# Patient Record
Sex: MALE | Race: OTHER
[De-identification: names, ages, dates, MRNs, and addresses within clinical notes are randomized per-mention and may not be internally consistent; named-entity substitution may affect disease eponyms.]

---

## 2018-02-13 LAB
ALBUMIN SERPL BCP-MCNC: 3.8 G/DL (ref 3.4–5)
ANION GAP SERPL CALCULATED.3IONS-SCNC: 12 MMOL/L (ref 8–16)
APTT PPP: 30 SECONDS (ref 22–32)
BASOPHILS # BLD AUTO: 0 X10'3 (ref 0–0.2)
BASOPHILS NFR BLD AUTO: 0.4 % (ref 0–1)
BUN SERPL-MCNC: 25 MG/DL (ref 7–18)
BUN/CREAT SERPL: 20.7 (ref 5.4–32)
CALCIUM SERPL-MCNC: 8.5 MG/DL (ref 8.5–10.1)
CHLORIDE SERPL-SCNC: 103 MMOL/L (ref 99–107)
CO2 SERPL-SCNC: 22.5 MMOL/L (ref 24–32)
CREAT SERPL-MCNC: 1.21 MG/DL (ref 0.6–1.1)
EOSINOPHIL # BLD AUTO: 0.3 X10'3 (ref 0–0.9)
EOSINOPHIL NFR BLD AUTO: 3.6 % (ref 0–6)
ERYTHROCYTE [DISTWIDTH] IN BLOOD BY AUTOMATED COUNT: 14.8 % (ref 11.5–14.5)
GFR SERPL CREATININE-BSD FRML MDRD: 59 ML/MIN
GLUCOSE SERPL-MCNC: 204 MG/DL (ref 70–104)
HCT VFR BLD AUTO: 39.3 % (ref 42–52)
HGB BLD-MCNC: 13.4 G/DL (ref 14–17.9)
INR PPP: 1 INR
LYMPHOCYTES # BLD AUTO: 2.3 X10'3 (ref 1.1–4.8)
LYMPHOCYTES NFR BLD AUTO: 30.9 % (ref 21–51)
MCH RBC QN AUTO: 32.3 PG (ref 27–31)
MCHC RBC AUTO-ENTMCNC: 34.2 % (ref 33–36.5)
MCV RBC AUTO: 94.6 FL (ref 78–98)
MONOCYTES # BLD AUTO: 0.5 X10'3 (ref 0–0.9)
MONOCYTES NFR BLD AUTO: 6.8 % (ref 2–12)
NEUTROPHILS # BLD AUTO: 4.4 X10'3 (ref 1.8–7.7)
NEUTROPHILS NFR BLD AUTO: 58.3 % (ref 42–75)
PLATELET # BLD AUTO: 220 X10'3 (ref 140–440)
PMV BLD AUTO: 7.6 FL (ref 7.4–10.4)
POTASSIUM SERPL-SCNC: 4.4 MMOL/L (ref 3.5–5.1)
PROTHROMBIN TIME: 10.5 SECONDS (ref 9–12)
RBC # BLD AUTO: 4.15 X10'6 (ref 4.7–6.1)
SODIUM SERPL-SCNC: 137 MMOL/L (ref 135–145)
WBC # BLD AUTO: 7.5 X10'3 (ref 4.5–11)

## 2018-02-14 ENCOUNTER — HOSPITAL ENCOUNTER (INPATIENT)
Dept: HOSPITAL 94 - SSTAY O | Age: 73
LOS: 2 days | Discharge: HOME | DRG: 247 | End: 2018-02-16
Attending: INTERNAL MEDICINE | Admitting: INTERNAL MEDICINE
Payer: MEDICARE

## 2018-02-14 VITALS — SYSTOLIC BLOOD PRESSURE: 200 MMHG | DIASTOLIC BLOOD PRESSURE: 84 MMHG

## 2018-02-14 VITALS — WEIGHT: 209.44 LBS | HEIGHT: 68 IN | BODY MASS INDEX: 31.74 KG/M2

## 2018-02-14 VITALS — DIASTOLIC BLOOD PRESSURE: 68 MMHG | SYSTOLIC BLOOD PRESSURE: 114 MMHG

## 2018-02-14 VITALS — SYSTOLIC BLOOD PRESSURE: 119 MMHG | DIASTOLIC BLOOD PRESSURE: 59 MMHG

## 2018-02-14 VITALS — SYSTOLIC BLOOD PRESSURE: 142 MMHG | DIASTOLIC BLOOD PRESSURE: 68 MMHG

## 2018-02-14 VITALS — DIASTOLIC BLOOD PRESSURE: 79 MMHG | SYSTOLIC BLOOD PRESSURE: 165 MMHG

## 2018-02-14 VITALS — SYSTOLIC BLOOD PRESSURE: 184 MMHG | DIASTOLIC BLOOD PRESSURE: 80 MMHG

## 2018-02-14 VITALS — DIASTOLIC BLOOD PRESSURE: 94 MMHG | SYSTOLIC BLOOD PRESSURE: 189 MMHG

## 2018-02-14 VITALS — SYSTOLIC BLOOD PRESSURE: 112 MMHG | DIASTOLIC BLOOD PRESSURE: 58 MMHG

## 2018-02-14 VITALS — SYSTOLIC BLOOD PRESSURE: 152 MMHG | DIASTOLIC BLOOD PRESSURE: 79 MMHG

## 2018-02-14 VITALS — SYSTOLIC BLOOD PRESSURE: 175 MMHG | DIASTOLIC BLOOD PRESSURE: 86 MMHG

## 2018-02-14 VITALS — DIASTOLIC BLOOD PRESSURE: 70 MMHG | SYSTOLIC BLOOD PRESSURE: 136 MMHG

## 2018-02-14 VITALS — DIASTOLIC BLOOD PRESSURE: 60 MMHG | SYSTOLIC BLOOD PRESSURE: 112 MMHG

## 2018-02-14 DIAGNOSIS — E11.22: ICD-10-CM

## 2018-02-14 DIAGNOSIS — I12.9: ICD-10-CM

## 2018-02-14 DIAGNOSIS — K21.9: ICD-10-CM

## 2018-02-14 DIAGNOSIS — Z89.429: ICD-10-CM

## 2018-02-14 DIAGNOSIS — R94.39: ICD-10-CM

## 2018-02-14 DIAGNOSIS — E78.5: ICD-10-CM

## 2018-02-14 DIAGNOSIS — Z79.899: ICD-10-CM

## 2018-02-14 DIAGNOSIS — Z60.2: ICD-10-CM

## 2018-02-14 DIAGNOSIS — I65.29: ICD-10-CM

## 2018-02-14 DIAGNOSIS — N18.9: ICD-10-CM

## 2018-02-14 DIAGNOSIS — I25.10: Primary | ICD-10-CM

## 2018-02-14 DIAGNOSIS — E55.9: ICD-10-CM

## 2018-02-14 DIAGNOSIS — I45.10: ICD-10-CM

## 2018-02-14 LAB
APTT PPP: 144 SECONDS (ref 22–32)
INR PPP: 1.1 INR
PROTHROMBIN TIME: 11.8 SECONDS (ref 9–12)

## 2018-02-14 PROCEDURE — 4A023N7 MEASUREMENT OF CARDIAC SAMPLING AND PRESSURE, LEFT HEART, PERCUTANEOUS APPROACH: ICD-10-PCS | Performed by: INTERNAL MEDICINE

## 2018-02-14 PROCEDURE — 93005 ELECTROCARDIOGRAM TRACING: CPT

## 2018-02-14 PROCEDURE — 85610 PROTHROMBIN TIME: CPT

## 2018-02-14 PROCEDURE — 85025 COMPLETE CBC W/AUTO DIFF WBC: CPT

## 2018-02-14 PROCEDURE — 85347 COAGULATION TIME ACTIVATED: CPT

## 2018-02-14 PROCEDURE — 99152 MOD SED SAME PHYS/QHP 5/>YRS: CPT

## 2018-02-14 PROCEDURE — 36415 COLL VENOUS BLD VENIPUNCTURE: CPT

## 2018-02-14 PROCEDURE — 80061 LIPID PANEL: CPT

## 2018-02-14 PROCEDURE — 80048 BASIC METABOLIC PNL TOTAL CA: CPT

## 2018-02-14 PROCEDURE — 027135Z DILATION OF CORONARY ARTERY, TWO ARTERIES WITH TWO DRUG-ELUTING INTRALUMINAL DEVICES, PERCUTANEOUS APPROACH: ICD-10-PCS | Performed by: INTERNAL MEDICINE

## 2018-02-14 PROCEDURE — 99153 MOD SED SAME PHYS/QHP EA: CPT

## 2018-02-14 PROCEDURE — B2111ZZ FLUOROSCOPY OF MULTIPLE CORONARY ARTERIES USING LOW OSMOLAR CONTRAST: ICD-10-PCS | Performed by: INTERNAL MEDICINE

## 2018-02-14 PROCEDURE — 93458 L HRT ARTERY/VENTRICLE ANGIO: CPT

## 2018-02-14 PROCEDURE — 85730 THROMBOPLASTIN TIME PARTIAL: CPT

## 2018-02-14 PROCEDURE — B2151ZZ FLUOROSCOPY OF LEFT HEART USING LOW OSMOLAR CONTRAST: ICD-10-PCS | Performed by: INTERNAL MEDICINE

## 2018-02-14 RX ADMIN — SODIUM CHLORIDE SCH MLS/HR: 9 INJECTION INTRAMUSCULAR; INTRAVENOUS; SUBCUTANEOUS at 14:24

## 2018-02-14 RX ADMIN — Medication SCH MLS/HR: at 23:13

## 2018-02-14 RX ADMIN — DOCUSATE SODIUM SCH MG: 100 CAPSULE, LIQUID FILLED ORAL at 20:29

## 2018-02-14 RX ADMIN — SODIUM CHLORIDE SCH MLS/HR: 9 INJECTION INTRAMUSCULAR; INTRAVENOUS; SUBCUTANEOUS at 11:06

## 2018-02-14 SDOH — SOCIAL STABILITY - SOCIAL INSECURITY: PROBLEMS RELATED TO LIVING ALONE: Z60.2

## 2018-02-15 VITALS — DIASTOLIC BLOOD PRESSURE: 80 MMHG | SYSTOLIC BLOOD PRESSURE: 161 MMHG

## 2018-02-15 VITALS — SYSTOLIC BLOOD PRESSURE: 155 MMHG | DIASTOLIC BLOOD PRESSURE: 85 MMHG

## 2018-02-15 VITALS — SYSTOLIC BLOOD PRESSURE: 165 MMHG | DIASTOLIC BLOOD PRESSURE: 84 MMHG

## 2018-02-15 VITALS — DIASTOLIC BLOOD PRESSURE: 58 MMHG | SYSTOLIC BLOOD PRESSURE: 153 MMHG

## 2018-02-15 VITALS — SYSTOLIC BLOOD PRESSURE: 165 MMHG | DIASTOLIC BLOOD PRESSURE: 88 MMHG

## 2018-02-15 VITALS — SYSTOLIC BLOOD PRESSURE: 157 MMHG | DIASTOLIC BLOOD PRESSURE: 54 MMHG

## 2018-02-15 VITALS — SYSTOLIC BLOOD PRESSURE: 163 MMHG | DIASTOLIC BLOOD PRESSURE: 87 MMHG

## 2018-02-15 VITALS — DIASTOLIC BLOOD PRESSURE: 58 MMHG | SYSTOLIC BLOOD PRESSURE: 102 MMHG

## 2018-02-15 VITALS — SYSTOLIC BLOOD PRESSURE: 143 MMHG | DIASTOLIC BLOOD PRESSURE: 95 MMHG

## 2018-02-15 VITALS — DIASTOLIC BLOOD PRESSURE: 59 MMHG | SYSTOLIC BLOOD PRESSURE: 92 MMHG

## 2018-02-15 VITALS — SYSTOLIC BLOOD PRESSURE: 145 MMHG | DIASTOLIC BLOOD PRESSURE: 75 MMHG

## 2018-02-15 VITALS — DIASTOLIC BLOOD PRESSURE: 85 MMHG | SYSTOLIC BLOOD PRESSURE: 178 MMHG

## 2018-02-15 VITALS — SYSTOLIC BLOOD PRESSURE: 176 MMHG | DIASTOLIC BLOOD PRESSURE: 89 MMHG

## 2018-02-15 VITALS — DIASTOLIC BLOOD PRESSURE: 59 MMHG | SYSTOLIC BLOOD PRESSURE: 119 MMHG

## 2018-02-15 VITALS — SYSTOLIC BLOOD PRESSURE: 160 MMHG | DIASTOLIC BLOOD PRESSURE: 78 MMHG

## 2018-02-15 VITALS — DIASTOLIC BLOOD PRESSURE: 82 MMHG | SYSTOLIC BLOOD PRESSURE: 154 MMHG

## 2018-02-15 VITALS — SYSTOLIC BLOOD PRESSURE: 168 MMHG | DIASTOLIC BLOOD PRESSURE: 79 MMHG

## 2018-02-15 VITALS — SYSTOLIC BLOOD PRESSURE: 157 MMHG | DIASTOLIC BLOOD PRESSURE: 74 MMHG

## 2018-02-15 VITALS — DIASTOLIC BLOOD PRESSURE: 92 MMHG | SYSTOLIC BLOOD PRESSURE: 167 MMHG

## 2018-02-15 VITALS — SYSTOLIC BLOOD PRESSURE: 165 MMHG | DIASTOLIC BLOOD PRESSURE: 79 MMHG

## 2018-02-15 VITALS — SYSTOLIC BLOOD PRESSURE: 176 MMHG | DIASTOLIC BLOOD PRESSURE: 88 MMHG

## 2018-02-15 VITALS — SYSTOLIC BLOOD PRESSURE: 121 MMHG | DIASTOLIC BLOOD PRESSURE: 61 MMHG

## 2018-02-15 LAB
ALBUMIN SERPL BCP-MCNC: 3 G/DL (ref 3.4–5)
ANION GAP SERPL CALCULATED.3IONS-SCNC: 11 MMOL/L (ref 8–16)
BASOPHILS # BLD AUTO: 0 X10'3 (ref 0–0.2)
BASOPHILS NFR BLD AUTO: 0.5 % (ref 0–1)
BUN SERPL-MCNC: 19 MG/DL (ref 7–18)
BUN/CREAT SERPL: 15.8 (ref 5.4–32)
CALCIUM SERPL-MCNC: 8 MG/DL (ref 8.5–10.1)
CHLORIDE SERPL-SCNC: 100 MMOL/L (ref 99–107)
CHOLEST SERPL-MCNC: 101 MG/DL (ref 0–200)
CHOLEST/HDLC SERPL: 1.8 {RATIO} (ref 0–4.99)
CO2 SERPL-SCNC: 27.6 MMOL/L (ref 24–32)
CREAT SERPL-MCNC: 1.2 MG/DL (ref 0.6–1.1)
EOSINOPHIL # BLD AUTO: 0.3 X10'3 (ref 0–0.9)
EOSINOPHIL NFR BLD AUTO: 4.1 % (ref 0–6)
ERYTHROCYTE [DISTWIDTH] IN BLOOD BY AUTOMATED COUNT: 14.6 % (ref 11.5–14.5)
GFR SERPL CREATININE-BSD FRML MDRD: 60 ML/MIN
GLUCOSE SERPL-MCNC: 250 MG/DL (ref 70–104)
HCT VFR BLD AUTO: 33.3 % (ref 42–52)
HDLC SERPL-MCNC: 55 MG/DL (ref 35–60)
HGB BLD-MCNC: 11.4 G/DL (ref 14–17.9)
LDLC SERPL DIRECT ASSAY-MCNC: 48 MG/DL (ref 50–100)
LYMPHOCYTES # BLD AUTO: 1.8 X10'3 (ref 1.1–4.8)
LYMPHOCYTES NFR BLD AUTO: 27.8 % (ref 21–51)
MCH RBC QN AUTO: 32.2 PG (ref 27–31)
MCHC RBC AUTO-ENTMCNC: 34.1 % (ref 33–36.5)
MCV RBC AUTO: 94.5 FL (ref 78–98)
MONOCYTES # BLD AUTO: 0.7 X10'3 (ref 0–0.9)
MONOCYTES NFR BLD AUTO: 11.1 % (ref 2–12)
NEUTROPHILS # BLD AUTO: 3.6 X10'3 (ref 1.8–7.7)
NEUTROPHILS NFR BLD AUTO: 56.5 % (ref 42–75)
PLATELET # BLD AUTO: 197 X10'3 (ref 140–440)
PMV BLD AUTO: 7.6 FL (ref 7.4–10.4)
POTASSIUM SERPL-SCNC: 3.6 MMOL/L (ref 3.5–5.1)
RBC # BLD AUTO: 3.52 X10'6 (ref 4.7–6.1)
SODIUM SERPL-SCNC: 139 MMOL/L (ref 135–145)
TRIGL SERPL-MCNC: 79 MG/DL (ref 20–135)
WBC # BLD AUTO: 6.4 X10'3 (ref 4.5–11)

## 2018-02-15 PROCEDURE — 027034Z DILATION OF CORONARY ARTERY, ONE ARTERY WITH DRUG-ELUTING INTRALUMINAL DEVICE, PERCUTANEOUS APPROACH: ICD-10-PCS | Performed by: INTERNAL MEDICINE

## 2018-02-15 RX ADMIN — DOCUSATE SODIUM SCH MG: 100 CAPSULE, LIQUID FILLED ORAL at 10:11

## 2018-02-15 RX ADMIN — DOCUSATE SODIUM SCH MG: 100 CAPSULE, LIQUID FILLED ORAL at 20:22

## 2018-02-15 RX ADMIN — Medication SCH MLS/HR: at 10:40

## 2018-02-16 VITALS — SYSTOLIC BLOOD PRESSURE: 122 MMHG | DIASTOLIC BLOOD PRESSURE: 52 MMHG

## 2018-02-16 VITALS — DIASTOLIC BLOOD PRESSURE: 70 MMHG | SYSTOLIC BLOOD PRESSURE: 123 MMHG

## 2018-02-16 VITALS — DIASTOLIC BLOOD PRESSURE: 64 MMHG | SYSTOLIC BLOOD PRESSURE: 135 MMHG

## 2018-02-16 VITALS — DIASTOLIC BLOOD PRESSURE: 73 MMHG | SYSTOLIC BLOOD PRESSURE: 141 MMHG

## 2018-02-16 VITALS — SYSTOLIC BLOOD PRESSURE: 140 MMHG | DIASTOLIC BLOOD PRESSURE: 79 MMHG

## 2018-02-16 VITALS — SYSTOLIC BLOOD PRESSURE: 100 MMHG | DIASTOLIC BLOOD PRESSURE: 59 MMHG

## 2018-02-16 VITALS — SYSTOLIC BLOOD PRESSURE: 119 MMHG | DIASTOLIC BLOOD PRESSURE: 76 MMHG

## 2018-02-16 VITALS — DIASTOLIC BLOOD PRESSURE: 78 MMHG | SYSTOLIC BLOOD PRESSURE: 158 MMHG

## 2018-02-16 VITALS — SYSTOLIC BLOOD PRESSURE: 139 MMHG | DIASTOLIC BLOOD PRESSURE: 74 MMHG

## 2018-02-16 VITALS — SYSTOLIC BLOOD PRESSURE: 156 MMHG | DIASTOLIC BLOOD PRESSURE: 76 MMHG

## 2018-02-16 VITALS — DIASTOLIC BLOOD PRESSURE: 80 MMHG | SYSTOLIC BLOOD PRESSURE: 155 MMHG

## 2018-02-16 VITALS — SYSTOLIC BLOOD PRESSURE: 75 MMHG | DIASTOLIC BLOOD PRESSURE: 43 MMHG

## 2018-02-16 RX ADMIN — DOCUSATE SODIUM SCH MG: 100 CAPSULE, LIQUID FILLED ORAL at 08:00

## 2019-12-02 ENCOUNTER — HOSPITAL ENCOUNTER (INPATIENT)
Dept: HOSPITAL 94 - ER | Age: 74
LOS: 16 days | Discharge: NURSING, REHAB OR LONG-TERM CARE | DRG: 270 | End: 2019-12-18
Attending: INTERNAL MEDICINE | Admitting: HOSPITALIST
Payer: MEDICARE

## 2019-12-02 VITALS — SYSTOLIC BLOOD PRESSURE: 90 MMHG | DIASTOLIC BLOOD PRESSURE: 46 MMHG

## 2019-12-02 VITALS — DIASTOLIC BLOOD PRESSURE: 76 MMHG | SYSTOLIC BLOOD PRESSURE: 97 MMHG

## 2019-12-02 VITALS — WEIGHT: 239.64 LBS | BODY MASS INDEX: 36.32 KG/M2 | HEIGHT: 68 IN

## 2019-12-02 VITALS — DIASTOLIC BLOOD PRESSURE: 86 MMHG | SYSTOLIC BLOOD PRESSURE: 102 MMHG

## 2019-12-02 VITALS — SYSTOLIC BLOOD PRESSURE: 94 MMHG | DIASTOLIC BLOOD PRESSURE: 43 MMHG

## 2019-12-02 VITALS — DIASTOLIC BLOOD PRESSURE: 46 MMHG | SYSTOLIC BLOOD PRESSURE: 92 MMHG

## 2019-12-02 DIAGNOSIS — I25.10: ICD-10-CM

## 2019-12-02 DIAGNOSIS — Z79.82: ICD-10-CM

## 2019-12-02 DIAGNOSIS — Z99.2: ICD-10-CM

## 2019-12-02 DIAGNOSIS — Z79.899: ICD-10-CM

## 2019-12-02 DIAGNOSIS — Z79.84: ICD-10-CM

## 2019-12-02 DIAGNOSIS — I21.3: Primary | ICD-10-CM

## 2019-12-02 DIAGNOSIS — Z88.5: ICD-10-CM

## 2019-12-02 DIAGNOSIS — I42.0: ICD-10-CM
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
## 2019-12-02 DIAGNOSIS — I46.9: ICD-10-CM

## 2019-12-02 DIAGNOSIS — I25.5: ICD-10-CM

## 2019-12-02 DIAGNOSIS — Z63.8: ICD-10-CM

## 2019-12-02 DIAGNOSIS — I13.0: ICD-10-CM

## 2019-12-02 DIAGNOSIS — E11.22: ICD-10-CM

## 2019-12-02 DIAGNOSIS — E78.5: ICD-10-CM
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
## 2019-12-02 DIAGNOSIS — R94.5: ICD-10-CM

## 2019-12-02 DIAGNOSIS — E66.9: ICD-10-CM
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
## 2019-12-02 DIAGNOSIS — K21.9: ICD-10-CM

## 2019-12-02 DIAGNOSIS — N17.0: ICD-10-CM

## 2019-12-02 DIAGNOSIS — I50.9: ICD-10-CM

## 2019-12-02 DIAGNOSIS — Z66: ICD-10-CM

## 2019-12-02 DIAGNOSIS — I97.710: ICD-10-CM

## 2019-12-02 DIAGNOSIS — Z79.02: ICD-10-CM

## 2019-12-02 DIAGNOSIS — Z95.5: ICD-10-CM

## 2019-12-02 DIAGNOSIS — M19.90: ICD-10-CM

## 2019-12-02 DIAGNOSIS — J96.00: ICD-10-CM

## 2019-12-02 DIAGNOSIS — Z82.3: ICD-10-CM

## 2019-12-02 DIAGNOSIS — D64.9: ICD-10-CM

## 2019-12-02 DIAGNOSIS — J98.11: ICD-10-CM

## 2019-12-02 DIAGNOSIS — N18.3: ICD-10-CM

## 2019-12-02 LAB
ALBUMIN SERPL BCP-MCNC: 2.7 G/DL (ref 3.4–5)
ALBUMIN SERPL BCP-MCNC: 3.2 G/DL (ref 3.4–5)
ALBUMIN/GLOB SERPL: 0.7 {RATIO} (ref 1.1–1.5)
ALBUMIN/GLOB SERPL: 0.8 {RATIO} (ref 1.1–1.5)
ALP SERPL-CCNC: 112 IU/L (ref 46–116)
ALP SERPL-CCNC: 118 IU/L (ref 46–116)
ALT SERPL W P-5'-P-CCNC: 22 U/L (ref 12–78)
ALT SERPL W P-5'-P-CCNC: 32 U/L (ref 12–78)
ANION GAP SERPL CALCULATED.3IONS-SCNC: 10 MMOL/L (ref 8–16)
ANION GAP SERPL CALCULATED.3IONS-SCNC: 13 MMOL/L (ref 8–16)
APTT PPP: (no result) SECONDS (ref 22–32)
APTT PPP: 65 SECONDS (ref 22–32)
AST SERPL W P-5'-P-CCNC: 35 U/L (ref 10–37)
AST SERPL W P-5'-P-CCNC: 53 U/L (ref 10–37)
BASE EXCESS BLDA CALC-SCNC: -10.3 MMOL/L (ref -2–3)
BASOPHILS # BLD AUTO: 0.1 X10'3 (ref 0–0.2)
BASOPHILS # BLD AUTO: 0.1 X10'3 (ref 0–0.2)
BASOPHILS NFR BLD AUTO: 0.5 % (ref 0–1)
BASOPHILS NFR BLD AUTO: 0.6 % (ref 0–1)
BILIRUB SERPL-MCNC: 0.9 MG/DL (ref 0.1–1)
BILIRUB SERPL-MCNC: 1.2 MG/DL (ref 0.1–1)
BODY TEMPERATURE: 36.6
BUN SERPL-MCNC: 31 MG/DL (ref 7–18)
BUN SERPL-MCNC: 33 MG/DL (ref 7–18)
BUN/CREAT SERPL: 17.6 (ref 5.4–32)
BUN/CREAT SERPL: 18.3 (ref 5.4–32)
CALCIUM SERPL-MCNC: 7.6 MG/DL (ref 8.5–10.1)
CALCIUM SERPL-MCNC: 8.3 MG/DL (ref 8.5–10.1)
CHLORIDE SERPL-SCNC: 98 MMOL/L (ref 99–107)
CHLORIDE SERPL-SCNC: 99 MMOL/L (ref 99–107)
CO2 SERPL-SCNC: 20.3 MMOL/L (ref 24–32)
CO2 SERPL-SCNC: 22.9 MMOL/L (ref 24–32)
COHGB MFR BLDA: 0.3 % (ref 0.5–1.5)
CREAT SERPL-MCNC: 1.69 MG/DL (ref 0.6–1.1)
CREAT SERPL-MCNC: 1.88 MG/DL (ref 0.6–1.1)
EOSINOPHIL # BLD AUTO: 0.1 X10'3 (ref 0–0.9)
EOSINOPHIL # BLD AUTO: 0.2 X10'3 (ref 0–0.9)
EOSINOPHIL NFR BLD AUTO: 0.3 % (ref 0–6)
EOSINOPHIL NFR BLD AUTO: 2.6 % (ref 0–6)
ERYTHROCYTE [DISTWIDTH] IN BLOOD BY AUTOMATED COUNT: 13.5 % (ref 11.5–14.5)
ERYTHROCYTE [DISTWIDTH] IN BLOOD BY AUTOMATED COUNT: 13.6 % (ref 11.5–14.5)
GFR SERPL CREATININE-BSD FRML MDRD: 35 ML/MIN
GFR SERPL CREATININE-BSD FRML MDRD: 40 ML/MIN
GLUCOSE SERPL-MCNC: 149 MG/DL (ref 70–104)
GLUCOSE SERPL-MCNC: 317 MG/DL (ref 70–104)
HBA1C MFR BLD: 11.6 % (ref 4.5–6.2)
HCO3 BLDA-SCNC: 16.8 MMOL/L (ref 22–26)
HCT VFR BLD AUTO: 33.1 % (ref 42–52)
HCT VFR BLD AUTO: 34.4 % (ref 42–52)
HGB BLD-MCNC: 11.2 G/DL (ref 14–17.9)
HGB BLD-MCNC: 11.6 G/DL (ref 14–17.9)
HGB BLDA-MCNC: 12.8 G/DL (ref 14–17.9)
LYMPHOCYTES # BLD AUTO: 1.2 X10'3 (ref 1.1–4.8)
LYMPHOCYTES # BLD AUTO: 1.6 X10'3 (ref 1.1–4.8)
LYMPHOCYTES NFR BLD AUTO: 17.4 % (ref 21–51)
LYMPHOCYTES NFR BLD AUTO: 8 % (ref 21–51)
MAGNESIUM SERPL-MCNC: 1.6 MG/DL (ref 1.5–2.4)
MAGNESIUM SERPL-MCNC: 1.7 MG/DL (ref 1.5–2.4)
MCH RBC QN AUTO: 31.3 PG (ref 27–31)
MCH RBC QN AUTO: 31.3 PG (ref 27–31)
MCHC RBC AUTO-ENTMCNC: 33.8 G/DL (ref 33–36.5)
MCHC RBC AUTO-ENTMCNC: 33.9 G/DL (ref 33–36.5)
MCV RBC AUTO: 92.2 FL (ref 78–98)
MCV RBC AUTO: 92.4 FL (ref 78–98)
METHGB MFR BLDA: 0.3 % (ref 0.3–1.12)
MONOCYTES # BLD AUTO: 1.1 X10'3 (ref 0–0.9)
MONOCYTES # BLD AUTO: 1.4 X10'3 (ref 0–0.9)
MONOCYTES NFR BLD AUTO: 12.2 % (ref 2–12)
MONOCYTES NFR BLD AUTO: 9.2 % (ref 2–12)
NEUTROPHILS # BLD AUTO: 12.1 X10'3 (ref 1.8–7.7)
NEUTROPHILS # BLD AUTO: 6.3 X10'3 (ref 1.8–7.7)
NEUTROPHILS NFR BLD AUTO: 67.3 % (ref 42–75)
NEUTROPHILS NFR BLD AUTO: 81.9 % (ref 42–75)
O2/TOTAL GAS SETTING VFR VENT: 100 MMHG/%
OXYHGB MFR BLDA: 94 % (ref 94–100)
PCO2 TEMP ADJ BLDA: 41 MMHG (ref 35–45)
PEEP RESPIRATORY: 5 CM H2O
PH TEMP ADJ BLDA: 7.23 [PH] (ref 7.35–7.45)
PHOSPHATE SERPL-MCNC: 4.4 MG/DL (ref 2.3–4.5)
PLATELET # BLD AUTO: 249 X10'3 (ref 140–440)
PLATELET # BLD AUTO: 252 X10'3 (ref 140–440)
PMV BLD AUTO: 7.5 FL (ref 7.4–10.4)
PMV BLD AUTO: 7.8 FL (ref 7.4–10.4)
PO2 TEMP ADJ BLD: 82.1 MMHG (ref 83–108)
POTASSIUM SERPL-SCNC: 3.6 MMOL/L (ref 3.5–5.1)
POTASSIUM SERPL-SCNC: 4.2 MMOL/L (ref 3.5–5.1)
PROT SERPL-MCNC: 6.6 G/DL (ref 6.4–8.2)
PROT SERPL-MCNC: 7.4 G/DL (ref 6.4–8.2)
RBC # BLD AUTO: 3.59 X10'6 (ref 4.7–6.1)
RBC # BLD AUTO: 3.72 X10'6 (ref 4.7–6.1)
RESP RATE 1H: 16 B/MIN
RESP RATE RESTING: 16 B/MIN
SAO2 % BLDA: 94.6 % (ref 95–98)
SODIUM SERPL-SCNC: 131 MMOL/L (ref 135–145)
SODIUM SERPL-SCNC: 132 MMOL/L (ref 135–145)
SPONT VT COMPRES GAS VOL SET UNCOR VENT: 450 ML
TROPONIN I SERPL-MCNC: 3.17 NG/ML (ref 0–0.05)
VOL.EXP/M/BW ON VENT: 8 L/MIN
WBC # BLD AUTO: 14.7 X10'3 (ref 4.5–11)
WBC # BLD AUTO: 9.4 X10'3 (ref 4.5–11)

## 2019-12-02 PROCEDURE — 93306 TTE W/DOPPLER COMPLETE: CPT

## 2019-12-02 PROCEDURE — 86900 BLOOD TYPING SEROLOGIC ABO: CPT

## 2019-12-02 PROCEDURE — 84478 ASSAY OF TRIGLYCERIDES: CPT

## 2019-12-02 PROCEDURE — 90732 PPSV23 VACC 2 YRS+ SUBQ/IM: CPT

## 2019-12-02 PROCEDURE — 36558 INSERT TUNNELED CV CATH: CPT

## 2019-12-02 PROCEDURE — 0BH17EZ INSERTION OF ENDOTRACHEAL AIRWAY INTO TRACHEA, VIA NATURAL OR ARTIFICIAL OPENING: ICD-10-PCS | Performed by: EMERGENCY MEDICINE

## 2019-12-02 PROCEDURE — 80048 BASIC METABOLIC PNL TOTAL CA: CPT

## 2019-12-02 PROCEDURE — 94002 VENT MGMT INPAT INIT DAY: CPT

## 2019-12-02 PROCEDURE — 87088 URINE BACTERIA CULTURE: CPT

## 2019-12-02 PROCEDURE — 94003 VENT MGMT INPAT SUBQ DAY: CPT

## 2019-12-02 PROCEDURE — 80053 COMPREHEN METABOLIC PANEL: CPT

## 2019-12-02 PROCEDURE — 93005 ELECTROCARDIOGRAM TRACING: CPT

## 2019-12-02 PROCEDURE — 85730 THROMBOPLASTIN TIME PARTIAL: CPT

## 2019-12-02 PROCEDURE — 96365 THER/PROPH/DIAG IV INF INIT: CPT

## 2019-12-02 PROCEDURE — 5A12012 PERFORMANCE OF CARDIAC OUTPUT, SINGLE, MANUAL: ICD-10-PCS | Performed by: INTERNAL MEDICINE

## 2019-12-02 PROCEDURE — 82553 CREATINE MB FRACTION: CPT

## 2019-12-02 PROCEDURE — 86920 COMPATIBILITY TEST SPIN: CPT

## 2019-12-02 PROCEDURE — 82140 ASSAY OF AMMONIA: CPT

## 2019-12-02 PROCEDURE — 36415 COLL VENOUS BLD VENIPUNCTURE: CPT

## 2019-12-02 PROCEDURE — 82330 ASSAY OF CALCIUM: CPT

## 2019-12-02 PROCEDURE — 97530 THERAPEUTIC ACTIVITIES: CPT

## 2019-12-02 PROCEDURE — 93308 TTE F-UP OR LMTD: CPT

## 2019-12-02 PROCEDURE — 77001 FLUOROGUIDE FOR VEIN DEVICE: CPT

## 2019-12-02 PROCEDURE — 87070 CULTURE OTHR SPECIMN AEROBIC: CPT

## 2019-12-02 PROCEDURE — 5A1955Z RESPIRATORY VENTILATION, GREATER THAN 96 CONSECUTIVE HOURS: ICD-10-PCS | Performed by: EMERGENCY MEDICINE

## 2019-12-02 PROCEDURE — 85025 COMPLETE CBC W/AUTO DIFF WBC: CPT

## 2019-12-02 PROCEDURE — 76700 US EXAM ABDOM COMPLETE: CPT

## 2019-12-02 PROCEDURE — 83735 ASSAY OF MAGNESIUM: CPT

## 2019-12-02 PROCEDURE — 027034Z DILATION OF CORONARY ARTERY, ONE ARTERY WITH DRUG-ELUTING INTRALUMINAL DEVICE, PERCUTANEOUS APPROACH: ICD-10-PCS | Performed by: INTERNAL MEDICINE

## 2019-12-02 PROCEDURE — 85610 PROTHROMBIN TIME: CPT

## 2019-12-02 PROCEDURE — 99153 MOD SED SAME PHYS/QHP EA: CPT

## 2019-12-02 PROCEDURE — 4A023N8 MEASUREMENT OF CARDIAC SAMPLING AND PRESSURE, BILATERAL, PERCUTANEOUS APPROACH: ICD-10-PCS | Performed by: INTERNAL MEDICINE

## 2019-12-02 PROCEDURE — 84484 ASSAY OF TROPONIN QUANT: CPT

## 2019-12-02 PROCEDURE — 84134 ASSAY OF PREALBUMIN: CPT

## 2019-12-02 PROCEDURE — 33967 INSERT I-AORT PERCUT DEVICE: CPT

## 2019-12-02 PROCEDURE — 84100 ASSAY OF PHOSPHORUS: CPT

## 2019-12-02 PROCEDURE — 86901 BLOOD TYPING SEROLOGIC RH(D): CPT

## 2019-12-02 PROCEDURE — 83036 HEMOGLOBIN GLYCOSYLATED A1C: CPT

## 2019-12-02 PROCEDURE — 86885 COOMBS TEST INDIRECT QUAL: CPT

## 2019-12-02 PROCEDURE — 97161 PT EVAL LOW COMPLEX 20 MIN: CPT

## 2019-12-02 PROCEDURE — 70450 CT HEAD/BRAIN W/O DYE: CPT

## 2019-12-02 PROCEDURE — 76937 US GUIDE VASCULAR ACCESS: CPT

## 2019-12-02 PROCEDURE — 78227 HEPATOBIL SYST IMAGE W/DRUG: CPT

## 2019-12-02 PROCEDURE — 90935 HEMODIALYSIS ONE EVALUATION: CPT

## 2019-12-02 PROCEDURE — B2111ZZ FLUOROSCOPY OF MULTIPLE CORONARY ARTERIES USING LOW OSMOLAR CONTRAST: ICD-10-PCS | Performed by: INTERNAL MEDICINE

## 2019-12-02 PROCEDURE — 5A02210 ASSISTANCE WITH CARDIAC OUTPUT USING BALLOON PUMP, CONTINUOUS: ICD-10-PCS | Performed by: INTERNAL MEDICINE

## 2019-12-02 PROCEDURE — 36569 INSJ PICC 5 YR+ W/O IMAGING: CPT

## 2019-12-02 PROCEDURE — 81001 URINALYSIS AUTO W/SCOPE: CPT

## 2019-12-02 PROCEDURE — 82810 BLOOD GASES O2 SAT ONLY: CPT

## 2019-12-02 PROCEDURE — 87077 CULTURE AEROBIC IDENTIFY: CPT

## 2019-12-02 PROCEDURE — 82803 BLOOD GASES ANY COMBINATION: CPT

## 2019-12-02 PROCEDURE — B2151ZZ FLUOROSCOPY OF LEFT HEART USING LOW OSMOLAR CONTRAST: ICD-10-PCS | Performed by: INTERNAL MEDICINE

## 2019-12-02 PROCEDURE — 80202 ASSAY OF VANCOMYCIN: CPT

## 2019-12-02 PROCEDURE — 83880 ASSAY OF NATRIURETIC PEPTIDE: CPT

## 2019-12-02 PROCEDURE — 92950 HEART/LUNG RESUSCITATION CPR: CPT

## 2019-12-02 PROCEDURE — 85018 HEMOGLOBIN: CPT

## 2019-12-02 PROCEDURE — 87340 HEPATITIS B SURFACE AG IA: CPT

## 2019-12-02 PROCEDURE — 82948 REAGENT STRIP/BLOOD GLUCOSE: CPT

## 2019-12-02 PROCEDURE — 83605 ASSAY OF LACTIC ACID: CPT

## 2019-12-02 PROCEDURE — 97110 THERAPEUTIC EXERCISES: CPT

## 2019-12-02 PROCEDURE — 80069 RENAL FUNCTION PANEL: CPT

## 2019-12-02 PROCEDURE — 99152 MOD SED SAME PHYS/QHP 5/>YRS: CPT

## 2019-12-02 PROCEDURE — 94760 N-INVAS EAR/PLS OXIMETRY 1: CPT

## 2019-12-02 PROCEDURE — 87081 CULTURE SCREEN ONLY: CPT

## 2019-12-02 PROCEDURE — 99285 EMERGENCY DEPT VISIT HI MDM: CPT

## 2019-12-02 PROCEDURE — 84145 PROCALCITONIN (PCT): CPT

## 2019-12-02 PROCEDURE — 85347 COAGULATION TIME ACTIVATED: CPT

## 2019-12-02 PROCEDURE — 87040 BLOOD CULTURE FOR BACTERIA: CPT

## 2019-12-02 PROCEDURE — 80061 LIPID PANEL: CPT

## 2019-12-02 PROCEDURE — 71045 X-RAY EXAM CHEST 1 VIEW: CPT

## 2019-12-02 PROCEDURE — 93460 R&L HRT ART/VENTRICLE ANGIO: CPT

## 2019-12-02 PROCEDURE — 92508 TX SP LANG VOICE COMM GROUP: CPT

## 2019-12-02 PROCEDURE — 82550 ASSAY OF CK (CPK): CPT

## 2019-12-02 PROCEDURE — 36600 WITHDRAWAL OF ARTERIAL BLOOD: CPT

## 2019-12-02 PROCEDURE — 94668 MNPJ CHEST WALL SBSQ: CPT

## 2019-12-02 PROCEDURE — 92616 FEES W/LARYNGEAL SENSE TEST: CPT

## 2019-12-02 PROCEDURE — 94640 AIRWAY INHALATION TREATMENT: CPT

## 2019-12-02 RX ADMIN — HEPARIN SODIUM AND DEXTROSE SCH MLS/HR: 10000; 5 INJECTION INTRAVENOUS at 16:22

## 2019-12-02 RX ADMIN — Medication PRN MLS/HR: at 22:49

## 2019-12-02 RX ADMIN — INSULIN GLARGINE SCH UNIT: 100 INJECTION, SOLUTION SUBCUTANEOUS at 21:00

## 2019-12-02 RX ADMIN — DOBUTAMINE IN DEXTROSE SCH MLS/HR: 200 INJECTION, SOLUTION INTRAVENOUS at 21:31

## 2019-12-02 RX ADMIN — DOPAMINE HYDROCHLORIDE IN DEXTROSE SCH MLS/HR: 1.6 INJECTION, SOLUTION INTRAVENOUS at 21:31

## 2019-12-02 RX ADMIN — IPRATROPIUM BROMIDE AND ALBUTEROL SULFATE SCH ML: .5; 3 SOLUTION RESPIRATORY (INHALATION) at 23:57

## 2019-12-02 RX ADMIN — Medication PRN MLS/HR: at 22:48

## 2019-12-02 SDOH — SOCIAL STABILITY - SOCIAL INSECURITY: OTHER SPECIFIED PROBLEMS RELATED TO PRIMARY SUPPORT GROUP: Z63.8

## 2019-12-02 NOTE — NUR
late entry; pt was in ED and Dr. Jones was asking about a next of kin or someone to 
notify if something were to happen to him. pt state "I really do not want her told because 
she get very upset and i don't want that." he refuse to even give a name to the doctor.

## 2019-12-02 NOTE — NUR
2000-0000

I have received report and pt from Cath-Lab, Pt is on an IABP 1:2, IABP is to the rt groin 
without hematoma, pt placed on monitor, pts rit pupil is blown and nonreactive, left is 
pinpoint. pt does not respond to verbal stimuli or tactical stimuli, dopamine and dobutamine 
drip in place for cardiac support via CVL,  Pineda catheter placed per MD orders as well as 
an OG, pt tolerated both procedures well, no difficulties noted. Spoke to NAN Lynn NP 
regarding pupil response and size, new orders to take pt for a CT of his head, pt placed on 
transport monitor, taken to CT with the assistance or 2 respitory therapist, the charge 
nurse, radiology tech and myself. Ambu bag used during transport. Pt returned to room 
without difficulties, pt placed back on monitor, hypothermic protocol in place.  Pt has 
intermittent stiffening of the legs and arm twitching, versed drip started, fentanyl drip in 
place for pain control, heparin drip in place per md orders  Troponin 3.17 NAN Lynn 
notified  no new orders at this time

## 2019-12-03 VITALS — SYSTOLIC BLOOD PRESSURE: 81 MMHG | DIASTOLIC BLOOD PRESSURE: 50 MMHG

## 2019-12-03 VITALS — DIASTOLIC BLOOD PRESSURE: 49 MMHG | SYSTOLIC BLOOD PRESSURE: 93 MMHG

## 2019-12-03 VITALS — DIASTOLIC BLOOD PRESSURE: 49 MMHG | SYSTOLIC BLOOD PRESSURE: 90 MMHG

## 2019-12-03 VITALS — SYSTOLIC BLOOD PRESSURE: 86 MMHG | DIASTOLIC BLOOD PRESSURE: 47 MMHG

## 2019-12-03 VITALS — DIASTOLIC BLOOD PRESSURE: 41 MMHG | SYSTOLIC BLOOD PRESSURE: 83 MMHG

## 2019-12-03 VITALS — DIASTOLIC BLOOD PRESSURE: 45 MMHG | SYSTOLIC BLOOD PRESSURE: 91 MMHG

## 2019-12-03 VITALS — DIASTOLIC BLOOD PRESSURE: 52 MMHG | SYSTOLIC BLOOD PRESSURE: 87 MMHG

## 2019-12-03 VITALS — DIASTOLIC BLOOD PRESSURE: 50 MMHG | SYSTOLIC BLOOD PRESSURE: 97 MMHG

## 2019-12-03 VITALS — SYSTOLIC BLOOD PRESSURE: 79 MMHG | DIASTOLIC BLOOD PRESSURE: 41 MMHG

## 2019-12-03 VITALS — DIASTOLIC BLOOD PRESSURE: 46 MMHG | SYSTOLIC BLOOD PRESSURE: 87 MMHG

## 2019-12-03 VITALS — SYSTOLIC BLOOD PRESSURE: 84 MMHG | DIASTOLIC BLOOD PRESSURE: 40 MMHG

## 2019-12-03 VITALS — SYSTOLIC BLOOD PRESSURE: 86 MMHG | DIASTOLIC BLOOD PRESSURE: 49 MMHG

## 2019-12-03 VITALS — SYSTOLIC BLOOD PRESSURE: 90 MMHG | DIASTOLIC BLOOD PRESSURE: 45 MMHG

## 2019-12-03 VITALS — DIASTOLIC BLOOD PRESSURE: 45 MMHG | SYSTOLIC BLOOD PRESSURE: 86 MMHG

## 2019-12-03 VITALS — DIASTOLIC BLOOD PRESSURE: 43 MMHG | SYSTOLIC BLOOD PRESSURE: 82 MMHG

## 2019-12-03 VITALS — DIASTOLIC BLOOD PRESSURE: 63 MMHG | SYSTOLIC BLOOD PRESSURE: 87 MMHG

## 2019-12-03 VITALS — SYSTOLIC BLOOD PRESSURE: 84 MMHG | DIASTOLIC BLOOD PRESSURE: 46 MMHG

## 2019-12-03 VITALS — DIASTOLIC BLOOD PRESSURE: 49 MMHG | SYSTOLIC BLOOD PRESSURE: 98 MMHG

## 2019-12-03 VITALS — SYSTOLIC BLOOD PRESSURE: 84 MMHG | DIASTOLIC BLOOD PRESSURE: 42 MMHG

## 2019-12-03 VITALS — SYSTOLIC BLOOD PRESSURE: 90 MMHG | DIASTOLIC BLOOD PRESSURE: 46 MMHG

## 2019-12-03 VITALS — DIASTOLIC BLOOD PRESSURE: 42 MMHG | SYSTOLIC BLOOD PRESSURE: 89 MMHG

## 2019-12-03 VITALS — DIASTOLIC BLOOD PRESSURE: 40 MMHG | SYSTOLIC BLOOD PRESSURE: 80 MMHG

## 2019-12-03 VITALS — DIASTOLIC BLOOD PRESSURE: 68 MMHG | SYSTOLIC BLOOD PRESSURE: 94 MMHG

## 2019-12-03 VITALS — SYSTOLIC BLOOD PRESSURE: 91 MMHG | DIASTOLIC BLOOD PRESSURE: 46 MMHG

## 2019-12-03 LAB
ALBUMIN SERPL BCP-MCNC: 2.3 G/DL (ref 3.4–5)
ALBUMIN SERPL BCP-MCNC: 2.3 G/DL (ref 3.4–5)
ALBUMIN SERPL BCP-MCNC: 2.5 G/DL (ref 3.4–5)
ALBUMIN/GLOB SERPL: 0.7 {RATIO} (ref 1.1–1.5)
ALP SERPL-CCNC: 102 IU/L (ref 46–116)
ALT SERPL W P-5'-P-CCNC: 29 U/L (ref 12–78)
ANION GAP SERPL CALCULATED.3IONS-SCNC: 11 MMOL/L (ref 8–16)
ANION GAP SERPL CALCULATED.3IONS-SCNC: 12 MMOL/L (ref 8–16)
ANION GAP SERPL CALCULATED.3IONS-SCNC: 13 MMOL/L (ref 8–16)
APTT PPP: (no result) SECONDS (ref 22–32)
APTT PPP: 92 SECONDS (ref 22–32)
AST SERPL W P-5'-P-CCNC: 64 U/L (ref 10–37)
BASE EXCESS BLDA CALC-SCNC: -7.9 MMOL/L (ref -2–3)
BASE EXCESS BLDA CALC-SCNC: -9.4 MMOL/L (ref -2–3)
BASOPHILS # BLD AUTO: 0 X10'3 (ref 0–0.2)
BASOPHILS # BLD AUTO: 0 X10'3 (ref 0–0.2)
BASOPHILS NFR BLD AUTO: 0.2 % (ref 0–1)
BASOPHILS NFR BLD AUTO: 0.2 % (ref 0–1)
BILIRUB SERPL-MCNC: 1.3 MG/DL (ref 0.1–1)
BODY TEMPERATURE: 33
BODY TEMPERATURE: 37
BUN SERPL-MCNC: 28 MG/DL (ref 7–18)
BUN SERPL-MCNC: 29 MG/DL (ref 7–18)
BUN SERPL-MCNC: 30 MG/DL (ref 7–18)
BUN/CREAT SERPL: 16.4 (ref 5.4–32)
BUN/CREAT SERPL: 18.1 (ref 5.4–32)
BUN/CREAT SERPL: 18.3 (ref 5.4–32)
CALCIUM SERPL-MCNC: 7.4 MG/DL (ref 8.5–10.1)
CALCIUM SERPL-MCNC: 7.5 MG/DL (ref 8.5–10.1)
CALCIUM SERPL-MCNC: 7.6 MG/DL (ref 8.5–10.1)
CHLORIDE SERPL-SCNC: 100 MMOL/L (ref 99–107)
CHLORIDE SERPL-SCNC: 100 MMOL/L (ref 99–107)
CHLORIDE SERPL-SCNC: 98 MMOL/L (ref 99–107)
CHOLEST SERPL-MCNC: 83 MG/DL (ref 0–200)
CHOLEST/HDLC SERPL: 2 {RATIO} (ref 0–4.99)
CO2 SERPL-SCNC: 18.5 MMOL/L (ref 24–32)
CO2 SERPL-SCNC: 20.1 MMOL/L (ref 24–32)
CO2 SERPL-SCNC: 21.1 MMOL/L (ref 24–32)
COHGB MFR BLDA: 0 % (ref 0.5–1.5)
COHGB MFR BLDA: 0.3 % (ref 0.5–1.5)
CREAT SERPL-MCNC: 1.6 MG/DL (ref 0.6–1.1)
CREAT SERPL-MCNC: 1.64 MG/DL (ref 0.6–1.1)
CREAT SERPL-MCNC: 1.71 MG/DL (ref 0.6–1.1)
EOSINOPHIL # BLD AUTO: 0 X10'3 (ref 0–0.9)
EOSINOPHIL # BLD AUTO: 0 X10'3 (ref 0–0.9)
EOSINOPHIL NFR BLD AUTO: 0 % (ref 0–6)
EOSINOPHIL NFR BLD AUTO: 0.1 % (ref 0–6)
ERYTHROCYTE [DISTWIDTH] IN BLOOD BY AUTOMATED COUNT: 13.2 % (ref 11.5–14.5)
ERYTHROCYTE [DISTWIDTH] IN BLOOD BY AUTOMATED COUNT: 13.7 % (ref 11.5–14.5)
GFR SERPL CREATININE-BSD FRML MDRD: 39 ML/MIN
GFR SERPL CREATININE-BSD FRML MDRD: 41 ML/MIN
GFR SERPL CREATININE-BSD FRML MDRD: 43 ML/MIN
GLUCOSE SERPL-MCNC: 346 MG/DL (ref 70–104)
GLUCOSE SERPL-MCNC: 360 MG/DL (ref 70–104)
GLUCOSE SERPL-MCNC: 371 MG/DL (ref 70–104)
HCO3 BLDA-SCNC: 16.5 MMOL/L (ref 22–26)
HCO3 BLDA-SCNC: 17.5 MMOL/L (ref 22–26)
HCT VFR BLD AUTO: 30.7 % (ref 42–52)
HCT VFR BLD AUTO: 32.4 % (ref 42–52)
HDLC SERPL-MCNC: 42 MG/DL (ref 35–60)
HGB BLD-MCNC: 10.2 G/DL (ref 14–17.9)
HGB BLD-MCNC: 10.9 G/DL (ref 14–17.9)
HGB BLDA-MCNC: 11.7 G/DL (ref 14–17.9)
HGB BLDA-MCNC: 11.8 G/DL (ref 14–17.9)
LDLC SERPL DIRECT ASSAY-MCNC: 35 MG/DL (ref 50–100)
LYMPHOCYTES # BLD AUTO: 0.6 X10'3 (ref 1.1–4.8)
LYMPHOCYTES # BLD AUTO: 0.9 X10'3 (ref 1.1–4.8)
LYMPHOCYTES NFR BLD AUTO: 4.8 % (ref 21–51)
LYMPHOCYTES NFR BLD AUTO: 6.1 % (ref 21–51)
MAGNESIUM SERPL-MCNC: 1.5 MG/DL (ref 1.5–2.4)
MCH RBC QN AUTO: 31 PG (ref 27–31)
MCH RBC QN AUTO: 31.3 PG (ref 27–31)
MCHC RBC AUTO-ENTMCNC: 33.1 G/DL (ref 33–36.5)
MCHC RBC AUTO-ENTMCNC: 33.7 G/DL (ref 33–36.5)
MCV RBC AUTO: 93 FL (ref 78–98)
MCV RBC AUTO: 93.5 FL (ref 78–98)
METHGB MFR BLDA: 0.2 % (ref 0.3–1.12)
METHGB MFR BLDA: 0.3 % (ref 0.3–1.12)
MONOCYTES # BLD AUTO: 1.2 X10'3 (ref 0–0.9)
MONOCYTES # BLD AUTO: 1.4 X10'3 (ref 0–0.9)
MONOCYTES NFR BLD AUTO: 9 % (ref 2–12)
MONOCYTES NFR BLD AUTO: 9.3 % (ref 2–12)
NEUTROPHILS # BLD AUTO: 11.6 X10'3 (ref 1.8–7.7)
NEUTROPHILS # BLD AUTO: 13.1 X10'3 (ref 1.8–7.7)
NEUTROPHILS NFR BLD AUTO: 84.3 % (ref 42–75)
NEUTROPHILS NFR BLD AUTO: 86 % (ref 42–75)
O2/TOTAL GAS SETTING VFR VENT: 100 MMHG/%
O2/TOTAL GAS SETTING VFR VENT: 45 MMHG/%
OXYHGB MFR BLDA: 95.8 % (ref 94–100)
OXYHGB MFR BLDA: 98.4 % (ref 94–100)
PCO2 TEMP ADJ BLDA: 30.3 MMHG (ref 35–45)
PCO2 TEMP ADJ BLDA: 35.6 MMHG (ref 35–45)
PEEP RESPIRATORY: 8 CM H2O
PEEP RESPIRATORY: 8 CM H2O
PH TEMP ADJ BLDA: 7.31 [PH] (ref 7.35–7.45)
PH TEMP ADJ BLDA: 7.33 [PH] (ref 7.35–7.45)
PHOSPHATE SERPL-MCNC: 3.5 MG/DL (ref 2.3–4.5)
PLATELET # BLD AUTO: 182 X10'3 (ref 140–440)
PLATELET # BLD AUTO: 218 X10'3 (ref 140–440)
PMV BLD AUTO: 7.7 FL (ref 7.4–10.4)
PMV BLD AUTO: 8.3 FL (ref 7.4–10.4)
PO2 TEMP ADJ BLD: 119.9 MMHG (ref 83–108)
PO2 TEMP ADJ BLD: 85.4 MMHG (ref 83–108)
PO2 TEMP ADJ BLDMV: 31 MMHG (ref 35–46)
PO2 TEMP ADJ BLDMV: 33.8 MMHG (ref 35–46)
PO2 TEMP ADJ BLDMV: 39.3 MMHG (ref 35–46)
POTASSIUM SERPL-SCNC: 2.9 MMOL/L (ref 3.5–5.1)
POTASSIUM SERPL-SCNC: 3.6 MMOL/L (ref 3.5–5.1)
POTASSIUM SERPL-SCNC: 4.3 MMOL/L (ref 3.5–5.1)
PROT SERPL-MCNC: 6.2 G/DL (ref 6.4–8.2)
RBC # BLD AUTO: 3.28 X10'6 (ref 4.7–6.1)
RBC # BLD AUTO: 3.48 X10'6 (ref 4.7–6.1)
RESP RATE 1H: 18 B/MIN
RESP RATE 1H: 18 B/MIN
RESP RATE RESTING: 18 B/MIN
SAO2 % BLDA: 96.4 % (ref 95–98)
SAO2 % BLDA: 98.6 % (ref 95–98)
SAO2 % BLDMV: 68.8 % (ref 60–80)
SAO2 % BLDMV: 74.2 % (ref 60–80)
SAO2 % BLDMV: 75.4 % (ref 60–80)
SODIUM SERPL-SCNC: 130 MMOL/L (ref 135–145)
SODIUM SERPL-SCNC: 130 MMOL/L (ref 135–145)
SODIUM SERPL-SCNC: 133 MMOL/L (ref 135–145)
SPONT VT COMPRES GAS VOL SET UNCOR VENT: 450 ML
SPONT VT COMPRES GAS VOL SET UNCOR VENT: 450 ML
TRIGL SERPL-MCNC: 53 MG/DL (ref 20–135)
TROPONIN I SERPL-MCNC: 12.85 NG/ML (ref 0–0.05)
TROPONIN I SERPL-MCNC: 9.1 NG/ML (ref 0–0.05)
VOL.EXP/M/BW ON VENT: 9 L/MIN
VOL.EXP/M/BW ON VENT: 9 L/MIN
WBC # BLD AUTO: 13.5 X10'3 (ref 4.5–11)
WBC # BLD AUTO: 15.6 X10'3 (ref 4.5–11)

## 2019-12-03 PROCEDURE — 06HY33Z INSERTION OF INFUSION DEVICE INTO LOWER VEIN, PERCUTANEOUS APPROACH: ICD-10-PCS | Performed by: INTERNAL MEDICINE

## 2019-12-03 RX ADMIN — IPRATROPIUM BROMIDE AND ALBUTEROL SULFATE SCH ML: .5; 3 SOLUTION RESPIRATORY (INHALATION) at 11:00

## 2019-12-03 RX ADMIN — IPRATROPIUM BROMIDE AND ALBUTEROL SULFATE SCH ML: .5; 3 SOLUTION RESPIRATORY (INHALATION) at 15:00

## 2019-12-03 RX ADMIN — HEPARIN SODIUM AND DEXTROSE SCH MLS/HR: 10000; 5 INJECTION INTRAVENOUS at 17:08

## 2019-12-03 RX ADMIN — Medication SCH MLS/HR: at 19:59

## 2019-12-03 RX ADMIN — INSULIN HUMAN SCH MLS/HR: 100 INJECTION, SOLUTION PARENTERAL at 21:09

## 2019-12-03 RX ADMIN — VANCOMYCIN HYDROCHLORIDE SCH MLS/HR: 1.25 INJECTION, POWDER, LYOPHILIZED, FOR SOLUTION INTRAVENOUS at 13:00

## 2019-12-03 RX ADMIN — IPRATROPIUM BROMIDE AND ALBUTEROL SULFATE SCH ML: .5; 3 SOLUTION RESPIRATORY (INHALATION) at 11:45

## 2019-12-03 RX ADMIN — MINERAL OIL, PETROLATUM SCH INCH: 425; 568 OINTMENT OPHTHALMIC at 19:59

## 2019-12-03 RX ADMIN — DOPAMINE HYDROCHLORIDE IN DEXTROSE SCH MLS/HR: 1.6 INJECTION, SOLUTION INTRAVENOUS at 00:31

## 2019-12-03 RX ADMIN — Medication PRN MLS/HR: at 13:37

## 2019-12-03 RX ADMIN — Medication PRN MLS/HR: at 04:23

## 2019-12-03 RX ADMIN — IPRATROPIUM BROMIDE AND ALBUTEROL SULFATE SCH ML: .5; 3 SOLUTION RESPIRATORY (INHALATION) at 19:10

## 2019-12-03 RX ADMIN — POTASSIUM CHLORIDE PRN MLS/HR: 14.9 INJECTION, SOLUTION INTRAVENOUS at 08:44

## 2019-12-03 RX ADMIN — IPRATROPIUM BROMIDE AND ALBUTEROL SULFATE SCH ML: .5; 3 SOLUTION RESPIRATORY (INHALATION) at 22:52

## 2019-12-03 RX ADMIN — INSULIN HUMAN SCH MLS/HR: 100 INJECTION, SOLUTION PARENTERAL at 09:00

## 2019-12-03 RX ADMIN — DOBUTAMINE IN DEXTROSE SCH MLS/HR: 200 INJECTION, SOLUTION INTRAVENOUS at 13:28

## 2019-12-03 RX ADMIN — POTASSIUM CHLORIDE PRN MLS/HR: 14.9 INJECTION, SOLUTION INTRAVENOUS at 09:54

## 2019-12-03 RX ADMIN — IPRATROPIUM BROMIDE AND ALBUTEROL SULFATE SCH ML: .5; 3 SOLUTION RESPIRATORY (INHALATION) at 15:44

## 2019-12-03 RX ADMIN — CEFEPIME HYDROCHLORIDE SCH MLS/HR: 1 INJECTION, SOLUTION INTRAVENOUS at 12:21

## 2019-12-03 RX ADMIN — POTASSIUM CHLORIDE PRN MLS/HR: 14.9 INJECTION, SOLUTION INTRAVENOUS at 11:51

## 2019-12-03 RX ADMIN — IPRATROPIUM BROMIDE AND ALBUTEROL SULFATE SCH ML: .5; 3 SOLUTION RESPIRATORY (INHALATION) at 23:00

## 2019-12-03 RX ADMIN — DOBUTAMINE IN DEXTROSE SCH MLS/HR: 200 INJECTION, SOLUTION INTRAVENOUS at 23:46

## 2019-12-03 RX ADMIN — DOBUTAMINE IN DEXTROSE SCH MLS/HR: 200 INJECTION, SOLUTION INTRAVENOUS at 05:52

## 2019-12-03 RX ADMIN — INSULIN GLARGINE SCH UNIT: 100 INJECTION, SOLUTION SUBCUTANEOUS at 21:00

## 2019-12-03 RX ADMIN — IPRATROPIUM BROMIDE AND ALBUTEROL SULFATE SCH ML: .5; 3 SOLUTION RESPIRATORY (INHALATION) at 06:51

## 2019-12-03 RX ADMIN — POTASSIUM CHLORIDE PRN MLS/HR: 14.9 INJECTION, SOLUTION INTRAVENOUS at 10:52

## 2019-12-03 RX ADMIN — IPRATROPIUM BROMIDE AND ALBUTEROL SULFATE SCH ML: .5; 3 SOLUTION RESPIRATORY (INHALATION) at 03:17

## 2019-12-03 NOTE — NUR
1830..Patient in room ICU 2039. I have received report from Erich GONZALEZ and had the 
opportunity to ask questions and assume patient care. All vasoactive drugs infusing via iv 
pumps to central lines.

## 2019-12-03 NOTE — NUR
pt still is not following commands, he does do reflex hand grasp bilat when running a pen 
down the palm of his hand he does close it, he does not track with his eyes or move 
extremities when spoken to

## 2019-12-03 NOTE — NUR
pt has no next of kin listed, discussed with [place] nursing supervisor. she stated that pt 
refused to give next of kin,/ emergency contact information that he stated that "he didn't 
want her to worry." Discussed in critical care rounds and  Sujey will follow up.

## 2019-12-03 NOTE — NUR
dr riddle called and updated (decreased dobutamine, current hemodynamics, and echo in 
progress). dr hunt here placed cl in r fem. received insulin gtt k 2.9 replacing (holding off 
on starting insulin gtt until 1st 20 meq kcl given)

## 2019-12-03 NOTE — NUR
ectopy noted when dobutamine increase back to 5mcg, weaned of dopamine, levophed started for 
low svr/low bp. continue to have frequent pvcs. dobutamine turned to 4 mcg and slow ectopy 
resolved. mixed svo2 draw from pa distal port svo2 75

## 2019-12-04 VITALS — SYSTOLIC BLOOD PRESSURE: 82 MMHG | DIASTOLIC BLOOD PRESSURE: 42 MMHG

## 2019-12-04 VITALS — DIASTOLIC BLOOD PRESSURE: 39 MMHG | SYSTOLIC BLOOD PRESSURE: 83 MMHG

## 2019-12-04 VITALS — DIASTOLIC BLOOD PRESSURE: 42 MMHG | SYSTOLIC BLOOD PRESSURE: 94 MMHG

## 2019-12-04 VITALS — SYSTOLIC BLOOD PRESSURE: 87 MMHG | DIASTOLIC BLOOD PRESSURE: 42 MMHG

## 2019-12-04 VITALS — DIASTOLIC BLOOD PRESSURE: 35 MMHG | SYSTOLIC BLOOD PRESSURE: 72 MMHG

## 2019-12-04 VITALS — DIASTOLIC BLOOD PRESSURE: 45 MMHG | SYSTOLIC BLOOD PRESSURE: 85 MMHG

## 2019-12-04 VITALS — SYSTOLIC BLOOD PRESSURE: 80 MMHG | DIASTOLIC BLOOD PRESSURE: 41 MMHG

## 2019-12-04 VITALS — DIASTOLIC BLOOD PRESSURE: 40 MMHG | SYSTOLIC BLOOD PRESSURE: 84 MMHG

## 2019-12-04 VITALS — DIASTOLIC BLOOD PRESSURE: 40 MMHG | SYSTOLIC BLOOD PRESSURE: 82 MMHG

## 2019-12-04 VITALS — DIASTOLIC BLOOD PRESSURE: 39 MMHG | SYSTOLIC BLOOD PRESSURE: 75 MMHG

## 2019-12-04 VITALS — SYSTOLIC BLOOD PRESSURE: 79 MMHG | DIASTOLIC BLOOD PRESSURE: 40 MMHG

## 2019-12-04 VITALS — SYSTOLIC BLOOD PRESSURE: 80 MMHG | DIASTOLIC BLOOD PRESSURE: 40 MMHG

## 2019-12-04 VITALS — DIASTOLIC BLOOD PRESSURE: 43 MMHG | SYSTOLIC BLOOD PRESSURE: 87 MMHG

## 2019-12-04 VITALS — DIASTOLIC BLOOD PRESSURE: 40 MMHG | SYSTOLIC BLOOD PRESSURE: 93 MMHG

## 2019-12-04 VITALS — SYSTOLIC BLOOD PRESSURE: 81 MMHG | DIASTOLIC BLOOD PRESSURE: 40 MMHG

## 2019-12-04 VITALS — SYSTOLIC BLOOD PRESSURE: 93 MMHG | DIASTOLIC BLOOD PRESSURE: 42 MMHG

## 2019-12-04 VITALS — SYSTOLIC BLOOD PRESSURE: 87 MMHG | DIASTOLIC BLOOD PRESSURE: 40 MMHG

## 2019-12-04 VITALS — DIASTOLIC BLOOD PRESSURE: 41 MMHG | SYSTOLIC BLOOD PRESSURE: 85 MMHG

## 2019-12-04 VITALS — SYSTOLIC BLOOD PRESSURE: 80 MMHG | DIASTOLIC BLOOD PRESSURE: 42 MMHG

## 2019-12-04 VITALS — DIASTOLIC BLOOD PRESSURE: 40 MMHG | SYSTOLIC BLOOD PRESSURE: 91 MMHG

## 2019-12-04 VITALS — SYSTOLIC BLOOD PRESSURE: 80 MMHG | DIASTOLIC BLOOD PRESSURE: 38 MMHG

## 2019-12-04 LAB
ALBUMIN SERPL BCP-MCNC: 2 G/DL (ref 3.4–5)
ALBUMIN SERPL BCP-MCNC: 2 G/DL (ref 3.4–5)
ALBUMIN SERPL BCP-MCNC: 2.1 G/DL (ref 3.4–5)
ALBUMIN SERPL BCP-MCNC: 2.1 G/DL (ref 3.4–5)
ALBUMIN/GLOB SERPL: 0.6 {RATIO} (ref 1.1–1.5)
ALP SERPL-CCNC: 80 IU/L (ref 46–116)
ALT SERPL W P-5'-P-CCNC: 27 U/L (ref 12–78)
ANION GAP SERPL CALCULATED.3IONS-SCNC: 10 MMOL/L (ref 8–16)
ANION GAP SERPL CALCULATED.3IONS-SCNC: 10 MMOL/L (ref 8–16)
ANION GAP SERPL CALCULATED.3IONS-SCNC: 12 MMOL/L (ref 8–16)
ANION GAP SERPL CALCULATED.3IONS-SCNC: 14 MMOL/L (ref 8–16)
APTT PPP: 40 SECONDS (ref 22–32)
AST SERPL W P-5'-P-CCNC: 59 U/L (ref 10–37)
BASE EXCESS BLDA CALC-SCNC: -8.4 MMOL/L (ref -2–3)
BASE EXCESS BLDA CALC-SCNC: -8.6 MMOL/L (ref -2–3)
BASE EXCESS BLDA CALC-SCNC: -9.3 MMOL/L (ref -2–3)
BASOPHILS # BLD AUTO: 0 X10'3 (ref 0–0.2)
BASOPHILS NFR BLD AUTO: 0.4 % (ref 0–1)
BILIRUB SERPL-MCNC: 1.1 MG/DL (ref 0.1–1)
BODY TEMPERATURE: 33.5
BODY TEMPERATURE: 35.6
BODY TEMPERATURE: 36
BUN SERPL-MCNC: 26 MG/DL (ref 7–18)
BUN SERPL-MCNC: 27 MG/DL (ref 7–18)
BUN SERPL-MCNC: 27 MG/DL (ref 7–18)
BUN SERPL-MCNC: 29 MG/DL (ref 7–18)
BUN/CREAT SERPL: 13.1 (ref 5.4–32)
BUN/CREAT SERPL: 14.2 (ref 5.4–32)
BUN/CREAT SERPL: 14.5 (ref 5.4–32)
BUN/CREAT SERPL: 17.2 (ref 5.4–32)
CALCIUM SERPL-MCNC: 6.9 MG/DL (ref 8.5–10.1)
CALCIUM SERPL-MCNC: 6.9 MG/DL (ref 8.5–10.1)
CALCIUM SERPL-MCNC: 7.2 MG/DL (ref 8.5–10.1)
CALCIUM SERPL-MCNC: 7.5 MG/DL (ref 8.5–10.1)
CHLORIDE SERPL-SCNC: 105 MMOL/L (ref 99–107)
CK MB SERPL-RTO: 10.3 RATIO (ref 0–2.5)
CK SERPL-CCNC: 254 U/L (ref 39–308)
CO2 SERPL-SCNC: 18.3 MMOL/L (ref 24–32)
CO2 SERPL-SCNC: 19 MMOL/L (ref 24–32)
CO2 SERPL-SCNC: 20.4 MMOL/L (ref 24–32)
CO2 SERPL-SCNC: 21.4 MMOL/L (ref 24–32)
COHGB MFR BLDA: 0.3 % (ref 0.5–1.5)
COHGB MFR BLDA: 0.3 % (ref 0.5–1.5)
COHGB MFR BLDA: 0.5 % (ref 0.5–1.5)
CREAT SERPL-MCNC: 1.57 MG/DL (ref 0.6–1.1)
CREAT SERPL-MCNC: 1.79 MG/DL (ref 0.6–1.1)
CREAT SERPL-MCNC: 1.9 MG/DL (ref 0.6–1.1)
CREAT SERPL-MCNC: 2.22 MG/DL (ref 0.6–1.1)
EOSINOPHIL # BLD AUTO: 0.1 X10'3 (ref 0–0.9)
EOSINOPHIL NFR BLD AUTO: 0.9 % (ref 0–6)
ERYTHROCYTE [DISTWIDTH] IN BLOOD BY AUTOMATED COUNT: 13.5 % (ref 11.5–14.5)
GFR SERPL CREATININE-BSD FRML MDRD: 29 ML/MIN
GFR SERPL CREATININE-BSD FRML MDRD: 35 ML/MIN
GFR SERPL CREATININE-BSD FRML MDRD: 37 ML/MIN
GFR SERPL CREATININE-BSD FRML MDRD: 44 ML/MIN
GLUCOSE SERPL-MCNC: 117 MG/DL (ref 70–104)
GLUCOSE SERPL-MCNC: 121 MG/DL (ref 70–104)
GLUCOSE SERPL-MCNC: 130 MG/DL (ref 70–104)
GLUCOSE SERPL-MCNC: 147 MG/DL (ref 70–104)
HCO3 BLDA-SCNC: 15.5 MMOL/L (ref 22–26)
HCO3 BLDA-SCNC: 16.7 MMOL/L (ref 22–26)
HCO3 BLDA-SCNC: 16.8 MMOL/L (ref 22–26)
HCT VFR BLD AUTO: 28.1 % (ref 42–52)
HGB BLD-MCNC: 9.7 G/DL (ref 14–17.9)
HGB BLDA-MCNC: 10.3 G/DL (ref 14–17.9)
HGB BLDA-MCNC: 10.7 G/DL (ref 14–17.9)
HGB BLDA-MCNC: 13.6 G/DL (ref 14–17.9)
LYMPHOCYTES # BLD AUTO: 1.3 X10'3 (ref 1.1–4.8)
LYMPHOCYTES NFR BLD AUTO: 10.8 % (ref 21–51)
MAGNESIUM SERPL-MCNC: 1.3 MG/DL (ref 1.5–2.4)
MAGNESIUM SERPL-MCNC: 1.4 MG/DL (ref 1.5–2.4)
MAGNESIUM SERPL-MCNC: 1.6 MG/DL (ref 1.5–2.4)
MAGNESIUM SERPL-MCNC: 1.7 MG/DL (ref 1.5–2.4)
MCH RBC QN AUTO: 31.8 PG (ref 27–31)
MCHC RBC AUTO-ENTMCNC: 34.5 G/DL (ref 33–36.5)
MCV RBC AUTO: 92.3 FL (ref 78–98)
METHGB MFR BLDA: 0 % (ref 0.3–1.12)
METHGB MFR BLDA: 0.1 % (ref 0.3–1.12)
METHGB MFR BLDA: 0.2 % (ref 0.3–1.12)
MONOCYTES # BLD AUTO: 1.4 X10'3 (ref 0–0.9)
MONOCYTES NFR BLD AUTO: 11.1 % (ref 2–12)
NEUTROPHILS # BLD AUTO: 9.6 X10'3 (ref 1.8–7.7)
NEUTROPHILS NFR BLD AUTO: 76.8 % (ref 42–75)
O2/TOTAL GAS SETTING VFR VENT: 30 MMHG/%
O2/TOTAL GAS SETTING VFR VENT: 30 MMHG/%
O2/TOTAL GAS SETTING VFR VENT: 40 MMHG/%
OXYHGB MFR BLDA: 97.1 % (ref 94–100)
OXYHGB MFR BLDA: 97.5 % (ref 94–100)
OXYHGB MFR BLDA: 98.5 % (ref 94–100)
PCO2 TEMP ADJ BLDA: 28.2 MMHG (ref 35–45)
PCO2 TEMP ADJ BLDA: 28.6 MMHG (ref 35–45)
PCO2 TEMP ADJ BLDA: 32.5 MMHG (ref 35–45)
PEEP RESPIRATORY: 8 CM H2O
PEEP RESPIRATORY: 8 CM H2O
PH TEMP ADJ BLDA: 7.32 [PH] (ref 7.35–7.45)
PH TEMP ADJ BLDA: 7.35 [PH] (ref 7.35–7.45)
PH TEMP ADJ BLDA: 7.37 [PH] (ref 7.35–7.45)
PHOSPHATE SERPL-MCNC: 2.9 MG/DL (ref 2.3–4.5)
PLATELET # BLD AUTO: 210 X10'3 (ref 140–440)
PMV BLD AUTO: 7.8 FL (ref 7.4–10.4)
PO2 TEMP ADJ BLD: 105.5 MMHG (ref 83–108)
PO2 TEMP ADJ BLD: 138.5 MMHG (ref 83–108)
PO2 TEMP ADJ BLD: 93.1 MMHG (ref 83–108)
PO2 TEMP ADJ BLDMV: 33.1 MMHG (ref 35–46)
PO2 TEMP ADJ BLDMV: 33.7 MMHG (ref 35–46)
PO2 TEMP ADJ BLDMV: 36.4 MMHG (ref 35–46)
POTASSIUM SERPL-SCNC: 3.1 MMOL/L (ref 3.5–5.1)
POTASSIUM SERPL-SCNC: 3.9 MMOL/L (ref 3.5–5.1)
POTASSIUM SERPL-SCNC: 4.3 MMOL/L (ref 3.5–5.1)
POTASSIUM SERPL-SCNC: 4.8 MMOL/L (ref 3.5–5.1)
PROT SERPL-MCNC: 5.6 G/DL (ref 6.4–8.2)
RBC # BLD AUTO: 3.05 X10'6 (ref 4.7–6.1)
RESP RATE 1H: 18 B/MIN
RESP RATE RESTING: 18 B/MIN
RESP RATE RESTING: 19 B/MIN
RESP RATE RESTING: 21 B/MIN
SAO2 % BLDA: 97.4 % (ref 95–98)
SAO2 % BLDA: 98.2 % (ref 95–98)
SAO2 % BLDA: 98.9 % (ref 95–98)
SAO2 % BLDMV: 71.7 % (ref 60–80)
SAO2 % BLDMV: 77 % (ref 60–80)
SAO2 % BLDMV: 80.3 % (ref 60–80)
SODIUM SERPL-SCNC: 135 MMOL/L (ref 135–145)
SODIUM SERPL-SCNC: 136 MMOL/L (ref 135–145)
SODIUM SERPL-SCNC: 136 MMOL/L (ref 135–145)
SODIUM SERPL-SCNC: 137 MMOL/L (ref 135–145)
SPONT VT COMPRES GAS VOL SET UNCOR VENT: 450 ML
TROPONIN I SERPL-MCNC: 8.24 NG/ML (ref 0–0.05)
VOL.EXP/M/BW ON VENT: 6 L/MIN
VOL.EXP/M/BW ON VENT: 8 L/MIN
VOL.EXP/M/BW ON VENT: 9 L/MIN
WBC # BLD AUTO: 12.4 X10'3 (ref 4.5–11)

## 2019-12-04 RX ADMIN — IPRATROPIUM BROMIDE AND ALBUTEROL SULFATE SCH ML: .5; 3 SOLUTION RESPIRATORY (INHALATION) at 03:11

## 2019-12-04 RX ADMIN — DOBUTAMINE IN DEXTROSE SCH MLS/HR: 200 INJECTION, SOLUTION INTRAVENOUS at 11:40

## 2019-12-04 RX ADMIN — HEPARIN SODIUM AND DEXTROSE SCH MLS/HR: 10000; 5 INJECTION INTRAVENOUS at 11:40

## 2019-12-04 RX ADMIN — IPRATROPIUM BROMIDE AND ALBUTEROL SULFATE SCH ML: .5; 3 SOLUTION RESPIRATORY (INHALATION) at 11:48

## 2019-12-04 RX ADMIN — CEFEPIME HYDROCHLORIDE SCH MLS/HR: 1 INJECTION, SOLUTION INTRAVENOUS at 07:32

## 2019-12-04 RX ADMIN — Medication SCH MMU: at 19:20

## 2019-12-04 RX ADMIN — Medication SCH MLS/HR: at 11:42

## 2019-12-04 RX ADMIN — PROPOFOL SCH MLS/HR: 10 INJECTION, EMULSION INTRAVENOUS at 19:22

## 2019-12-04 RX ADMIN — INSULIN GLARGINE SCH UNIT: 100 INJECTION, SOLUTION SUBCUTANEOUS at 19:45

## 2019-12-04 RX ADMIN — SODIUM CHLORIDE SCH MLS/HR: 9 INJECTION INTRAMUSCULAR; INTRAVENOUS; SUBCUTANEOUS at 17:30

## 2019-12-04 RX ADMIN — IPRATROPIUM BROMIDE AND ALBUTEROL SULFATE SCH ML: .5; 3 SOLUTION RESPIRATORY (INHALATION) at 15:48

## 2019-12-04 RX ADMIN — PROPOFOL SCH MLS/HR: 10 INJECTION, EMULSION INTRAVENOUS at 11:43

## 2019-12-04 RX ADMIN — MINERAL OIL, PETROLATUM SCH INCH: 425; 568 OINTMENT OPHTHALMIC at 07:35

## 2019-12-04 RX ADMIN — DOBUTAMINE IN DEXTROSE SCH MLS/HR: 200 INJECTION, SOLUTION INTRAVENOUS at 17:28

## 2019-12-04 RX ADMIN — Medication PRN MLS/HR: at 12:18

## 2019-12-04 RX ADMIN — IPRATROPIUM BROMIDE AND ALBUTEROL SULFATE SCH ML: .5; 3 SOLUTION RESPIRATORY (INHALATION) at 19:11

## 2019-12-04 RX ADMIN — PROPOFOL SCH MLS/HR: 10 INJECTION, EMULSION INTRAVENOUS at 02:28

## 2019-12-04 RX ADMIN — Medication SCH MLS/HR: at 19:21

## 2019-12-04 RX ADMIN — VANCOMYCIN HYDROCHLORIDE SCH MLS/HR: 1.25 INJECTION, POWDER, LYOPHILIZED, FOR SOLUTION INTRAVENOUS at 12:23

## 2019-12-04 RX ADMIN — Medication SCH MLS/HR: at 03:36

## 2019-12-04 RX ADMIN — Medication PRN MLS/HR: at 19:23

## 2019-12-04 RX ADMIN — Medication SCH MLS/HR: at 16:13

## 2019-12-04 RX ADMIN — IPRATROPIUM BROMIDE AND ALBUTEROL SULFATE SCH ML: .5; 3 SOLUTION RESPIRATORY (INHALATION) at 23:34

## 2019-12-04 RX ADMIN — MINERAL OIL, PETROLATUM SCH INCH: 425; 568 OINTMENT OPHTHALMIC at 19:40

## 2019-12-04 RX ADMIN — MINERAL OIL, PETROLATUM SCH INCH: 425; 568 OINTMENT OPHTHALMIC at 15:00

## 2019-12-04 RX ADMIN — Medication PRN MLS/HR: at 00:15

## 2019-12-04 RX ADMIN — DOPAMINE HYDROCHLORIDE IN DEXTROSE SCH MLS/HR: 1.6 INJECTION, SOLUTION INTRAVENOUS at 01:33

## 2019-12-04 RX ADMIN — MINERAL OIL, PETROLATUM SCH INCH: 425; 568 OINTMENT OPHTHALMIC at 02:00

## 2019-12-04 NOTE — NUR
1845..Patient in room ICU 2039. I have received report from Davide GONZALEZ  and had the 
opportunity to ask questions and assume patient care.

## 2019-12-04 NOTE — NUR
0500..Critical care FNP notified of marginal urine output, no orders at this time. Pt also 
noted to be slightly posturing, with arms pulling to the core. rewarming continues, no other 
changes noted.

## 2019-12-04 NOTE — NUR
Patient in room ICU 2039. I have received report from night shift   and had the opportunity 
to ask questions and assume patient care.

## 2019-12-04 NOTE — NUR
0100..Hypothermia phase complete, pt rewarming initiated by [facility] L[facility], no other changes 
noted.

## 2019-12-04 NOTE — NUR
2100..Pt awake, eyes open, looking around room, moves all extremities, does not follow 
commands. Diprivan bolus given with good effect.

## 2019-12-04 NOTE — NUR
0620..Problems reprioritized. Patient report given, questions answered & plan of care 
reviewed with Davide GONZALEZ.

## 2019-12-05 VITALS — SYSTOLIC BLOOD PRESSURE: 86 MMHG | DIASTOLIC BLOOD PRESSURE: 47 MMHG

## 2019-12-05 VITALS — DIASTOLIC BLOOD PRESSURE: 40 MMHG | SYSTOLIC BLOOD PRESSURE: 80 MMHG

## 2019-12-05 VITALS — DIASTOLIC BLOOD PRESSURE: 40 MMHG | SYSTOLIC BLOOD PRESSURE: 84 MMHG

## 2019-12-05 VITALS — SYSTOLIC BLOOD PRESSURE: 78 MMHG | DIASTOLIC BLOOD PRESSURE: 37 MMHG

## 2019-12-05 VITALS — SYSTOLIC BLOOD PRESSURE: 96 MMHG | DIASTOLIC BLOOD PRESSURE: 47 MMHG

## 2019-12-05 VITALS — DIASTOLIC BLOOD PRESSURE: 40 MMHG | SYSTOLIC BLOOD PRESSURE: 88 MMHG

## 2019-12-05 VITALS — DIASTOLIC BLOOD PRESSURE: 44 MMHG | SYSTOLIC BLOOD PRESSURE: 91 MMHG

## 2019-12-05 VITALS — SYSTOLIC BLOOD PRESSURE: 90 MMHG | DIASTOLIC BLOOD PRESSURE: 44 MMHG

## 2019-12-05 VITALS — DIASTOLIC BLOOD PRESSURE: 46 MMHG | SYSTOLIC BLOOD PRESSURE: 83 MMHG

## 2019-12-05 VITALS — SYSTOLIC BLOOD PRESSURE: 98 MMHG | DIASTOLIC BLOOD PRESSURE: 56 MMHG

## 2019-12-05 VITALS — DIASTOLIC BLOOD PRESSURE: 43 MMHG | SYSTOLIC BLOOD PRESSURE: 84 MMHG

## 2019-12-05 VITALS — SYSTOLIC BLOOD PRESSURE: 87 MMHG | DIASTOLIC BLOOD PRESSURE: 49 MMHG

## 2019-12-05 VITALS — SYSTOLIC BLOOD PRESSURE: 88 MMHG | DIASTOLIC BLOOD PRESSURE: 40 MMHG

## 2019-12-05 VITALS — SYSTOLIC BLOOD PRESSURE: 78 MMHG | DIASTOLIC BLOOD PRESSURE: 30 MMHG

## 2019-12-05 VITALS — DIASTOLIC BLOOD PRESSURE: 41 MMHG | SYSTOLIC BLOOD PRESSURE: 82 MMHG

## 2019-12-05 VITALS — DIASTOLIC BLOOD PRESSURE: 52 MMHG | SYSTOLIC BLOOD PRESSURE: 97 MMHG

## 2019-12-05 VITALS — DIASTOLIC BLOOD PRESSURE: 44 MMHG | SYSTOLIC BLOOD PRESSURE: 88 MMHG

## 2019-12-05 VITALS — DIASTOLIC BLOOD PRESSURE: 50 MMHG | SYSTOLIC BLOOD PRESSURE: 94 MMHG

## 2019-12-05 VITALS — DIASTOLIC BLOOD PRESSURE: 40 MMHG | SYSTOLIC BLOOD PRESSURE: 74 MMHG

## 2019-12-05 VITALS — DIASTOLIC BLOOD PRESSURE: 44 MMHG | SYSTOLIC BLOOD PRESSURE: 92 MMHG

## 2019-12-05 VITALS — SYSTOLIC BLOOD PRESSURE: 90 MMHG | DIASTOLIC BLOOD PRESSURE: 43 MMHG

## 2019-12-05 VITALS — DIASTOLIC BLOOD PRESSURE: 45 MMHG | SYSTOLIC BLOOD PRESSURE: 89 MMHG

## 2019-12-05 LAB
ALBUMIN SERPL BCP-MCNC: 1.9 G/DL (ref 3.4–5)
ALBUMIN SERPL BCP-MCNC: 2 G/DL (ref 3.4–5)
ALBUMIN/GLOB SERPL: 0.6 {RATIO} (ref 1.1–1.5)
ALP SERPL-CCNC: 85 IU/L (ref 46–116)
ALT SERPL W P-5'-P-CCNC: 20 U/L (ref 12–78)
ANION GAP SERPL CALCULATED.3IONS-SCNC: 11 MMOL/L (ref 8–16)
ANION GAP SERPL CALCULATED.3IONS-SCNC: 12 MMOL/L (ref 8–16)
APTT PPP: 47 SECONDS (ref 22–32)
AST SERPL W P-5'-P-CCNC: 50 U/L (ref 10–37)
BASE EXCESS BLDA CALC-SCNC: -8.4 MMOL/L (ref -2–3)
BASOPHILS # BLD AUTO: 0 X10'3 (ref 0–0.2)
BASOPHILS NFR BLD AUTO: 0.2 % (ref 0–1)
BILIRUB SERPL-MCNC: 1.5 MG/DL (ref 0.1–1)
BODY TEMPERATURE: 37.8
BUN SERPL-MCNC: 30 MG/DL (ref 7–18)
BUN SERPL-MCNC: 31 MG/DL (ref 7–18)
BUN/CREAT SERPL: 11.6 (ref 5.4–32)
BUN/CREAT SERPL: 13 (ref 5.4–32)
CALCIUM SERPL-MCNC: 6.8 MG/DL (ref 8.5–10.1)
CALCIUM SERPL-MCNC: 7.1 MG/DL (ref 8.5–10.1)
CHLORIDE SERPL-SCNC: 102 MMOL/L (ref 99–107)
CHLORIDE SERPL-SCNC: 107 MMOL/L (ref 99–107)
CO2 SERPL-SCNC: 17.4 MMOL/L (ref 24–32)
CO2 SERPL-SCNC: 19.7 MMOL/L (ref 24–32)
COHGB MFR BLDA: 0.3 % (ref 0.5–1.5)
CREAT SERPL-MCNC: 2.38 MG/DL (ref 0.6–1.1)
CREAT SERPL-MCNC: 2.58 MG/DL (ref 0.6–1.1)
EOSINOPHIL # BLD AUTO: 0 X10'3 (ref 0–0.9)
EOSINOPHIL NFR BLD AUTO: 0.3 % (ref 0–6)
ERYTHROCYTE [DISTWIDTH] IN BLOOD BY AUTOMATED COUNT: 13.8 % (ref 11.5–14.5)
GFR SERPL CREATININE-BSD FRML MDRD: 25 ML/MIN
GFR SERPL CREATININE-BSD FRML MDRD: 27 ML/MIN
GLUCOSE SERPL-MCNC: 144 MG/DL (ref 70–104)
GLUCOSE SERPL-MCNC: 163 MG/DL (ref 70–104)
HCO3 BLDA-SCNC: 16.3 MMOL/L (ref 22–26)
HCT VFR BLD AUTO: 24.6 % (ref 42–52)
HGB BLD-MCNC: 8.3 G/DL (ref 14–17.9)
HGB BLDA-MCNC: 9.3 G/DL (ref 14–17.9)
LYMPHOCYTES # BLD AUTO: 1.4 X10'3 (ref 1.1–4.8)
LYMPHOCYTES NFR BLD AUTO: 8.8 % (ref 21–51)
MAGNESIUM SERPL-MCNC: 1.7 MG/DL (ref 1.5–2.4)
MAGNESIUM SERPL-MCNC: 1.8 MG/DL (ref 1.5–2.4)
MCH RBC QN AUTO: 31.3 PG (ref 27–31)
MCHC RBC AUTO-ENTMCNC: 33.9 G/DL (ref 33–36.5)
MCV RBC AUTO: 92.2 FL (ref 78–98)
METHGB MFR BLDA: 0.1 % (ref 0.3–1.12)
MONOCYTES # BLD AUTO: 1.8 X10'3 (ref 0–0.9)
MONOCYTES NFR BLD AUTO: 10.6 % (ref 2–12)
NEUTROPHILS # BLD AUTO: 13.2 X10'3 (ref 1.8–7.7)
NEUTROPHILS NFR BLD AUTO: 80.1 % (ref 42–75)
O2/TOTAL GAS SETTING VFR VENT: 40 MMHG/%
OXYHGB MFR BLDA: 97.9 % (ref 94–100)
PCO2 TEMP ADJ BLDA: 31.5 MMHG (ref 35–45)
PEEP RESPIRATORY: 8 CM H2O
PH TEMP ADJ BLDA: 7.33 [PH] (ref 7.35–7.45)
PHOSPHATE SERPL-MCNC: 3.8 MG/DL (ref 2.3–4.5)
PLATELET # BLD AUTO: 194 X10'3 (ref 140–440)
PMV BLD AUTO: 7.8 FL (ref 7.4–10.4)
PO2 TEMP ADJ BLD: 129.8 MMHG (ref 83–108)
PO2 TEMP ADJ BLDMV: 36.3 MMHG (ref 35–46)
POTASSIUM SERPL-SCNC: 4.2 MMOL/L (ref 3.5–5.1)
POTASSIUM SERPL-SCNC: 4.7 MMOL/L (ref 3.5–5.1)
PROT SERPL-MCNC: 5.3 G/DL (ref 6.4–8.2)
RBC # BLD AUTO: 2.67 X10'6 (ref 4.7–6.1)
RESP RATE 1H: 18 B/MIN
RESP RATE RESTING: 18 B/MIN
SAO2 % BLDA: 98.3 % (ref 95–98)
SAO2 % BLDMV: 69.7 % (ref 60–80)
SODIUM SERPL-SCNC: 134 MMOL/L (ref 135–145)
SODIUM SERPL-SCNC: 135 MMOL/L (ref 135–145)
SPONT VT COMPRES GAS VOL SET UNCOR VENT: 450 ML
TRIGL SERPL-MCNC: 148 MG/DL (ref 20–135)
VOL.EXP/M/BW ON VENT: 9 L/MIN
WBC # BLD AUTO: 16.4 X10'3 (ref 4.5–11)

## 2019-12-05 RX ADMIN — MINERAL OIL, PETROLATUM SCH INCH: 425; 568 OINTMENT OPHTHALMIC at 07:41

## 2019-12-05 RX ADMIN — CEFEPIME HYDROCHLORIDE SCH MLS/HR: 1 INJECTION, SOLUTION INTRAVENOUS at 07:37

## 2019-12-05 RX ADMIN — INSULIN HUMAN SCH MLS/HR: 100 INJECTION, SOLUTION PARENTERAL at 01:29

## 2019-12-05 RX ADMIN — PROPOFOL SCH MLS/HR: 10 INJECTION, EMULSION INTRAVENOUS at 02:37

## 2019-12-05 RX ADMIN — INSULIN HUMAN SCH MLS/HR: 100 INJECTION, SOLUTION PARENTERAL at 23:19

## 2019-12-05 RX ADMIN — Medication SCH MLS/HR: at 02:38

## 2019-12-05 RX ADMIN — IPRATROPIUM BROMIDE AND ALBUTEROL SULFATE SCH ML: .5; 3 SOLUTION RESPIRATORY (INHALATION) at 23:03

## 2019-12-05 RX ADMIN — DOBUTAMINE IN DEXTROSE SCH MLS/HR: 200 INJECTION, SOLUTION INTRAVENOUS at 02:37

## 2019-12-05 RX ADMIN — SODIUM CHLORIDE SCH MLS/HR: 9 INJECTION INTRAMUSCULAR; INTRAVENOUS; SUBCUTANEOUS at 11:41

## 2019-12-05 RX ADMIN — HEPARIN SODIUM AND DEXTROSE SCH MLS/HR: 10000; 5 INJECTION INTRAVENOUS at 12:50

## 2019-12-05 RX ADMIN — IPRATROPIUM BROMIDE AND ALBUTEROL SULFATE SCH ML: .5; 3 SOLUTION RESPIRATORY (INHALATION) at 03:29

## 2019-12-05 RX ADMIN — DOBUTAMINE IN DEXTROSE SCH MLS/HR: 200 INJECTION, SOLUTION INTRAVENOUS at 19:31

## 2019-12-05 RX ADMIN — VANCOMYCIN HYDROCHLORIDE SCH MLS/HR: 1.25 INJECTION, POWDER, LYOPHILIZED, FOR SOLUTION INTRAVENOUS at 11:40

## 2019-12-05 RX ADMIN — IPRATROPIUM BROMIDE AND ALBUTEROL SULFATE SCH ML: .5; 3 SOLUTION RESPIRATORY (INHALATION) at 18:54

## 2019-12-05 RX ADMIN — Medication PRN MLS/HR: at 14:44

## 2019-12-05 RX ADMIN — IPRATROPIUM BROMIDE AND ALBUTEROL SULFATE SCH ML: .5; 3 SOLUTION RESPIRATORY (INHALATION) at 10:45

## 2019-12-05 RX ADMIN — MINERAL OIL, PETROLATUM SCH INCH: 425; 568 OINTMENT OPHTHALMIC at 13:48

## 2019-12-05 RX ADMIN — DOBUTAMINE IN DEXTROSE SCH MLS/HR: 200 INJECTION, SOLUTION INTRAVENOUS at 11:40

## 2019-12-05 RX ADMIN — Medication SCH MMU: at 19:30

## 2019-12-05 RX ADMIN — PROPOFOL SCH MLS/HR: 10 INJECTION, EMULSION INTRAVENOUS at 12:39

## 2019-12-05 RX ADMIN — Medication SCH MLS/HR: at 14:59

## 2019-12-05 RX ADMIN — IPRATROPIUM BROMIDE AND ALBUTEROL SULFATE SCH ML: .5; 3 SOLUTION RESPIRATORY (INHALATION) at 06:52

## 2019-12-05 RX ADMIN — Medication SCH MLS/HR: at 08:08

## 2019-12-05 RX ADMIN — MINERAL OIL, PETROLATUM SCH INCH: 425; 568 OINTMENT OPHTHALMIC at 02:36

## 2019-12-05 RX ADMIN — MINERAL OIL, PETROLATUM SCH INCH: 425; 568 OINTMENT OPHTHALMIC at 19:29

## 2019-12-05 RX ADMIN — Medication SCH MMU: at 07:37

## 2019-12-05 RX ADMIN — Medication PRN MLS/HR: at 13:19

## 2019-12-05 RX ADMIN — IPRATROPIUM BROMIDE AND ALBUTEROL SULFATE SCH ML: .5; 3 SOLUTION RESPIRATORY (INHALATION) at 14:12

## 2019-12-05 NOTE — NUR
1830..Patient in room ICU 2039. I have received report from Davide GONZALEZ and had the 
opportunity to ask questions and assume patient care.

## 2019-12-05 NOTE — NUR
TF Consult: OGTF to start today per MD s/p rewarming. TF recs below given 

pt needs. Will monitor for TF tolerance.

Recommend:

1. OGTF using Vital High Protein at 75 ml/hr to provide total volume of

1800 ml, 1800 cals, 158g protein and 1512ml water. Initiate at 20ml/hr and 

advance 20ml Q8 to goal as tolerated.

2. prealbumin q Monday and Thursday, daily weights

3. additional water flush per intensivist; Na 135

4. monitor for TF tolerance

5. When extubated and alert needs written and verbal DM education, A1c is

11.6

-------------------------------------------------------------------------------

Addendum: 12/05/19 at 1610 by Mal Greene RD

-------------------------------------------------------------------------------

Amended: Links added.

## 2019-12-06 VITALS — DIASTOLIC BLOOD PRESSURE: 41 MMHG | SYSTOLIC BLOOD PRESSURE: 107 MMHG

## 2019-12-06 VITALS — SYSTOLIC BLOOD PRESSURE: 118 MMHG | DIASTOLIC BLOOD PRESSURE: 53 MMHG

## 2019-12-06 VITALS — SYSTOLIC BLOOD PRESSURE: 100 MMHG | DIASTOLIC BLOOD PRESSURE: 40 MMHG

## 2019-12-06 VITALS — SYSTOLIC BLOOD PRESSURE: 97 MMHG | DIASTOLIC BLOOD PRESSURE: 51 MMHG

## 2019-12-06 VITALS — DIASTOLIC BLOOD PRESSURE: 46 MMHG | SYSTOLIC BLOOD PRESSURE: 107 MMHG

## 2019-12-06 VITALS — SYSTOLIC BLOOD PRESSURE: 107 MMHG | DIASTOLIC BLOOD PRESSURE: 39 MMHG

## 2019-12-06 VITALS — SYSTOLIC BLOOD PRESSURE: 103 MMHG | DIASTOLIC BLOOD PRESSURE: 31 MMHG

## 2019-12-06 VITALS — SYSTOLIC BLOOD PRESSURE: 111 MMHG | DIASTOLIC BLOOD PRESSURE: 49 MMHG

## 2019-12-06 VITALS — SYSTOLIC BLOOD PRESSURE: 98 MMHG | DIASTOLIC BLOOD PRESSURE: 50 MMHG

## 2019-12-06 VITALS — DIASTOLIC BLOOD PRESSURE: 30 MMHG | SYSTOLIC BLOOD PRESSURE: 107 MMHG

## 2019-12-06 VITALS — SYSTOLIC BLOOD PRESSURE: 111 MMHG | DIASTOLIC BLOOD PRESSURE: 46 MMHG

## 2019-12-06 VITALS — DIASTOLIC BLOOD PRESSURE: 52 MMHG | SYSTOLIC BLOOD PRESSURE: 99 MMHG

## 2019-12-06 VITALS — DIASTOLIC BLOOD PRESSURE: 35 MMHG | SYSTOLIC BLOOD PRESSURE: 93 MMHG

## 2019-12-06 VITALS — DIASTOLIC BLOOD PRESSURE: 50 MMHG | SYSTOLIC BLOOD PRESSURE: 93 MMHG

## 2019-12-06 VITALS — DIASTOLIC BLOOD PRESSURE: 48 MMHG | SYSTOLIC BLOOD PRESSURE: 105 MMHG

## 2019-12-06 VITALS — SYSTOLIC BLOOD PRESSURE: 105 MMHG | DIASTOLIC BLOOD PRESSURE: 39 MMHG

## 2019-12-06 VITALS — SYSTOLIC BLOOD PRESSURE: 100 MMHG | DIASTOLIC BLOOD PRESSURE: 52 MMHG

## 2019-12-06 VITALS — SYSTOLIC BLOOD PRESSURE: 120 MMHG | DIASTOLIC BLOOD PRESSURE: 44 MMHG

## 2019-12-06 VITALS — DIASTOLIC BLOOD PRESSURE: 43 MMHG | SYSTOLIC BLOOD PRESSURE: 103 MMHG

## 2019-12-06 VITALS — DIASTOLIC BLOOD PRESSURE: 38 MMHG | SYSTOLIC BLOOD PRESSURE: 84 MMHG

## 2019-12-06 VITALS — SYSTOLIC BLOOD PRESSURE: 108 MMHG | DIASTOLIC BLOOD PRESSURE: 38 MMHG

## 2019-12-06 VITALS — SYSTOLIC BLOOD PRESSURE: 88 MMHG | DIASTOLIC BLOOD PRESSURE: 46 MMHG

## 2019-12-06 VITALS — DIASTOLIC BLOOD PRESSURE: 52 MMHG | SYSTOLIC BLOOD PRESSURE: 102 MMHG

## 2019-12-06 LAB
ALBUMIN SERPL BCP-MCNC: 2 G/DL (ref 3.4–5)
ALBUMIN/GLOB SERPL: 0.5 {RATIO} (ref 1.1–1.5)
ALP SERPL-CCNC: 101 IU/L (ref 46–116)
ALT SERPL W P-5'-P-CCNC: 23 U/L (ref 12–78)
ANION GAP SERPL CALCULATED.3IONS-SCNC: 8 MMOL/L (ref 8–16)
APTT PPP: 55 SECONDS (ref 22–32)
AST SERPL W P-5'-P-CCNC: 49 U/L (ref 10–37)
BASE EXCESS BLDA CALC-SCNC: -10.8 MMOL/L (ref -2–3)
BASOPHILS # BLD AUTO: 0 X10'3 (ref 0–0.2)
BASOPHILS NFR BLD AUTO: 0.1 % (ref 0–1)
BILIRUB SERPL-MCNC: 2.2 MG/DL (ref 0.1–1)
BODY TEMPERATURE: 36.8
BUN SERPL-MCNC: 36 MG/DL (ref 7–18)
BUN/CREAT SERPL: 11.4 (ref 5.4–32)
CALCIUM SERPL-MCNC: 7.2 MG/DL (ref 8.5–10.1)
CHLORIDE SERPL-SCNC: 105 MMOL/L (ref 99–107)
CO2 SERPL-SCNC: 19.9 MMOL/L (ref 24–32)
COHGB MFR BLDA: 0.3 % (ref 0.5–1.5)
CREAT SERPL-MCNC: 3.16 MG/DL (ref 0.6–1.1)
EOSINOPHIL # BLD AUTO: 0 X10'3 (ref 0–0.9)
EOSINOPHIL NFR BLD AUTO: 0 % (ref 0–6)
ERYTHROCYTE [DISTWIDTH] IN BLOOD BY AUTOMATED COUNT: 13.8 % (ref 11.5–14.5)
GFR SERPL CREATININE-BSD FRML MDRD: 19 ML/MIN
GLUCOSE SERPL-MCNC: 172 MG/DL (ref 70–104)
HCO3 BLDA-SCNC: 14.2 MMOL/L (ref 22–26)
HCT VFR BLD AUTO: 24.9 % (ref 42–52)
HGB BLD-MCNC: 8.3 G/DL (ref 14–17.9)
HGB BLDA-MCNC: 9.7 G/DL (ref 14–17.9)
LYMPHOCYTES # BLD AUTO: 0.8 X10'3 (ref 1.1–4.8)
LYMPHOCYTES NFR BLD AUTO: 5 % (ref 21–51)
MAGNESIUM SERPL-MCNC: 1.8 MG/DL (ref 1.5–2.4)
MCH RBC QN AUTO: 31.1 PG (ref 27–31)
MCHC RBC AUTO-ENTMCNC: 33.4 G/DL (ref 33–36.5)
MCV RBC AUTO: 93.2 FL (ref 78–98)
METHGB MFR BLDA: 0.1 % (ref 0.3–1.12)
MONOCYTES # BLD AUTO: 0.5 X10'3 (ref 0–0.9)
MONOCYTES NFR BLD AUTO: 3.1 % (ref 2–12)
NEUTROPHILS # BLD AUTO: 14 X10'3 (ref 1.8–7.7)
NEUTROPHILS NFR BLD AUTO: 91.8 % (ref 42–75)
O2/TOTAL GAS SETTING VFR VENT: 35 MMHG/%
OXYHGB MFR BLDA: 91.3 % (ref 94–100)
PCO2 TEMP ADJ BLDA: 28.3 MMHG (ref 35–45)
PEEP RESPIRATORY: 8 CM H2O
PH TEMP ADJ BLDA: 7.32 [PH] (ref 7.35–7.45)
PHOSPHATE SERPL-MCNC: 4.2 MG/DL (ref 2.3–4.5)
PLATELET # BLD AUTO: 163 X10'3 (ref 140–440)
PMV BLD AUTO: 7.6 FL (ref 7.4–10.4)
PO2 TEMP ADJ BLD: 63.1 MMHG (ref 83–108)
POTASSIUM SERPL-SCNC: 4.7 MMOL/L (ref 3.5–5.1)
PREALB SERPL-MCNC: 9.4 MG/DL (ref 19–36)
PROT SERPL-MCNC: 6 G/DL (ref 6.4–8.2)
RBC # BLD AUTO: 2.67 X10'6 (ref 4.7–6.1)
RESP RATE 1H: 18 B/MIN
RESP RATE RESTING: 18 B/MIN
SAO2 % BLDA: 91.7 % (ref 95–98)
SODIUM SERPL-SCNC: 133 MMOL/L (ref 135–145)
SPONT VT COMPRES GAS VOL SET UNCOR VENT: 450 ML
VOL.EXP/M/BW ON VENT: 9 L/MIN
WBC # BLD AUTO: 15.3 X10'3 (ref 4.5–11)

## 2019-12-06 RX ADMIN — DOCUSATE SODIUM SCH MG: 50 LIQUID ORAL at 20:19

## 2019-12-06 RX ADMIN — MINERAL OIL, PETROLATUM SCH INCH: 425; 568 OINTMENT OPHTHALMIC at 20:22

## 2019-12-06 RX ADMIN — IPRATROPIUM BROMIDE AND ALBUTEROL SULFATE SCH ML: .5; 3 SOLUTION RESPIRATORY (INHALATION) at 06:58

## 2019-12-06 RX ADMIN — CEFEPIME HYDROCHLORIDE SCH MLS/HR: 1 INJECTION, SOLUTION INTRAVENOUS at 07:44

## 2019-12-06 RX ADMIN — DOBUTAMINE IN DEXTROSE SCH MLS/HR: 200 INJECTION, SOLUTION INTRAVENOUS at 23:22

## 2019-12-06 RX ADMIN — MINERAL OIL, PETROLATUM SCH INCH: 425; 568 OINTMENT OPHTHALMIC at 16:26

## 2019-12-06 RX ADMIN — Medication SCH MLS/HR: at 16:28

## 2019-12-06 RX ADMIN — Medication PRN MLS/HR: at 02:31

## 2019-12-06 RX ADMIN — IPRATROPIUM BROMIDE AND ALBUTEROL SULFATE SCH ML: .5; 3 SOLUTION RESPIRATORY (INHALATION) at 11:02

## 2019-12-06 RX ADMIN — Medication SCH MMU: at 07:44

## 2019-12-06 RX ADMIN — INSULIN HUMAN SCH MLS/HR: 100 INJECTION, SOLUTION PARENTERAL at 16:29

## 2019-12-06 RX ADMIN — METHYLPREDNISOLONE SODIUM SUCCINATE SCH MG: 40 INJECTION, POWDER, FOR SOLUTION INTRAMUSCULAR; INTRAVENOUS at 20:17

## 2019-12-06 RX ADMIN — DOBUTAMINE IN DEXTROSE SCH MLS/HR: 200 INJECTION, SOLUTION INTRAVENOUS at 16:27

## 2019-12-06 RX ADMIN — IPRATROPIUM BROMIDE AND ALBUTEROL SULFATE SCH ML: .5; 3 SOLUTION RESPIRATORY (INHALATION) at 18:33

## 2019-12-06 RX ADMIN — VANCOMYCIN HYDROCHLORIDE SCH MLS/HR: 1.25 INJECTION, POWDER, LYOPHILIZED, FOR SOLUTION INTRAVENOUS at 12:52

## 2019-12-06 RX ADMIN — Medication SCH MLS/HR: at 02:23

## 2019-12-06 RX ADMIN — Medication PRN MLS/HR: at 16:30

## 2019-12-06 RX ADMIN — METHYLNALTREXONE BROMIDE SCH MG: 12 INJECTION, SOLUTION SUBCUTANEOUS at 12:51

## 2019-12-06 RX ADMIN — IPRATROPIUM BROMIDE AND ALBUTEROL SULFATE SCH ML: .5; 3 SOLUTION RESPIRATORY (INHALATION) at 15:01

## 2019-12-06 RX ADMIN — IPRATROPIUM BROMIDE AND ALBUTEROL SULFATE SCH ML: .5; 3 SOLUTION RESPIRATORY (INHALATION) at 02:37

## 2019-12-06 RX ADMIN — IPRATROPIUM BROMIDE AND ALBUTEROL SULFATE SCH ML: .5; 3 SOLUTION RESPIRATORY (INHALATION) at 22:43

## 2019-12-06 RX ADMIN — METOCLOPRAMIDE SCH MG: 5 INJECTION, SOLUTION INTRAMUSCULAR; INTRAVENOUS at 20:18

## 2019-12-06 RX ADMIN — SODIUM CHLORIDE SCH MLS/HR: 9 INJECTION INTRAMUSCULAR; INTRAVENOUS; SUBCUTANEOUS at 16:27

## 2019-12-06 RX ADMIN — METHYLPREDNISOLONE SODIUM SUCCINATE SCH MG: 40 INJECTION, POWDER, FOR SOLUTION INTRAMUSCULAR; INTRAVENOUS at 16:26

## 2019-12-06 RX ADMIN — MINERAL OIL, PETROLATUM SCH INCH: 425; 568 OINTMENT OPHTHALMIC at 02:22

## 2019-12-06 RX ADMIN — Medication SCH MMU: at 20:22

## 2019-12-06 RX ADMIN — DOBUTAMINE IN DEXTROSE SCH MLS/HR: 200 INJECTION, SOLUTION INTRAVENOUS at 03:56

## 2019-12-06 RX ADMIN — METOCLOPRAMIDE SCH MG: 5 INJECTION, SOLUTION INTRAMUSCULAR; INTRAVENOUS at 16:26

## 2019-12-06 RX ADMIN — MINERAL OIL, PETROLATUM SCH INCH: 425; 568 OINTMENT OPHTHALMIC at 07:45

## 2019-12-06 NOTE — NUR
Lee trigger: B12; skin intact.

-------------------------------------------------------------------------------

Addendum: 12/06/19 at 1204 by Mal Greene RD

-------------------------------------------------------------------------------

Amended: Links added.

## 2019-12-06 NOTE — NUR
Problems reprioritized. Patient report given, questions answered & plan of care reviewed 
with oncoming  shift.

## 2019-12-07 VITALS — SYSTOLIC BLOOD PRESSURE: 105 MMHG | DIASTOLIC BLOOD PRESSURE: 37 MMHG

## 2019-12-07 VITALS — SYSTOLIC BLOOD PRESSURE: 109 MMHG | DIASTOLIC BLOOD PRESSURE: 37 MMHG

## 2019-12-07 VITALS — SYSTOLIC BLOOD PRESSURE: 99 MMHG | DIASTOLIC BLOOD PRESSURE: 49 MMHG

## 2019-12-07 VITALS — DIASTOLIC BLOOD PRESSURE: 36 MMHG | SYSTOLIC BLOOD PRESSURE: 105 MMHG

## 2019-12-07 VITALS — SYSTOLIC BLOOD PRESSURE: 96 MMHG | DIASTOLIC BLOOD PRESSURE: 54 MMHG

## 2019-12-07 VITALS — DIASTOLIC BLOOD PRESSURE: 50 MMHG | SYSTOLIC BLOOD PRESSURE: 103 MMHG

## 2019-12-07 VITALS — DIASTOLIC BLOOD PRESSURE: 40 MMHG | SYSTOLIC BLOOD PRESSURE: 99 MMHG

## 2019-12-07 VITALS — DIASTOLIC BLOOD PRESSURE: 41 MMHG | SYSTOLIC BLOOD PRESSURE: 107 MMHG

## 2019-12-07 VITALS — SYSTOLIC BLOOD PRESSURE: 112 MMHG | DIASTOLIC BLOOD PRESSURE: 47 MMHG

## 2019-12-07 VITALS — DIASTOLIC BLOOD PRESSURE: 39 MMHG | SYSTOLIC BLOOD PRESSURE: 102 MMHG

## 2019-12-07 VITALS — DIASTOLIC BLOOD PRESSURE: 28 MMHG | SYSTOLIC BLOOD PRESSURE: 87 MMHG

## 2019-12-07 VITALS — DIASTOLIC BLOOD PRESSURE: 40 MMHG | SYSTOLIC BLOOD PRESSURE: 92 MMHG

## 2019-12-07 VITALS — SYSTOLIC BLOOD PRESSURE: 114 MMHG | DIASTOLIC BLOOD PRESSURE: 29 MMHG

## 2019-12-07 VITALS — DIASTOLIC BLOOD PRESSURE: 35 MMHG | SYSTOLIC BLOOD PRESSURE: 122 MMHG

## 2019-12-07 VITALS — SYSTOLIC BLOOD PRESSURE: 114 MMHG | DIASTOLIC BLOOD PRESSURE: 31 MMHG

## 2019-12-07 VITALS — DIASTOLIC BLOOD PRESSURE: 56 MMHG | SYSTOLIC BLOOD PRESSURE: 99 MMHG

## 2019-12-07 VITALS — DIASTOLIC BLOOD PRESSURE: 56 MMHG | SYSTOLIC BLOOD PRESSURE: 97 MMHG

## 2019-12-07 VITALS — SYSTOLIC BLOOD PRESSURE: 122 MMHG | DIASTOLIC BLOOD PRESSURE: 60 MMHG

## 2019-12-07 VITALS — DIASTOLIC BLOOD PRESSURE: 38 MMHG | SYSTOLIC BLOOD PRESSURE: 116 MMHG

## 2019-12-07 VITALS — DIASTOLIC BLOOD PRESSURE: 56 MMHG | SYSTOLIC BLOOD PRESSURE: 124 MMHG

## 2019-12-07 VITALS — SYSTOLIC BLOOD PRESSURE: 113 MMHG | DIASTOLIC BLOOD PRESSURE: 53 MMHG

## 2019-12-07 VITALS — SYSTOLIC BLOOD PRESSURE: 94 MMHG | DIASTOLIC BLOOD PRESSURE: 31 MMHG

## 2019-12-07 VITALS — SYSTOLIC BLOOD PRESSURE: 110 MMHG | DIASTOLIC BLOOD PRESSURE: 52 MMHG

## 2019-12-07 LAB
ALBUMIN SERPL BCP-MCNC: 2 G/DL (ref 3.4–5)
ALBUMIN SERPL BCP-MCNC: 2.1 G/DL (ref 3.4–5)
ALBUMIN SERPL BCP-MCNC: 2.2 G/DL (ref 3.4–5)
ALBUMIN SERPL BCP-MCNC: 2.3 G/DL (ref 3.4–5)
ALBUMIN/GLOB SERPL: 0.6 {RATIO} (ref 1.1–1.5)
ALP SERPL-CCNC: 174 IU/L (ref 46–116)
ALT SERPL W P-5'-P-CCNC: 37 U/L (ref 12–78)
ANION GAP SERPL CALCULATED.3IONS-SCNC: 10 MMOL/L (ref 8–16)
ANION GAP SERPL CALCULATED.3IONS-SCNC: 11 MMOL/L (ref 8–16)
ANION GAP SERPL CALCULATED.3IONS-SCNC: 12 MMOL/L (ref 8–16)
ANION GAP SERPL CALCULATED.3IONS-SCNC: 13 MMOL/L (ref 8–16)
ANION GAP SERPL CALCULATED.3IONS-SCNC: 13 MMOL/L (ref 8–16)
ANION GAP SERPL CALCULATED.3IONS-SCNC: 14 MMOL/L (ref 8–16)
APTT PPP: 31 SECONDS (ref 22–32)
AST SERPL W P-5'-P-CCNC: 72 U/L (ref 10–37)
BASE EXCESS BLDA CALC-SCNC: -9.4 MMOL/L (ref -2–3)
BASOPHILS # BLD AUTO: 0 X10'3 (ref 0–0.2)
BASOPHILS NFR BLD AUTO: 0 % (ref 0–1)
BASOPHILS NFR BLD AUTO: 0 % (ref 0–1)
BASOPHILS NFR BLD AUTO: 0.1 % (ref 0–1)
BILIRUB SERPL-MCNC: 2.3 MG/DL (ref 0.1–1)
BODY TEMPERATURE: 36.6
BUN SERPL-MCNC: 44 MG/DL (ref 7–18)
BUN SERPL-MCNC: 49 MG/DL (ref 7–18)
BUN SERPL-MCNC: 52 MG/DL (ref 7–18)
BUN SERPL-MCNC: 52 MG/DL (ref 7–18)
BUN SERPL-MCNC: 53 MG/DL (ref 7–18)
BUN SERPL-MCNC: 57 MG/DL (ref 7–18)
BUN/CREAT SERPL: 12.9 (ref 5.4–32)
BUN/CREAT SERPL: 13.6 (ref 5.4–32)
BUN/CREAT SERPL: 13.9 (ref 5.4–32)
BUN/CREAT SERPL: 14.1 (ref 5.4–32)
BUN/CREAT SERPL: 14.1 (ref 5.4–32)
BUN/CREAT SERPL: 14.5 (ref 5.4–32)
CALCIUM SERPL-MCNC: 7.1 MG/DL (ref 8.5–10.1)
CHLORIDE SERPL-SCNC: 103 MMOL/L (ref 99–107)
CHLORIDE SERPL-SCNC: 104 MMOL/L (ref 99–107)
CHLORIDE SERPL-SCNC: 105 MMOL/L (ref 99–107)
CHLORIDE SERPL-SCNC: 105 MMOL/L (ref 99–107)
CO2 SERPL-SCNC: 16.6 MMOL/L (ref 24–32)
CO2 SERPL-SCNC: 17.2 MMOL/L (ref 24–32)
CO2 SERPL-SCNC: 17.5 MMOL/L (ref 24–32)
CO2 SERPL-SCNC: 19.1 MMOL/L (ref 24–32)
CO2 SERPL-SCNC: 19.5 MMOL/L (ref 24–32)
CO2 SERPL-SCNC: 19.8 MMOL/L (ref 24–32)
COHGB MFR BLDA: 0.3 % (ref 0.5–1.5)
CREAT SERPL-MCNC: 3.03 MG/DL (ref 0.6–1.1)
CREAT SERPL-MCNC: 3.59 MG/DL (ref 0.6–1.1)
CREAT SERPL-MCNC: 3.7 MG/DL (ref 0.6–1.1)
CREAT SERPL-MCNC: 3.81 MG/DL (ref 0.6–1.1)
CREAT SERPL-MCNC: 4.04 MG/DL (ref 0.6–1.1)
CREAT SERPL-MCNC: 4.05 MG/DL (ref 0.6–1.1)
EOSINOPHIL # BLD AUTO: 0 X10'3 (ref 0–0.9)
EOSINOPHIL NFR BLD AUTO: 0 % (ref 0–6)
ERYTHROCYTE [DISTWIDTH] IN BLOOD BY AUTOMATED COUNT: 14.2 % (ref 11.5–14.5)
ERYTHROCYTE [DISTWIDTH] IN BLOOD BY AUTOMATED COUNT: 14.2 % (ref 11.5–14.5)
ERYTHROCYTE [DISTWIDTH] IN BLOOD BY AUTOMATED COUNT: 14.3 % (ref 11.5–14.5)
ERYTHROCYTE [DISTWIDTH] IN BLOOD BY AUTOMATED COUNT: 14.4 % (ref 11.5–14.5)
ERYTHROCYTE [DISTWIDTH] IN BLOOD BY AUTOMATED COUNT: 14.4 % (ref 11.5–14.5)
ERYTHROCYTE [DISTWIDTH] IN BLOOD BY AUTOMATED COUNT: 14.5 % (ref 11.5–14.5)
GFR SERPL CREATININE-BSD FRML MDRD: 15 ML/MIN
GFR SERPL CREATININE-BSD FRML MDRD: 15 ML/MIN
GFR SERPL CREATININE-BSD FRML MDRD: 16 ML/MIN
GFR SERPL CREATININE-BSD FRML MDRD: 16 ML/MIN
GFR SERPL CREATININE-BSD FRML MDRD: 17 ML/MIN
GFR SERPL CREATININE-BSD FRML MDRD: 20 ML/MIN
GLUCOSE SERPL-MCNC: 159 MG/DL (ref 70–104)
GLUCOSE SERPL-MCNC: 159 MG/DL (ref 70–104)
GLUCOSE SERPL-MCNC: 161 MG/DL (ref 70–104)
GLUCOSE SERPL-MCNC: 166 MG/DL (ref 70–104)
GLUCOSE SERPL-MCNC: 176 MG/DL (ref 70–104)
GLUCOSE SERPL-MCNC: 184 MG/DL (ref 70–104)
HCO3 BLDA-SCNC: 15.5 MMOL/L (ref 22–26)
HCT VFR BLD AUTO: 24 % (ref 42–52)
HCT VFR BLD AUTO: 24.8 % (ref 42–52)
HCT VFR BLD AUTO: 24.9 % (ref 42–52)
HCT VFR BLD AUTO: 25.1 % (ref 42–52)
HCT VFR BLD AUTO: 25.2 % (ref 42–52)
HCT VFR BLD AUTO: 26.6 % (ref 42–52)
HGB BLD-MCNC: 8.1 G/DL (ref 14–17.9)
HGB BLD-MCNC: 8.1 G/DL (ref 14–17.9)
HGB BLD-MCNC: 8.3 G/DL (ref 14–17.9)
HGB BLD-MCNC: 8.3 G/DL (ref 14–17.9)
HGB BLD-MCNC: 8.5 G/DL (ref 14–17.9)
HGB BLD-MCNC: 8.8 G/DL (ref 14–17.9)
HGB BLDA-MCNC: 9.6 G/DL (ref 14–17.9)
LYMPHOCYTES # BLD AUTO: 0.5 X10'3 (ref 1.1–4.8)
LYMPHOCYTES # BLD AUTO: 0.6 X10'3 (ref 1.1–4.8)
LYMPHOCYTES # BLD AUTO: 0.6 X10'3 (ref 1.1–4.8)
LYMPHOCYTES # BLD AUTO: 0.7 X10'3 (ref 1.1–4.8)
LYMPHOCYTES # BLD AUTO: 0.7 X10'3 (ref 1.1–4.8)
LYMPHOCYTES # BLD AUTO: 1.5 X10'3 (ref 1.1–4.8)
LYMPHOCYTES NFR BLD AUTO: 2.7 % (ref 21–51)
LYMPHOCYTES NFR BLD AUTO: 3.6 % (ref 21–51)
LYMPHOCYTES NFR BLD AUTO: 3.6 % (ref 21–51)
LYMPHOCYTES NFR BLD AUTO: 3.7 % (ref 21–51)
LYMPHOCYTES NFR BLD AUTO: 3.8 % (ref 21–51)
LYMPHOCYTES NFR BLD AUTO: 7 % (ref 21–51)
LYMPHOCYTES NFR BLD MANUAL: 2 % (ref 21–51)
MAGNESIUM SERPL-MCNC: 1.8 MG/DL (ref 1.5–2.4)
MAGNESIUM SERPL-MCNC: 1.8 MG/DL (ref 1.5–2.4)
MAGNESIUM SERPL-MCNC: 1.9 MG/DL (ref 1.5–2.4)
MAGNESIUM SERPL-MCNC: 2 MG/DL (ref 1.5–2.4)
MCH RBC QN AUTO: 30.9 PG (ref 27–31)
MCH RBC QN AUTO: 30.9 PG (ref 27–31)
MCH RBC QN AUTO: 31 PG (ref 27–31)
MCH RBC QN AUTO: 31.1 PG (ref 27–31)
MCH RBC QN AUTO: 31.2 PG (ref 27–31)
MCH RBC QN AUTO: 31.2 PG (ref 27–31)
MCHC RBC AUTO-ENTMCNC: 32.8 G/DL (ref 33–36.5)
MCHC RBC AUTO-ENTMCNC: 32.9 G/DL (ref 33–36.5)
MCHC RBC AUTO-ENTMCNC: 33 G/DL (ref 33–36.5)
MCHC RBC AUTO-ENTMCNC: 33.6 G/DL (ref 33–36.5)
MCHC RBC AUTO-ENTMCNC: 33.8 G/DL (ref 33–36.5)
MCHC RBC AUTO-ENTMCNC: 33.8 G/DL (ref 33–36.5)
MCV RBC AUTO: 91.9 FL (ref 78–98)
MCV RBC AUTO: 92.4 FL (ref 78–98)
MCV RBC AUTO: 92.8 FL (ref 78–98)
MCV RBC AUTO: 93.4 FL (ref 78–98)
MCV RBC AUTO: 94.2 FL (ref 78–98)
MCV RBC AUTO: 94.4 FL (ref 78–98)
METAMYELOCYTES NFR BLD MANUAL: 1 % (ref 0–0)
METHGB MFR BLDA: 0.1 % (ref 0.3–1.12)
MONOCYTES # BLD AUTO: 0.7 X10'3 (ref 0–0.9)
MONOCYTES # BLD AUTO: 0.9 X10'3 (ref 0–0.9)
MONOCYTES # BLD AUTO: 1 X10'3 (ref 0–0.9)
MONOCYTES # BLD AUTO: 1.2 X10'3 (ref 0–0.9)
MONOCYTES # BLD AUTO: 2 X10'3 (ref 0–0.9)
MONOCYTES # BLD AUTO: 2.9 X10'3 (ref 0–0.9)
MONOCYTES NFR BLD AUTO: 10.2 % (ref 2–12)
MONOCYTES NFR BLD AUTO: 13.4 % (ref 2–12)
MONOCYTES NFR BLD AUTO: 4.4 % (ref 2–12)
MONOCYTES NFR BLD AUTO: 5.5 % (ref 2–12)
MONOCYTES NFR BLD AUTO: 5.7 % (ref 2–12)
MONOCYTES NFR BLD AUTO: 6.3 % (ref 2–12)
MONOCYTES NFR BLD MANUAL: 8 % (ref 2–12)
NEUTROPHILS # BLD AUTO: 14.5 X10'3 (ref 1.8–7.7)
NEUTROPHILS # BLD AUTO: 15.5 X10'3 (ref 1.8–7.7)
NEUTROPHILS # BLD AUTO: 16.4 X10'3 (ref 1.8–7.7)
NEUTROPHILS # BLD AUTO: 16.5 X10'3 (ref 1.8–7.7)
NEUTROPHILS # BLD AUTO: 16.8 X10'3 (ref 1.8–7.7)
NEUTROPHILS # BLD AUTO: 17.1 X10'3 (ref 1.8–7.7)
NEUTROPHILS NFR BLD AUTO: 79.6 % (ref 42–75)
NEUTROPHILS NFR BLD AUTO: 86.1 % (ref 42–75)
NEUTROPHILS NFR BLD AUTO: 89.8 % (ref 42–75)
NEUTROPHILS NFR BLD AUTO: 90.9 % (ref 42–75)
NEUTROPHILS NFR BLD AUTO: 91.5 % (ref 42–75)
NEUTROPHILS NFR BLD AUTO: 91.8 % (ref 42–75)
NEUTS SEG NFR BLD MANUAL: 89 % (ref 42–75)
O2/TOTAL GAS SETTING VFR VENT: 30 MMHG/%
OXYHGB MFR BLDA: 94.8 % (ref 94–100)
PCO2 TEMP ADJ BLDA: 29.6 MMHG (ref 35–45)
PEEP RESPIRATORY: 8 CM H2O
PH TEMP ADJ BLDA: 7.33 [PH] (ref 7.35–7.45)
PHOSPHATE SERPL-MCNC: 3.4 MG/DL (ref 2.3–4.5)
PHOSPHATE SERPL-MCNC: 4.2 MG/DL (ref 2.3–4.5)
PHOSPHATE SERPL-MCNC: 4.4 MG/DL (ref 2.3–4.5)
PHOSPHATE SERPL-MCNC: 4.4 MG/DL (ref 2.3–4.5)
PHOSPHATE SERPL-MCNC: 4.7 MG/DL (ref 2.3–4.5)
PHOSPHATE SERPL-MCNC: 4.9 MG/DL (ref 2.3–4.5)
PLATELET # BLD AUTO: 165 X10'3 (ref 140–440)
PLATELET # BLD AUTO: 178 X10'3 (ref 140–440)
PLATELET # BLD AUTO: 185 X10'3 (ref 140–440)
PLATELET # BLD AUTO: 186 X10'3 (ref 140–440)
PLATELET # BLD AUTO: 192 X10'3 (ref 140–440)
PLATELET # BLD AUTO: 218 X10'3 (ref 140–440)
PLATELET BLD QL SMEAR: NORMAL
PMV BLD AUTO: 7.5 FL (ref 7.4–10.4)
PMV BLD AUTO: 7.6 FL (ref 7.4–10.4)
PMV BLD AUTO: 7.8 FL (ref 7.4–10.4)
PO2 TEMP ADJ BLD: 76.9 MMHG (ref 83–108)
POLYCHROMASIA BLD QL SMEAR: (no result)
POTASSIUM SERPL-SCNC: 4.2 MMOL/L (ref 3.5–5.1)
POTASSIUM SERPL-SCNC: 4.2 MMOL/L (ref 3.5–5.1)
POTASSIUM SERPL-SCNC: 4.4 MMOL/L (ref 3.5–5.1)
POTASSIUM SERPL-SCNC: 4.5 MMOL/L (ref 3.5–5.1)
POTASSIUM SERPL-SCNC: 4.5 MMOL/L (ref 3.5–5.1)
POTASSIUM SERPL-SCNC: 4.7 MMOL/L (ref 3.5–5.1)
PROT SERPL-MCNC: 5.9 G/DL (ref 6.4–8.2)
RBC # BLD AUTO: 2.6 X10'6 (ref 4.7–6.1)
RBC # BLD AUTO: 2.63 X10'6 (ref 4.7–6.1)
RBC # BLD AUTO: 2.67 X10'6 (ref 4.7–6.1)
RBC # BLD AUTO: 2.68 X10'6 (ref 4.7–6.1)
RBC # BLD AUTO: 2.73 X10'6 (ref 4.7–6.1)
RBC # BLD AUTO: 2.85 X10'6 (ref 4.7–6.1)
RBC MORPH BLD: (no result)
RESP RATE 1H: 18 B/MIN
RESP RATE RESTING: 18 B/MIN
SAO2 % BLDA: 95.2 % (ref 95–98)
SODIUM SERPL-SCNC: 134 MMOL/L (ref 135–145)
SODIUM SERPL-SCNC: 135 MMOL/L (ref 135–145)
SPONT VT COMPRES GAS VOL SET UNCOR VENT: 500 ML
TOTAL CELLS COUNTED FLD: 100
VOL.EXP/M/BW ON VENT: 10 L/MIN
WBC # BLD AUTO: 15.7 X10'3 (ref 4.5–11)
WBC # BLD AUTO: 17.1 X10'3 (ref 4.5–11)
WBC # BLD AUTO: 18.1 X10'3 (ref 4.5–11)
WBC # BLD AUTO: 18.3 X10'3 (ref 4.5–11)
WBC # BLD AUTO: 19.5 X10'3 (ref 4.5–11)
WBC # BLD AUTO: 21.5 X10'3 (ref 4.5–11)

## 2019-12-07 PROCEDURE — 06HY33Z INSERTION OF INFUSION DEVICE INTO LOWER VEIN, PERCUTANEOUS APPROACH: ICD-10-PCS | Performed by: INTERNAL MEDICINE

## 2019-12-07 PROCEDURE — 5A1D90Z PERFORMANCE OF URINARY FILTRATION, CONTINUOUS, GREATER THAN 18 HOURS PER DAY: ICD-10-PCS | Performed by: INTERNAL MEDICINE

## 2019-12-07 RX ADMIN — DOCUSATE SODIUM SCH MG: 50 LIQUID ORAL at 20:36

## 2019-12-07 RX ADMIN — IPRATROPIUM BROMIDE AND ALBUTEROL SULFATE SCH ML: .5; 3 SOLUTION RESPIRATORY (INHALATION) at 18:38

## 2019-12-07 RX ADMIN — Medication SCH MMU: at 20:36

## 2019-12-07 RX ADMIN — INSULIN HUMAN SCH MLS/HR: 100 INJECTION, SOLUTION PARENTERAL at 17:06

## 2019-12-07 RX ADMIN — Medication SCH MMU: at 08:34

## 2019-12-07 RX ADMIN — DOBUTAMINE IN DEXTROSE SCH MLS/HR: 200 INJECTION, SOLUTION INTRAVENOUS at 16:37

## 2019-12-07 RX ADMIN — Medication SCH MLS/HR: at 22:47

## 2019-12-07 RX ADMIN — Medication SCH MLS/HR: at 17:46

## 2019-12-07 RX ADMIN — IPRATROPIUM BROMIDE AND ALBUTEROL SULFATE SCH ML: .5; 3 SOLUTION RESPIRATORY (INHALATION) at 14:15

## 2019-12-07 RX ADMIN — DOBUTAMINE IN DEXTROSE SCH MLS/HR: 200 INJECTION, SOLUTION INTRAVENOUS at 08:19

## 2019-12-07 RX ADMIN — MINERAL OIL, PETROLATUM SCH INCH: 425; 568 OINTMENT OPHTHALMIC at 16:14

## 2019-12-07 RX ADMIN — Medication SCH MLS/HR: at 12:00

## 2019-12-07 RX ADMIN — Medication SCH MLS/HR: at 16:15

## 2019-12-07 RX ADMIN — SODIUM CHLORIDE SCH MLS/HR: 9 INJECTION INTRAMUSCULAR; INTRAVENOUS; SUBCUTANEOUS at 16:37

## 2019-12-07 RX ADMIN — METOCLOPRAMIDE SCH MG: 5 INJECTION, SOLUTION INTRAMUSCULAR; INTRAVENOUS at 16:14

## 2019-12-07 RX ADMIN — IPRATROPIUM BROMIDE AND ALBUTEROL SULFATE SCH ML: .5; 3 SOLUTION RESPIRATORY (INHALATION) at 10:31

## 2019-12-07 RX ADMIN — CEFEPIME HYDROCHLORIDE SCH MLS/HR: 1 INJECTION, SOLUTION INTRAVENOUS at 08:33

## 2019-12-07 RX ADMIN — IPRATROPIUM BROMIDE AND ALBUTEROL SULFATE SCH ML: .5; 3 SOLUTION RESPIRATORY (INHALATION) at 06:58

## 2019-12-07 RX ADMIN — IPRATROPIUM BROMIDE AND ALBUTEROL SULFATE SCH ML: .5; 3 SOLUTION RESPIRATORY (INHALATION) at 02:37

## 2019-12-07 RX ADMIN — DOCUSATE SODIUM SCH MG: 50 LIQUID ORAL at 08:34

## 2019-12-07 RX ADMIN — Medication SCH MLS/HR: at 12:01

## 2019-12-07 RX ADMIN — Medication SCH MLS/HR: at 15:00

## 2019-12-07 RX ADMIN — METOCLOPRAMIDE SCH MG: 5 INJECTION, SOLUTION INTRAMUSCULAR; INTRAVENOUS at 08:33

## 2019-12-07 RX ADMIN — VANCOMYCIN HYDROCHLORIDE SCH MLS/HR: 750 INJECTION, POWDER, LYOPHILIZED, FOR SOLUTION INTRAVENOUS at 12:26

## 2019-12-07 RX ADMIN — METOCLOPRAMIDE SCH MG: 5 INJECTION, SOLUTION INTRAMUSCULAR; INTRAVENOUS at 02:32

## 2019-12-07 RX ADMIN — Medication PRN MLS/HR: at 00:20

## 2019-12-07 RX ADMIN — IPRATROPIUM BROMIDE AND ALBUTEROL SULFATE SCH ML: .5; 3 SOLUTION RESPIRATORY (INHALATION) at 22:27

## 2019-12-07 RX ADMIN — METHYLPREDNISOLONE SODIUM SUCCINATE SCH MG: 40 INJECTION, POWDER, FOR SOLUTION INTRAMUSCULAR; INTRAVENOUS at 08:34

## 2019-12-07 RX ADMIN — METHYLPREDNISOLONE SODIUM SUCCINATE SCH MG: 40 INJECTION, POWDER, FOR SOLUTION INTRAMUSCULAR; INTRAVENOUS at 16:14

## 2019-12-07 RX ADMIN — MINERAL OIL, PETROLATUM SCH INCH: 425; 568 OINTMENT OPHTHALMIC at 08:33

## 2019-12-07 RX ADMIN — Medication SCH MLS/HR: at 12:26

## 2019-12-07 RX ADMIN — Medication SCH MLS/HR: at 17:48

## 2019-12-07 RX ADMIN — MINERAL OIL, PETROLATUM SCH INCH: 425; 568 OINTMENT OPHTHALMIC at 20:36

## 2019-12-07 RX ADMIN — Medication SCH MLS/HR: at 22:48

## 2019-12-07 RX ADMIN — Medication PRN MLS/HR: at 03:00

## 2019-12-07 RX ADMIN — MINERAL OIL, PETROLATUM SCH INCH: 425; 568 OINTMENT OPHTHALMIC at 02:32

## 2019-12-07 RX ADMIN — HEPARIN SODIUM AND DEXTROSE SCH MLS/HR: 10000; 5 INJECTION INTRAVENOUS at 10:06

## 2019-12-07 RX ADMIN — Medication SCH MLS/HR: at 17:47

## 2019-12-07 RX ADMIN — METOCLOPRAMIDE SCH MG: 5 INJECTION, SOLUTION INTRAMUSCULAR; INTRAVENOUS at 20:36

## 2019-12-07 RX ADMIN — METHYLPREDNISOLONE SODIUM SUCCINATE SCH MG: 40 INJECTION, POWDER, FOR SOLUTION INTRAMUSCULAR; INTRAVENOUS at 02:32

## 2019-12-07 RX ADMIN — METHYLPREDNISOLONE SODIUM SUCCINATE SCH MG: 40 INJECTION, POWDER, FOR SOLUTION INTRAMUSCULAR; INTRAVENOUS at 20:36

## 2019-12-07 RX ADMIN — Medication SCH MLS/HR: at 20:35

## 2019-12-07 NOTE — NUR
Pt very restless during sedation vacation. Trying to sit up, moving arms and legs, pulling 
against restraints. Pt opens eyes spontaneously or to voice but will not follow commands. 
During one of multiple times reorienting pt, trying to get him to follow commands, I told 
him he was in the hospital and he mouthed, "I know" but continued to thrash about. Sedation 
boluses had to be given and rate titrated as needed to keep CVVH running and maranda site 
from bleeding.

Dentures were removed at beginning of shift and pt was noted to have a bloody blister on the 
Left inside cheek. Blister remains intact. Copious oral secretions, pt does not like oral 
care, cuff leak noted when pt moving trying to avoid oral suctioning.

## 2019-12-07 NOTE — NUR
Patient in room ICU 2039. I have received report from Andie GONZALEZ and had the opportunity to 
ask questions and assume patient care.

## 2019-12-08 VITALS — DIASTOLIC BLOOD PRESSURE: 43 MMHG | SYSTOLIC BLOOD PRESSURE: 127 MMHG

## 2019-12-08 VITALS — SYSTOLIC BLOOD PRESSURE: 128 MMHG | DIASTOLIC BLOOD PRESSURE: 44 MMHG

## 2019-12-08 VITALS — SYSTOLIC BLOOD PRESSURE: 104 MMHG | DIASTOLIC BLOOD PRESSURE: 41 MMHG

## 2019-12-08 VITALS — DIASTOLIC BLOOD PRESSURE: 43 MMHG | SYSTOLIC BLOOD PRESSURE: 107 MMHG

## 2019-12-08 VITALS — DIASTOLIC BLOOD PRESSURE: 39 MMHG | SYSTOLIC BLOOD PRESSURE: 125 MMHG

## 2019-12-08 VITALS — DIASTOLIC BLOOD PRESSURE: 36 MMHG | SYSTOLIC BLOOD PRESSURE: 124 MMHG

## 2019-12-08 VITALS — SYSTOLIC BLOOD PRESSURE: 113 MMHG | DIASTOLIC BLOOD PRESSURE: 46 MMHG

## 2019-12-08 VITALS — SYSTOLIC BLOOD PRESSURE: 131 MMHG | DIASTOLIC BLOOD PRESSURE: 47 MMHG

## 2019-12-08 VITALS — SYSTOLIC BLOOD PRESSURE: 119 MMHG | DIASTOLIC BLOOD PRESSURE: 39 MMHG

## 2019-12-08 VITALS — DIASTOLIC BLOOD PRESSURE: 39 MMHG | SYSTOLIC BLOOD PRESSURE: 106 MMHG

## 2019-12-08 VITALS — DIASTOLIC BLOOD PRESSURE: 42 MMHG | SYSTOLIC BLOOD PRESSURE: 119 MMHG

## 2019-12-08 VITALS — DIASTOLIC BLOOD PRESSURE: 29 MMHG | SYSTOLIC BLOOD PRESSURE: 107 MMHG

## 2019-12-08 VITALS — DIASTOLIC BLOOD PRESSURE: 44 MMHG | SYSTOLIC BLOOD PRESSURE: 111 MMHG

## 2019-12-08 VITALS — SYSTOLIC BLOOD PRESSURE: 114 MMHG | DIASTOLIC BLOOD PRESSURE: 36 MMHG

## 2019-12-08 VITALS — DIASTOLIC BLOOD PRESSURE: 33 MMHG | SYSTOLIC BLOOD PRESSURE: 126 MMHG

## 2019-12-08 VITALS — SYSTOLIC BLOOD PRESSURE: 127 MMHG | DIASTOLIC BLOOD PRESSURE: 49 MMHG

## 2019-12-08 VITALS — DIASTOLIC BLOOD PRESSURE: 30 MMHG | SYSTOLIC BLOOD PRESSURE: 98 MMHG

## 2019-12-08 VITALS — SYSTOLIC BLOOD PRESSURE: 105 MMHG | DIASTOLIC BLOOD PRESSURE: 49 MMHG

## 2019-12-08 VITALS — SYSTOLIC BLOOD PRESSURE: 117 MMHG | DIASTOLIC BLOOD PRESSURE: 33 MMHG

## 2019-12-08 VITALS — SYSTOLIC BLOOD PRESSURE: 106 MMHG | DIASTOLIC BLOOD PRESSURE: 47 MMHG

## 2019-12-08 VITALS — SYSTOLIC BLOOD PRESSURE: 119 MMHG | DIASTOLIC BLOOD PRESSURE: 36 MMHG

## 2019-12-08 VITALS — SYSTOLIC BLOOD PRESSURE: 107 MMHG | DIASTOLIC BLOOD PRESSURE: 44 MMHG

## 2019-12-08 VITALS — DIASTOLIC BLOOD PRESSURE: 49 MMHG | SYSTOLIC BLOOD PRESSURE: 114 MMHG

## 2019-12-08 LAB
ALBUMIN SERPL BCP-MCNC: 2.1 G/DL (ref 3.4–5)
ALBUMIN SERPL BCP-MCNC: 2.1 G/DL (ref 3.4–5)
ALBUMIN SERPL BCP-MCNC: 2.2 G/DL (ref 3.4–5)
ALBUMIN SERPL BCP-MCNC: 2.3 G/DL (ref 3.4–5)
ANION GAP SERPL CALCULATED.3IONS-SCNC: 10 MMOL/L (ref 8–16)
ANION GAP SERPL CALCULATED.3IONS-SCNC: 5 MMOL/L (ref 8–16)
ANION GAP SERPL CALCULATED.3IONS-SCNC: 7 MMOL/L (ref 8–16)
ANION GAP SERPL CALCULATED.3IONS-SCNC: 9 MMOL/L (ref 8–16)
ANISOCYTOSIS BLD QL SMEAR: (no result)
BASE EXCESS BLDA CALC-SCNC: -1.4 MMOL/L (ref -2–3)
BASOPHILS # BLD AUTO: 0 X10'3 (ref 0–0.2)
BASOPHILS NFR BLD AUTO: 0.1 % (ref 0–1)
BODY TEMPERATURE: 36.4
BUN SERPL-MCNC: 34 MG/DL (ref 7–18)
BUN SERPL-MCNC: 35 MG/DL (ref 7–18)
BUN SERPL-MCNC: 36 MG/DL (ref 7–18)
BUN SERPL-MCNC: 40 MG/DL (ref 7–18)
BUN/CREAT SERPL: 15.4 (ref 5.4–32)
BUN/CREAT SERPL: 16 (ref 5.4–32)
BUN/CREAT SERPL: 16.5 (ref 5.4–32)
BUN/CREAT SERPL: 17.8 (ref 5.4–32)
CHLORIDE SERPL-SCNC: 102 MMOL/L (ref 99–107)
CHLORIDE SERPL-SCNC: 103 MMOL/L (ref 99–107)
CHLORIDE SERPL-SCNC: 104 MMOL/L (ref 99–107)
CHLORIDE SERPL-SCNC: 104 MMOL/L (ref 99–107)
CO2 SERPL-SCNC: 23.2 MMOL/L (ref 24–32)
CO2 SERPL-SCNC: 25.3 MMOL/L (ref 24–32)
CO2 SERPL-SCNC: 27.5 MMOL/L (ref 24–32)
CO2 SERPL-SCNC: 27.7 MMOL/L (ref 24–32)
COHGB MFR BLDA: 0.2 % (ref 0.5–1.5)
CREAT SERPL-MCNC: 1.97 MG/DL (ref 0.6–1.1)
CREAT SERPL-MCNC: 2.06 MG/DL (ref 0.6–1.1)
CREAT SERPL-MCNC: 2.25 MG/DL (ref 0.6–1.1)
CREAT SERPL-MCNC: 2.6 MG/DL (ref 0.6–1.1)
EOSINOPHIL # BLD AUTO: 0 X10'3 (ref 0–0.9)
EOSINOPHIL NFR BLD AUTO: 0 % (ref 0–6)
ERYTHROCYTE [DISTWIDTH] IN BLOOD BY AUTOMATED COUNT: 14.2 % (ref 11.5–14.5)
ERYTHROCYTE [DISTWIDTH] IN BLOOD BY AUTOMATED COUNT: 14.2 % (ref 11.5–14.5)
ERYTHROCYTE [DISTWIDTH] IN BLOOD BY AUTOMATED COUNT: 14.3 % (ref 11.5–14.5)
ERYTHROCYTE [DISTWIDTH] IN BLOOD BY AUTOMATED COUNT: 14.5 % (ref 11.5–14.5)
GFR SERPL CREATININE-BSD FRML MDRD: 24 ML/MIN
GFR SERPL CREATININE-BSD FRML MDRD: 29 ML/MIN
GFR SERPL CREATININE-BSD FRML MDRD: 32 ML/MIN
GFR SERPL CREATININE-BSD FRML MDRD: 33 ML/MIN
GLUCOSE SERPL-MCNC: 123 MG/DL (ref 70–104)
GLUCOSE SERPL-MCNC: 138 MG/DL (ref 70–104)
GLUCOSE SERPL-MCNC: 153 MG/DL (ref 70–104)
GLUCOSE SERPL-MCNC: 226 MG/DL (ref 70–104)
HCO3 BLDA-SCNC: 22 MMOL/L (ref 22–26)
HCT VFR BLD AUTO: 25.5 % (ref 42–52)
HCT VFR BLD AUTO: 26 % (ref 42–52)
HCT VFR BLD AUTO: 26.2 % (ref 42–52)
HCT VFR BLD AUTO: 26.7 % (ref 42–52)
HGB BLD-MCNC: 8.6 G/DL (ref 14–17.9)
HGB BLD-MCNC: 8.7 G/DL (ref 14–17.9)
HGB BLD-MCNC: 8.8 G/DL (ref 14–17.9)
HGB BLD-MCNC: 8.9 G/DL (ref 14–17.9)
HGB BLDA-MCNC: 9.8 G/DL (ref 14–17.9)
LYMPHOCYTES # BLD AUTO: 0.7 X10'3 (ref 1.1–4.8)
LYMPHOCYTES # BLD AUTO: 0.8 X10'3 (ref 1.1–4.8)
LYMPHOCYTES # BLD AUTO: 1.1 X10'3 (ref 1.1–4.8)
LYMPHOCYTES # BLD AUTO: 1.7 X10'3 (ref 1.1–4.8)
LYMPHOCYTES NFR BLD AUTO: 4.1 % (ref 21–51)
LYMPHOCYTES NFR BLD AUTO: 4.6 % (ref 21–51)
LYMPHOCYTES NFR BLD AUTO: 5.5 % (ref 21–51)
LYMPHOCYTES NFR BLD AUTO: 8.4 % (ref 21–51)
LYMPHOCYTES NFR BLD MANUAL: 4 % (ref 21–51)
MAGNESIUM SERPL-MCNC: 1.7 MG/DL (ref 1.5–2.4)
MAGNESIUM SERPL-MCNC: 2.1 MG/DL (ref 1.5–2.4)
MAGNESIUM SERPL-MCNC: 2.2 MG/DL (ref 1.5–2.4)
MAGNESIUM SERPL-MCNC: 2.4 MG/DL (ref 1.5–2.4)
MCH RBC QN AUTO: 31.1 PG (ref 27–31)
MCH RBC QN AUTO: 31.1 PG (ref 27–31)
MCH RBC QN AUTO: 31.3 PG (ref 27–31)
MCH RBC QN AUTO: 31.4 PG (ref 27–31)
MCHC RBC AUTO-ENTMCNC: 33.4 G/DL (ref 33–36.5)
MCHC RBC AUTO-ENTMCNC: 33.5 G/DL (ref 33–36.5)
MCHC RBC AUTO-ENTMCNC: 33.6 G/DL (ref 33–36.5)
MCHC RBC AUTO-ENTMCNC: 33.7 G/DL (ref 33–36.5)
MCV RBC AUTO: 92.2 FL (ref 78–98)
MCV RBC AUTO: 93 FL (ref 78–98)
MCV RBC AUTO: 93.4 FL (ref 78–98)
MCV RBC AUTO: 93.6 FL (ref 78–98)
METAMYELOCYTES NFR BLD MANUAL: 1 % (ref 0–0)
METHGB MFR BLDA: 0.3 % (ref 0.3–1.12)
MONOCYTES # BLD AUTO: 1.5 X10'3 (ref 0–0.9)
MONOCYTES # BLD AUTO: 2 X10'3 (ref 0–0.9)
MONOCYTES # BLD AUTO: 2.6 X10'3 (ref 0–0.9)
MONOCYTES # BLD AUTO: 2.7 X10'3 (ref 0–0.9)
MONOCYTES NFR BLD AUTO: 11 % (ref 2–12)
MONOCYTES NFR BLD AUTO: 13.5 % (ref 2–12)
MONOCYTES NFR BLD AUTO: 13.7 % (ref 2–12)
MONOCYTES NFR BLD AUTO: 8.3 % (ref 2–12)
MONOCYTES NFR BLD MANUAL: 6 % (ref 2–12)
NEUTROPHILS # BLD AUTO: 15.3 X10'3 (ref 1.8–7.7)
NEUTROPHILS # BLD AUTO: 15.4 X10'3 (ref 1.8–7.7)
NEUTROPHILS # BLD AUTO: 15.5 X10'3 (ref 1.8–7.7)
NEUTROPHILS # BLD AUTO: 15.6 X10'3 (ref 1.8–7.7)
NEUTROPHILS NFR BLD AUTO: 78 % (ref 42–75)
NEUTROPHILS NFR BLD AUTO: 80.7 % (ref 42–75)
NEUTROPHILS NFR BLD AUTO: 84.3 % (ref 42–75)
NEUTROPHILS NFR BLD AUTO: 87.5 % (ref 42–75)
NEUTS BAND # BLD MANUAL: 2 % (ref 0–10)
NEUTS SEG NFR BLD MANUAL: 86 % (ref 42–75)
NRBC BLD MANUAL-RTO: 2 /100WBC (ref 0–0)
O2/TOTAL GAS SETTING VFR VENT: 30 MMHG/%
OXYHGB MFR BLDA: 96.3 % (ref 94–100)
PCO2 TEMP ADJ BLDA: 31.3 MMHG (ref 35–45)
PEEP RESPIRATORY: 8 CM H2O
PH TEMP ADJ BLDA: 7.46 [PH] (ref 7.35–7.45)
PHOSPHATE SERPL-MCNC: 2.5 MG/DL (ref 2.3–4.5)
PHOSPHATE SERPL-MCNC: 2.6 MG/DL (ref 2.3–4.5)
PHOSPHATE SERPL-MCNC: 2.9 MG/DL (ref 2.3–4.5)
PHOSPHATE SERPL-MCNC: 2.9 MG/DL (ref 2.3–4.5)
PLATELET # BLD AUTO: 209 X10'3 (ref 140–440)
PLATELET # BLD AUTO: 210 X10'3 (ref 140–440)
PLATELET # BLD AUTO: 220 X10'3 (ref 140–440)
PLATELET # BLD AUTO: 225 X10'3 (ref 140–440)
PLATELET BLD QL SMEAR: NORMAL
PMV BLD AUTO: 7.5 FL (ref 7.4–10.4)
PMV BLD AUTO: 7.7 FL (ref 7.4–10.4)
PMV BLD AUTO: 8 FL (ref 7.4–10.4)
PMV BLD AUTO: 8 FL (ref 7.4–10.4)
PO2 TEMP ADJ BLD: 86.7 MMHG (ref 83–108)
POLYCHROMASIA BLD QL SMEAR: (no result)
POTASSIUM SERPL-SCNC: 4 MMOL/L (ref 3.5–5.1)
POTASSIUM SERPL-SCNC: 4.4 MMOL/L (ref 3.5–5.1)
POTASSIUM SERPL-SCNC: 4.6 MMOL/L (ref 3.5–5.1)
POTASSIUM SERPL-SCNC: 4.6 MMOL/L (ref 3.5–5.1)
RBC # BLD AUTO: 2.73 X10'6 (ref 4.7–6.1)
RBC # BLD AUTO: 2.79 X10'6 (ref 4.7–6.1)
RBC # BLD AUTO: 2.84 X10'6 (ref 4.7–6.1)
RBC # BLD AUTO: 2.86 X10'6 (ref 4.7–6.1)
RBC MORPH BLD: (no result)
RESP RATE 1H: 18 B/MIN
RESP RATE RESTING: 18 B/MIN
SAO2 % BLDA: 96.8 % (ref 95–98)
SODIUM SERPL-SCNC: 136 MMOL/L (ref 135–145)
SODIUM SERPL-SCNC: 136 MMOL/L (ref 135–145)
SODIUM SERPL-SCNC: 137 MMOL/L (ref 135–145)
SODIUM SERPL-SCNC: 138 MMOL/L (ref 135–145)
SPONT VT COMPRES GAS VOL SET UNCOR VENT: 500 ML
TOTAL CELLS COUNTED FLD: 100
VARIANT LYMPHS NFR BLD MANUAL: 1 % (ref 0–0)
VOL.EXP/M/BW ON VENT: 10 L/MIN
WBC # BLD AUTO: 17.6 X10'3 (ref 4.5–11)
WBC # BLD AUTO: 18.1 X10'3 (ref 4.5–11)
WBC # BLD AUTO: 19.2 X10'3 (ref 4.5–11)
WBC # BLD AUTO: 20 X10'3 (ref 4.5–11)

## 2019-12-08 PROCEDURE — 5A1D90Z PERFORMANCE OF URINARY FILTRATION, CONTINUOUS, GREATER THAN 18 HOURS PER DAY: ICD-10-PCS | Performed by: INTERNAL MEDICINE

## 2019-12-08 RX ADMIN — DOCUSATE SODIUM SCH MG: 50 LIQUID ORAL at 07:28

## 2019-12-08 RX ADMIN — VANCOMYCIN HYDROCHLORIDE SCH MLS/HR: 750 INJECTION, POWDER, LYOPHILIZED, FOR SOLUTION INTRAVENOUS at 12:11

## 2019-12-08 RX ADMIN — Medication SCH MLS/HR: at 10:17

## 2019-12-08 RX ADMIN — IPRATROPIUM BROMIDE AND ALBUTEROL SULFATE SCH ML: .5; 3 SOLUTION RESPIRATORY (INHALATION) at 02:37

## 2019-12-08 RX ADMIN — CEFEPIME HYDROCHLORIDE SCH MLS/HR: 1 INJECTION, SOLUTION INTRAVENOUS at 08:09

## 2019-12-08 RX ADMIN — METOCLOPRAMIDE SCH MG: 5 INJECTION, SOLUTION INTRAMUSCULAR; INTRAVENOUS at 07:31

## 2019-12-08 RX ADMIN — METHYLPREDNISOLONE SODIUM SUCCINATE SCH MG: 40 INJECTION, POWDER, FOR SOLUTION INTRAMUSCULAR; INTRAVENOUS at 07:30

## 2019-12-08 RX ADMIN — Medication SCH MLS/HR: at 04:50

## 2019-12-08 RX ADMIN — METOCLOPRAMIDE SCH MG: 5 INJECTION, SOLUTION INTRAMUSCULAR; INTRAVENOUS at 21:13

## 2019-12-08 RX ADMIN — MINERAL OIL, PETROLATUM SCH INCH: 425; 568 OINTMENT OPHTHALMIC at 07:36

## 2019-12-08 RX ADMIN — DOCUSATE SODIUM SCH MG: 50 LIQUID ORAL at 21:14

## 2019-12-08 RX ADMIN — Medication SCH MLS/HR: at 23:05

## 2019-12-08 RX ADMIN — MINERAL OIL, PETROLATUM SCH INCH: 425; 568 OINTMENT OPHTHALMIC at 14:23

## 2019-12-08 RX ADMIN — DOBUTAMINE IN DEXTROSE SCH MLS/HR: 200 INJECTION, SOLUTION INTRAVENOUS at 02:13

## 2019-12-08 RX ADMIN — Medication SCH MLS/HR: at 11:15

## 2019-12-08 RX ADMIN — DOBUTAMINE IN DEXTROSE SCH MLS/HR: 200 INJECTION, SOLUTION INTRAVENOUS at 21:13

## 2019-12-08 RX ADMIN — IPRATROPIUM BROMIDE AND ALBUTEROL SULFATE SCH ML: .5; 3 SOLUTION RESPIRATORY (INHALATION) at 07:00

## 2019-12-08 RX ADMIN — Medication SCH MMU: at 07:28

## 2019-12-08 RX ADMIN — MINERAL OIL, PETROLATUM SCH INCH: 425; 568 OINTMENT OPHTHALMIC at 21:13

## 2019-12-08 RX ADMIN — IPRATROPIUM BROMIDE AND ALBUTEROL SULFATE SCH ML: .5; 3 SOLUTION RESPIRATORY (INHALATION) at 23:00

## 2019-12-08 RX ADMIN — IPRATROPIUM BROMIDE AND ALBUTEROL SULFATE SCH ML: .5; 3 SOLUTION RESPIRATORY (INHALATION) at 14:03

## 2019-12-08 RX ADMIN — Medication SCH MLS/HR: at 23:00

## 2019-12-08 RX ADMIN — METHYLPREDNISOLONE SODIUM SUCCINATE SCH MG: 40 INJECTION, POWDER, FOR SOLUTION INTRAMUSCULAR; INTRAVENOUS at 02:18

## 2019-12-08 RX ADMIN — Medication SCH MLS/HR: at 16:41

## 2019-12-08 RX ADMIN — Medication SCH MLS/HR: at 16:43

## 2019-12-08 RX ADMIN — Medication SCH MLS/HR: at 04:49

## 2019-12-08 RX ADMIN — INSULIN HUMAN SCH MLS/HR: 100 INJECTION, SOLUTION PARENTERAL at 04:15

## 2019-12-08 RX ADMIN — Medication SCH MLS/HR: at 16:42

## 2019-12-08 RX ADMIN — IPRATROPIUM BROMIDE AND ALBUTEROL SULFATE SCH ML: .5; 3 SOLUTION RESPIRATORY (INHALATION) at 18:37

## 2019-12-08 RX ADMIN — IPRATROPIUM BROMIDE AND ALBUTEROL SULFATE SCH ML: .5; 3 SOLUTION RESPIRATORY (INHALATION) at 10:14

## 2019-12-08 RX ADMIN — METOCLOPRAMIDE SCH MG: 5 INJECTION, SOLUTION INTRAMUSCULAR; INTRAVENOUS at 14:23

## 2019-12-08 RX ADMIN — Medication PRN MLS/HR: at 02:19

## 2019-12-08 RX ADMIN — MINERAL OIL, PETROLATUM SCH INCH: 425; 568 OINTMENT OPHTHALMIC at 02:18

## 2019-12-08 RX ADMIN — Medication SCH MLS/HR: at 23:08

## 2019-12-08 RX ADMIN — METHYLPREDNISOLONE SODIUM SUCCINATE SCH MG: 40 INJECTION, POWDER, FOR SOLUTION INTRAMUSCULAR; INTRAVENOUS at 21:14

## 2019-12-08 RX ADMIN — Medication SCH MLS/HR: at 23:01

## 2019-12-08 RX ADMIN — HEPARIN SODIUM AND DEXTROSE SCH MLS/HR: 10000; 5 INJECTION INTRAVENOUS at 16:37

## 2019-12-08 RX ADMIN — Medication SCH MMU: at 21:14

## 2019-12-08 RX ADMIN — METHYLNALTREXONE BROMIDE SCH MG: 12 INJECTION, SOLUTION SUBCUTANEOUS at 07:36

## 2019-12-08 RX ADMIN — METOCLOPRAMIDE SCH MG: 5 INJECTION, SOLUTION INTRAMUSCULAR; INTRAVENOUS at 02:18

## 2019-12-08 RX ADMIN — Medication SCH MLS/HR: at 21:17

## 2019-12-08 RX ADMIN — METHYLPREDNISOLONE SODIUM SUCCINATE SCH MG: 40 INJECTION, POWDER, FOR SOLUTION INTRAMUSCULAR; INTRAVENOUS at 14:23

## 2019-12-08 RX ADMIN — Medication SCH MLS/HR: at 11:16

## 2019-12-08 RX ADMIN — INSULIN HUMAN SCH MLS/HR: 100 INJECTION, SOLUTION PARENTERAL at 15:20

## 2019-12-08 RX ADMIN — Medication SCH MLS/HR: at 04:48

## 2019-12-08 NOTE — NUR
I have received report and assumed care of pt. Pt resting in bed rise and fall of chest 
cavity equile and symmetrical, pt opens eyes to verbal stimuli, grimaces with oral care and 
shakes head from side to side with suctioning. CVVH in place assessments complete. pt does 
not appear to follow commands. He does move all extremities with tactical stimuli,

## 2019-12-08 NOTE — NUR
Problems reprioritized. Patient report given, questions answered & plan of care reviewed 
with Nicolas GONZALEZ.

## 2019-12-08 NOTE — NUR
Problems reprioritized. Patient report given, questions answered & plan of care reviewed 
with Gloria GONZALEZ.

## 2019-12-08 NOTE — NUR
Patient in room ICU 2039. I have received report from Sarah GONZALEZ and had the opportunity to 
ask questions and assume patient care.

## 2019-12-08 NOTE — NUR
hs cares complete pt tolerated well, linen changed, pt has a reddened area on tailbone area, 
area does sirena hydrophilic dressing applied will turn pt side to side to maintain skin 
integrity.

## 2019-12-08 NOTE — NUR
Reassessment: Pt tolerating TF at goal. No BM yet this admit 6 days receiving routine 
relistor, reglan, and colace. Will monitor for more aggressive bowel care needs IF 
constipation persists. Will continue to monitor.

Recommend:

1. OGTF using Vital High Protein at 75 ml/hr to provide total volume of

1800 ml, 1800 cals, 158g protein and 1512ml water. Initiate at 20ml/hr and

advance 20ml Q8 to goal as tolerated.

2. prealbumin q Monday and Thursday, daily weights

3. additional water flush per intensivist; Na 135

4. monitor for TF tolerance

5. When extubated and alert needs written and verbal DM education, A1c is

11.6

-------------------------------------------------------------------------------

Addendum: 12/08/19 at 1106 by Mal Greene RD

-------------------------------------------------------------------------------

Amended: Links added.

## 2019-12-08 NOTE — NUR
CVVH positional with frequent high pressuring throughout shift. multiple flushes needed to 
clear the line and resume CVVH. Will continue to monitor.

## 2019-12-08 NOTE — NUR
levophed and dobutamine in place to keep MAP greater then 60 hepr[address] drip in place to keep 
PTT 45-60, insulin drip in place to keep blood glucose 110-150. fentanyl drip in place for 
pain control versed drip in place for sedation, protocol followed for titration of 
medications all vasoactive medications transfusing via a CVL.

## 2019-12-09 VITALS — SYSTOLIC BLOOD PRESSURE: 146 MMHG | DIASTOLIC BLOOD PRESSURE: 52 MMHG

## 2019-12-09 VITALS — SYSTOLIC BLOOD PRESSURE: 141 MMHG | DIASTOLIC BLOOD PRESSURE: 33 MMHG

## 2019-12-09 VITALS — SYSTOLIC BLOOD PRESSURE: 94 MMHG | DIASTOLIC BLOOD PRESSURE: 55 MMHG

## 2019-12-09 VITALS — DIASTOLIC BLOOD PRESSURE: 54 MMHG | SYSTOLIC BLOOD PRESSURE: 100 MMHG

## 2019-12-09 VITALS — SYSTOLIC BLOOD PRESSURE: 107 MMHG | DIASTOLIC BLOOD PRESSURE: 33 MMHG

## 2019-12-09 VITALS — DIASTOLIC BLOOD PRESSURE: 56 MMHG | SYSTOLIC BLOOD PRESSURE: 99 MMHG

## 2019-12-09 VITALS — DIASTOLIC BLOOD PRESSURE: 55 MMHG | SYSTOLIC BLOOD PRESSURE: 92 MMHG

## 2019-12-09 VITALS — SYSTOLIC BLOOD PRESSURE: 88 MMHG | DIASTOLIC BLOOD PRESSURE: 49 MMHG

## 2019-12-09 VITALS — SYSTOLIC BLOOD PRESSURE: 92 MMHG | DIASTOLIC BLOOD PRESSURE: 52 MMHG

## 2019-12-09 VITALS — SYSTOLIC BLOOD PRESSURE: 118 MMHG | DIASTOLIC BLOOD PRESSURE: 28 MMHG

## 2019-12-09 VITALS — SYSTOLIC BLOOD PRESSURE: 93 MMHG | DIASTOLIC BLOOD PRESSURE: 51 MMHG

## 2019-12-09 VITALS — SYSTOLIC BLOOD PRESSURE: 116 MMHG | DIASTOLIC BLOOD PRESSURE: 58 MMHG

## 2019-12-09 VITALS — DIASTOLIC BLOOD PRESSURE: 51 MMHG | SYSTOLIC BLOOD PRESSURE: 93 MMHG

## 2019-12-09 VITALS — SYSTOLIC BLOOD PRESSURE: 91 MMHG | DIASTOLIC BLOOD PRESSURE: 52 MMHG

## 2019-12-09 VITALS — SYSTOLIC BLOOD PRESSURE: 87 MMHG | DIASTOLIC BLOOD PRESSURE: 48 MMHG

## 2019-12-09 VITALS — SYSTOLIC BLOOD PRESSURE: 99 MMHG | DIASTOLIC BLOOD PRESSURE: 52 MMHG

## 2019-12-09 VITALS — SYSTOLIC BLOOD PRESSURE: 97 MMHG | DIASTOLIC BLOOD PRESSURE: 55 MMHG

## 2019-12-09 VITALS — SYSTOLIC BLOOD PRESSURE: 98 MMHG | DIASTOLIC BLOOD PRESSURE: 50 MMHG

## 2019-12-09 VITALS — DIASTOLIC BLOOD PRESSURE: 50 MMHG | SYSTOLIC BLOOD PRESSURE: 90 MMHG

## 2019-12-09 VITALS — DIASTOLIC BLOOD PRESSURE: 34 MMHG | SYSTOLIC BLOOD PRESSURE: 130 MMHG

## 2019-12-09 VITALS — DIASTOLIC BLOOD PRESSURE: 48 MMHG | SYSTOLIC BLOOD PRESSURE: 85 MMHG

## 2019-12-09 VITALS — DIASTOLIC BLOOD PRESSURE: 49 MMHG | SYSTOLIC BLOOD PRESSURE: 87 MMHG

## 2019-12-09 VITALS — SYSTOLIC BLOOD PRESSURE: 132 MMHG | DIASTOLIC BLOOD PRESSURE: 37 MMHG

## 2019-12-09 VITALS — SYSTOLIC BLOOD PRESSURE: 146 MMHG | DIASTOLIC BLOOD PRESSURE: 29 MMHG

## 2019-12-09 LAB
ALBUMIN SERPL BCP-MCNC: 2 G/DL (ref 3.4–5)
ALBUMIN SERPL BCP-MCNC: 2 G/DL (ref 3.4–5)
ALBUMIN SERPL BCP-MCNC: 2.3 G/DL (ref 3.4–5)
ALBUMIN SERPL BCP-MCNC: 2.6 G/DL (ref 3.4–5)
ALBUMIN/GLOB SERPL: 0.6 {RATIO} (ref 1.1–1.5)
ALP SERPL-CCNC: 279 IU/L (ref 46–116)
ALT SERPL W P-5'-P-CCNC: 53 U/L (ref 12–78)
ANION GAP SERPL CALCULATED.3IONS-SCNC: 3 MMOL/L (ref 8–16)
ANION GAP SERPL CALCULATED.3IONS-SCNC: 6 MMOL/L (ref 8–16)
ANION GAP SERPL CALCULATED.3IONS-SCNC: 7 MMOL/L (ref 8–16)
ANION GAP SERPL CALCULATED.3IONS-SCNC: 7 MMOL/L (ref 8–16)
ANISOCYTOSIS BLD QL SMEAR: (no result)
ANISOCYTOSIS BLD QL SMEAR: (no result)
AST SERPL W P-5'-P-CCNC: 62 U/L (ref 10–37)
BASE EXCESS BLDA CALC-SCNC: 1.6 MMOL/L (ref -2–3)
BASOPHILS # BLD AUTO: 0 X10'3 (ref 0–0.2)
BASOPHILS NFR BLD AUTO: 0 % (ref 0–1)
BASOPHILS NFR BLD AUTO: 0.1 % (ref 0–1)
BILIRUB SERPL-MCNC: 1.2 MG/DL (ref 0.1–1)
BODY TEMPERATURE: 36.6
BUN SERPL-MCNC: 30 MG/DL (ref 7–18)
BUN SERPL-MCNC: 34 MG/DL (ref 7–18)
BUN SERPL-MCNC: 36 MG/DL (ref 7–18)
BUN SERPL-MCNC: 36 MG/DL (ref 7–18)
BUN/CREAT SERPL: 19.1 (ref 5.4–32)
BUN/CREAT SERPL: 19.5 (ref 5.4–32)
BUN/CREAT SERPL: 19.9 (ref 5.4–32)
BUN/CREAT SERPL: 20.1 (ref 5.4–32)
CALCIUM SERPL-MCNC: 7.8 MG/DL (ref 8.5–10.1)
CHLORIDE SERPL-SCNC: 103 MMOL/L (ref 99–107)
CHLORIDE SERPL-SCNC: 103 MMOL/L (ref 99–107)
CHLORIDE SERPL-SCNC: 104 MMOL/L (ref 99–107)
CHLORIDE SERPL-SCNC: 104 MMOL/L (ref 99–107)
CO2 SERPL-SCNC: 25.8 MMOL/L (ref 24–32)
CO2 SERPL-SCNC: 27.1 MMOL/L (ref 24–32)
CO2 SERPL-SCNC: 27.5 MMOL/L (ref 24–32)
CO2 SERPL-SCNC: 29.7 MMOL/L (ref 24–32)
COHGB MFR BLDA: 0.3 % (ref 0.5–1.5)
CREAT SERPL-MCNC: 1.51 MG/DL (ref 0.6–1.1)
CREAT SERPL-MCNC: 1.74 MG/DL (ref 0.6–1.1)
CREAT SERPL-MCNC: 1.79 MG/DL (ref 0.6–1.1)
CREAT SERPL-MCNC: 1.88 MG/DL (ref 0.6–1.1)
EOSINOPHIL # BLD AUTO: 0 X10'3 (ref 0–0.9)
EOSINOPHIL NFR BLD AUTO: 0 % (ref 0–6)
EOSINOPHIL NFR BLD AUTO: 0.1 % (ref 0–6)
ERYTHROCYTE [DISTWIDTH] IN BLOOD BY AUTOMATED COUNT: 14 % (ref 11.5–14.5)
ERYTHROCYTE [DISTWIDTH] IN BLOOD BY AUTOMATED COUNT: 14.1 % (ref 11.5–14.5)
ERYTHROCYTE [DISTWIDTH] IN BLOOD BY AUTOMATED COUNT: 14.5 % (ref 11.5–14.5)
ERYTHROCYTE [DISTWIDTH] IN BLOOD BY AUTOMATED COUNT: 14.6 % (ref 11.5–14.5)
GFR SERPL CREATININE-BSD FRML MDRD: 35 ML/MIN
GFR SERPL CREATININE-BSD FRML MDRD: 37 ML/MIN
GFR SERPL CREATININE-BSD FRML MDRD: 39 ML/MIN
GFR SERPL CREATININE-BSD FRML MDRD: 46 ML/MIN
GLUCOSE SERPL-MCNC: 127 MG/DL (ref 70–104)
GLUCOSE SERPL-MCNC: 135 MG/DL (ref 70–104)
GLUCOSE SERPL-MCNC: 139 MG/DL (ref 70–104)
GLUCOSE SERPL-MCNC: 161 MG/DL (ref 70–104)
HCO3 BLDA-SCNC: 25.4 MMOL/L (ref 22–26)
HCT VFR BLD AUTO: 23.8 % (ref 42–52)
HCT VFR BLD AUTO: 24.9 % (ref 42–52)
HCT VFR BLD AUTO: 25.4 % (ref 42–52)
HCT VFR BLD AUTO: 28.1 % (ref 42–52)
HGB BLD-MCNC: 7.9 G/DL (ref 14–17.9)
HGB BLD-MCNC: 8.4 G/DL (ref 14–17.9)
HGB BLD-MCNC: 8.6 G/DL (ref 14–17.9)
HGB BLD-MCNC: 9.4 G/DL (ref 14–17.9)
HGB BLDA-MCNC: 10.4 G/DL (ref 14–17.9)
LYMPHOCYTES # BLD AUTO: 1.9 X10'3 (ref 1.1–4.8)
LYMPHOCYTES # BLD AUTO: 2.1 X10'3 (ref 1.1–4.8)
LYMPHOCYTES # BLD AUTO: 2.4 X10'3 (ref 1.1–4.8)
LYMPHOCYTES # BLD AUTO: 2.6 X10'3 (ref 1.1–4.8)
LYMPHOCYTES NFR BLD AUTO: 11.6 % (ref 21–51)
LYMPHOCYTES NFR BLD AUTO: 8.6 % (ref 21–51)
LYMPHOCYTES NFR BLD AUTO: 9.7 % (ref 21–51)
LYMPHOCYTES NFR BLD AUTO: 9.8 % (ref 21–51)
LYMPHOCYTES NFR BLD MANUAL: 11 % (ref 21–51)
LYMPHOCYTES NFR BLD MANUAL: 12 % (ref 21–51)
MAGNESIUM SERPL-MCNC: 1.9 MG/DL (ref 1.5–2.4)
MAGNESIUM SERPL-MCNC: 1.9 MG/DL (ref 1.5–2.4)
MAGNESIUM SERPL-MCNC: 2.2 MG/DL (ref 1.5–2.4)
MAGNESIUM SERPL-MCNC: 2.6 MG/DL (ref 1.5–2.4)
MCH RBC QN AUTO: 31.1 PG (ref 27–31)
MCH RBC QN AUTO: 31.2 PG (ref 27–31)
MCH RBC QN AUTO: 31.3 PG (ref 27–31)
MCH RBC QN AUTO: 31.4 PG (ref 27–31)
MCHC RBC AUTO-ENTMCNC: 33.2 G/DL (ref 33–36.5)
MCHC RBC AUTO-ENTMCNC: 33.5 G/DL (ref 33–36.5)
MCHC RBC AUTO-ENTMCNC: 33.6 G/DL (ref 33–36.5)
MCHC RBC AUTO-ENTMCNC: 33.8 G/DL (ref 33–36.5)
MCV RBC AUTO: 92.6 FL (ref 78–98)
MCV RBC AUTO: 92.7 FL (ref 78–98)
MCV RBC AUTO: 93.5 FL (ref 78–98)
MCV RBC AUTO: 93.9 FL (ref 78–98)
METAMYELOCYTES NFR BLD MANUAL: 3 % (ref 0–0)
METAMYELOCYTES NFR BLD MANUAL: 3 % (ref 0–0)
METHGB MFR BLDA: 0 % (ref 0.3–1.12)
MONOCYTES # BLD AUTO: 2.5 X10'3 (ref 0–0.9)
MONOCYTES # BLD AUTO: 2.6 X10'3 (ref 0–0.9)
MONOCYTES # BLD AUTO: 3.1 X10'3 (ref 0–0.9)
MONOCYTES # BLD AUTO: 3.5 X10'3 (ref 0–0.9)
MONOCYTES NFR BLD AUTO: 11.7 % (ref 2–12)
MONOCYTES NFR BLD AUTO: 12.2 % (ref 2–12)
MONOCYTES NFR BLD AUTO: 12.6 % (ref 2–12)
MONOCYTES NFR BLD AUTO: 15.9 % (ref 2–12)
MONOCYTES NFR BLD MANUAL: 13 % (ref 2–12)
MONOCYTES NFR BLD MANUAL: 9 % (ref 2–12)
MYELOCYTES NFR BLD MANUAL: 1 % (ref 0–0)
NEUTROPHILS # BLD AUTO: 16.2 X10'3 (ref 1.8–7.7)
NEUTROPHILS # BLD AUTO: 16.4 X10'3 (ref 1.8–7.7)
NEUTROPHILS # BLD AUTO: 17.2 X10'3 (ref 1.8–7.7)
NEUTROPHILS # BLD AUTO: 18.9 X10'3 (ref 1.8–7.7)
NEUTROPHILS NFR BLD AUTO: 72.4 % (ref 42–75)
NEUTROPHILS NFR BLD AUTO: 77.7 % (ref 42–75)
NEUTROPHILS NFR BLD AUTO: 78.5 % (ref 42–75)
NEUTROPHILS NFR BLD AUTO: 79.1 % (ref 42–75)
NEUTS BAND # BLD MANUAL: 4 % (ref 0–10)
NEUTS BAND # BLD MANUAL: 4 % (ref 0–10)
NEUTS SEG NFR BLD MANUAL: 68 % (ref 42–75)
NEUTS SEG NFR BLD MANUAL: 72 % (ref 42–75)
NRBC BLD MANUAL-RTO: 12 /100WBC (ref 0–0)
NRBC BLD MANUAL-RTO: 12 /100WBC (ref 0–0)
O2/TOTAL GAS SETTING VFR VENT: 30 MMHG/%
OXYHGB MFR BLDA: 97.5 % (ref 94–100)
PCO2 TEMP ADJ BLDA: 36.3 MMHG (ref 35–45)
PEEP RESPIRATORY: 5 CM H2O
PH TEMP ADJ BLDA: 7.46 [PH] (ref 7.35–7.45)
PHOSPHATE SERPL-MCNC: 2.1 MG/DL (ref 2.3–4.5)
PHOSPHATE SERPL-MCNC: 2.7 MG/DL (ref 2.3–4.5)
PHOSPHATE SERPL-MCNC: 2.8 MG/DL (ref 2.3–4.5)
PHOSPHATE SERPL-MCNC: 3.1 MG/DL (ref 2.3–4.5)
PLATELET # BLD AUTO: 198 X10'3 (ref 140–440)
PLATELET # BLD AUTO: 201 X10'3 (ref 140–440)
PLATELET # BLD AUTO: 206 X10'3 (ref 140–440)
PLATELET # BLD AUTO: 232 X10'3 (ref 140–440)
PLATELET BLD QL SMEAR: NORMAL
PLATELET BLD QL SMEAR: NORMAL
PMV BLD AUTO: 7.8 FL (ref 7.4–10.4)
PMV BLD AUTO: 8 FL (ref 7.4–10.4)
PMV BLD AUTO: 8.1 FL (ref 7.4–10.4)
PMV BLD AUTO: 8.2 FL (ref 7.4–10.4)
PO2 TEMP ADJ BLD: 102.1 MMHG (ref 83–108)
POLYCHROMASIA BLD QL SMEAR: (no result)
POLYCHROMASIA BLD QL SMEAR: (no result)
POTASSIUM SERPL-SCNC: 4.5 MMOL/L (ref 3.5–5.1)
POTASSIUM SERPL-SCNC: 4.5 MMOL/L (ref 3.5–5.1)
POTASSIUM SERPL-SCNC: 4.6 MMOL/L (ref 3.5–5.1)
POTASSIUM SERPL-SCNC: 4.7 MMOL/L (ref 3.5–5.1)
PREALB SERPL-MCNC: 18 MG/DL (ref 19–36)
PROT SERPL-MCNC: 6.4 G/DL (ref 6.4–8.2)
RBC # BLD AUTO: 2.54 X10'6 (ref 4.7–6.1)
RBC # BLD AUTO: 2.69 X10'6 (ref 4.7–6.1)
RBC # BLD AUTO: 2.74 X10'6 (ref 4.7–6.1)
RBC # BLD AUTO: 3 X10'6 (ref 4.7–6.1)
RBC MORPH BLD: (no result)
RBC MORPH BLD: (no result)
RESP RATE 1H: 18 B/MIN
RESP RATE RESTING: 19 B/MIN
SAO2 % BLDA: 97.8 % (ref 95–98)
SODIUM SERPL-SCNC: 136 MMOL/L (ref 135–145)
SODIUM SERPL-SCNC: 137 MMOL/L (ref 135–145)
SPONT VT COMPRES GAS VOL SET UNCOR VENT: 500 ML
TOTAL CELLS COUNTED FLD: 100
TOTAL CELLS COUNTED FLD: 100
VOL.EXP/M/BW ON VENT: 10 L/MIN
WBC # BLD AUTO: 21 X10'3 (ref 4.5–11)
WBC # BLD AUTO: 21.7 X10'3 (ref 4.5–11)
WBC # BLD AUTO: 22.3 X10'3 (ref 4.5–11)
WBC # BLD AUTO: 24.4 X10'3 (ref 4.5–11)

## 2019-12-09 PROCEDURE — 5A1D90Z PERFORMANCE OF URINARY FILTRATION, CONTINUOUS, GREATER THAN 18 HOURS PER DAY: ICD-10-PCS | Performed by: INTERNAL MEDICINE

## 2019-12-09 RX ADMIN — DOBUTAMINE IN DEXTROSE SCH MLS/HR: 200 INJECTION, SOLUTION INTRAVENOUS at 14:01

## 2019-12-09 RX ADMIN — METOCLOPRAMIDE SCH MG: 5 INJECTION, SOLUTION INTRAMUSCULAR; INTRAVENOUS at 20:15

## 2019-12-09 RX ADMIN — Medication SCH MLS/HR: at 05:03

## 2019-12-09 RX ADMIN — IPRATROPIUM BROMIDE AND ALBUTEROL SULFATE SCH ML: .5; 3 SOLUTION RESPIRATORY (INHALATION) at 02:54

## 2019-12-09 RX ADMIN — METHYLPREDNISOLONE SODIUM SUCCINATE SCH MG: 40 INJECTION, POWDER, FOR SOLUTION INTRAMUSCULAR; INTRAVENOUS at 03:22

## 2019-12-09 RX ADMIN — IPRATROPIUM BROMIDE AND ALBUTEROL SULFATE SCH ML: .5; 3 SOLUTION RESPIRATORY (INHALATION) at 00:16

## 2019-12-09 RX ADMIN — Medication SCH MLS/HR: at 19:13

## 2019-12-09 RX ADMIN — IPRATROPIUM BROMIDE AND ALBUTEROL SULFATE SCH ML: .5; 3 SOLUTION RESPIRATORY (INHALATION) at 19:06

## 2019-12-09 RX ADMIN — DOCUSATE SODIUM SCH MG: 50 LIQUID ORAL at 07:26

## 2019-12-09 RX ADMIN — Medication SCH MMU: at 20:15

## 2019-12-09 RX ADMIN — DEXTROSE SCH MLS/HR: 5 SOLUTION INTRAVENOUS at 14:17

## 2019-12-09 RX ADMIN — HEPARIN SODIUM AND DEXTROSE SCH MLS/HR: 10000; 5 INJECTION INTRAVENOUS at 03:31

## 2019-12-09 RX ADMIN — MINERAL OIL, PETROLATUM SCH INCH: 425; 568 OINTMENT OPHTHALMIC at 07:42

## 2019-12-09 RX ADMIN — MINERAL OIL, PETROLATUM SCH INCH: 425; 568 OINTMENT OPHTHALMIC at 14:17

## 2019-12-09 RX ADMIN — Medication SCH MLS/HR: at 09:48

## 2019-12-09 RX ADMIN — Medication SCH MMU: at 07:31

## 2019-12-09 RX ADMIN — INSULIN HUMAN SCH MLS/HR: 100 INJECTION, SOLUTION PARENTERAL at 10:20

## 2019-12-09 RX ADMIN — IPRATROPIUM BROMIDE AND ALBUTEROL SULFATE SCH ML: .5; 3 SOLUTION RESPIRATORY (INHALATION) at 07:47

## 2019-12-09 RX ADMIN — CEFEPIME HYDROCHLORIDE SCH MLS/HR: 1 INJECTION, SOLUTION INTRAVENOUS at 07:32

## 2019-12-09 RX ADMIN — DOBUTAMINE IN DEXTROSE SCH MLS/HR: 200 INJECTION, SOLUTION INTRAVENOUS at 22:58

## 2019-12-09 RX ADMIN — METHYLPREDNISOLONE SODIUM SUCCINATE SCH MG: 40 INJECTION, POWDER, FOR SOLUTION INTRAMUSCULAR; INTRAVENOUS at 14:16

## 2019-12-09 RX ADMIN — Medication SCH MLS/HR: at 23:02

## 2019-12-09 RX ADMIN — Medication SCH MLS/HR: at 05:01

## 2019-12-09 RX ADMIN — Medication SCH MLS/HR: at 09:47

## 2019-12-09 RX ADMIN — METHYLPREDNISOLONE SODIUM SUCCINATE SCH MG: 40 INJECTION, POWDER, FOR SOLUTION INTRAMUSCULAR; INTRAVENOUS at 07:27

## 2019-12-09 RX ADMIN — Medication SCH MLS/HR: at 09:49

## 2019-12-09 RX ADMIN — Medication SCH MLS/HR: at 23:01

## 2019-12-09 RX ADMIN — DOBUTAMINE IN DEXTROSE SCH MLS/HR: 200 INJECTION, SOLUTION INTRAVENOUS at 05:04

## 2019-12-09 RX ADMIN — IPRATROPIUM BROMIDE AND ALBUTEROL SULFATE SCH ML: .5; 3 SOLUTION RESPIRATORY (INHALATION) at 14:32

## 2019-12-09 RX ADMIN — Medication SCH MLS/HR: at 14:18

## 2019-12-09 RX ADMIN — MAGNESIUM SULFATE IN WATER PRN MLS/HR: 40 INJECTION, SOLUTION INTRAVENOUS at 09:18

## 2019-12-09 RX ADMIN — MINERAL OIL, PETROLATUM SCH INCH: 425; 568 OINTMENT OPHTHALMIC at 03:23

## 2019-12-09 RX ADMIN — METHYLPREDNISOLONE SODIUM SUCCINATE SCH MG: 40 INJECTION, POWDER, FOR SOLUTION INTRAMUSCULAR; INTRAVENOUS at 20:15

## 2019-12-09 RX ADMIN — Medication SCH MLS/HR: at 14:17

## 2019-12-09 RX ADMIN — Medication SCH MLS/HR: at 23:00

## 2019-12-09 RX ADMIN — Medication PRN MLS/HR: at 05:01

## 2019-12-09 RX ADMIN — Medication SCH MLS/HR: at 19:12

## 2019-12-09 RX ADMIN — METOCLOPRAMIDE SCH MG: 5 INJECTION, SOLUTION INTRAMUSCULAR; INTRAVENOUS at 03:23

## 2019-12-09 RX ADMIN — VANCOMYCIN HYDROCHLORIDE SCH MLS/HR: 750 INJECTION, POWDER, LYOPHILIZED, FOR SOLUTION INTRAVENOUS at 11:54

## 2019-12-09 RX ADMIN — DEXTROSE SCH MLS/HR: 5 SOLUTION INTRAVENOUS at 20:43

## 2019-12-09 RX ADMIN — Medication SCH MLS/HR: at 03:23

## 2019-12-09 RX ADMIN — METOCLOPRAMIDE SCH MG: 5 INJECTION, SOLUTION INTRAMUSCULAR; INTRAVENOUS at 07:28

## 2019-12-09 RX ADMIN — Medication SCH MLS/HR: at 08:43

## 2019-12-09 RX ADMIN — IPRATROPIUM BROMIDE AND ALBUTEROL SULFATE SCH ML: .5; 3 SOLUTION RESPIRATORY (INHALATION) at 11:07

## 2019-12-09 RX ADMIN — METOCLOPRAMIDE SCH MG: 5 INJECTION, SOLUTION INTRAMUSCULAR; INTRAVENOUS at 14:16

## 2019-12-09 RX ADMIN — HEPARIN SODIUM SCH UNIT: 5000 INJECTION, SOLUTION INTRAVENOUS; SUBCUTANEOUS at 20:15

## 2019-12-09 RX ADMIN — DOCUSATE SODIUM SCH MG: 50 LIQUID ORAL at 20:15

## 2019-12-09 RX ADMIN — MINERAL OIL, PETROLATUM SCH INCH: 425; 568 OINTMENT OPHTHALMIC at 20:16

## 2019-12-09 RX ADMIN — IPRATROPIUM BROMIDE AND ALBUTEROL SULFATE SCH ML: .5; 3 SOLUTION RESPIRATORY (INHALATION) at 22:48

## 2019-12-09 RX ADMIN — INSULIN HUMAN SCH MLS/HR: 100 INJECTION, SOLUTION PARENTERAL at 17:05

## 2019-12-09 RX ADMIN — Medication SCH MLS/HR: at 05:02

## 2019-12-09 NOTE — NUR
Notified Dr. Mccall that the patient is not tolerating pulling 50 ml off with CVVH. States 
okay. Notified him that we are trying to meet zero and slowly increasing amount by 10 ml. 
Also requested albumin due to swelling in hands and legs. Received order for albumin 25% 100 
ml IV Q6H for 3 days. Will continue to monitor.

## 2019-12-09 NOTE — NUR
Patient in room ICU 2039. I have received report from Gloria GONZALEZ and had the opportunity to 
ask questions and assume patient care.

## 2019-12-09 NOTE — NUR
Received order from Dr. Jones to discontinue heparin drip due to balloon pump our and 
start intensivist order.

## 2019-12-09 NOTE — NUR
cvvh screen went black and stopped working unable to restart the CVVH machine. Oncall 
dialysis nurse contacted, she will be in to restart CVVH.

## 2019-12-09 NOTE — NUR
I have received report and assumed care of pt, pt resting in bed rise and fall of chest 
cavity equile and symmetrical, CVVH in place, Levophed being titrated to keep MAP greater 
then 60 dobut[address] to keep MAP greater then 60 insulin drip titrated to keep 
glucose level 110-150. low does versed in place for sedation and fentanyl in place for pain 
control. all vasoactive medication transfusing via CVL pt opens eyes spontaneously, moves 
all extremitas and thrashes head from side to side with oral care, he does not make eye 
contact nor squeeze hand when asked.

## 2019-12-10 VITALS — SYSTOLIC BLOOD PRESSURE: 90 MMHG | DIASTOLIC BLOOD PRESSURE: 50 MMHG

## 2019-12-10 VITALS — SYSTOLIC BLOOD PRESSURE: 95 MMHG | DIASTOLIC BLOOD PRESSURE: 56 MMHG

## 2019-12-10 VITALS — DIASTOLIC BLOOD PRESSURE: 58 MMHG | SYSTOLIC BLOOD PRESSURE: 100 MMHG

## 2019-12-10 VITALS — DIASTOLIC BLOOD PRESSURE: 54 MMHG | SYSTOLIC BLOOD PRESSURE: 99 MMHG

## 2019-12-10 VITALS — SYSTOLIC BLOOD PRESSURE: 101 MMHG | DIASTOLIC BLOOD PRESSURE: 59 MMHG

## 2019-12-10 VITALS — SYSTOLIC BLOOD PRESSURE: 88 MMHG | DIASTOLIC BLOOD PRESSURE: 52 MMHG

## 2019-12-10 VITALS — SYSTOLIC BLOOD PRESSURE: 107 MMHG | DIASTOLIC BLOOD PRESSURE: 65 MMHG

## 2019-12-10 VITALS — DIASTOLIC BLOOD PRESSURE: 60 MMHG | SYSTOLIC BLOOD PRESSURE: 108 MMHG

## 2019-12-10 VITALS — DIASTOLIC BLOOD PRESSURE: 49 MMHG | SYSTOLIC BLOOD PRESSURE: 89 MMHG

## 2019-12-10 VITALS — DIASTOLIC BLOOD PRESSURE: 58 MMHG | SYSTOLIC BLOOD PRESSURE: 96 MMHG

## 2019-12-10 VITALS — DIASTOLIC BLOOD PRESSURE: 61 MMHG | SYSTOLIC BLOOD PRESSURE: 102 MMHG

## 2019-12-10 VITALS — SYSTOLIC BLOOD PRESSURE: 95 MMHG | DIASTOLIC BLOOD PRESSURE: 53 MMHG

## 2019-12-10 VITALS — DIASTOLIC BLOOD PRESSURE: 53 MMHG | SYSTOLIC BLOOD PRESSURE: 87 MMHG

## 2019-12-10 VITALS — SYSTOLIC BLOOD PRESSURE: 93 MMHG | DIASTOLIC BLOOD PRESSURE: 51 MMHG

## 2019-12-10 VITALS — DIASTOLIC BLOOD PRESSURE: 65 MMHG | SYSTOLIC BLOOD PRESSURE: 107 MMHG

## 2019-12-10 VITALS — SYSTOLIC BLOOD PRESSURE: 103 MMHG | DIASTOLIC BLOOD PRESSURE: 58 MMHG

## 2019-12-10 VITALS — SYSTOLIC BLOOD PRESSURE: 106 MMHG | DIASTOLIC BLOOD PRESSURE: 58 MMHG

## 2019-12-10 VITALS — SYSTOLIC BLOOD PRESSURE: 87 MMHG | DIASTOLIC BLOOD PRESSURE: 53 MMHG

## 2019-12-10 VITALS — DIASTOLIC BLOOD PRESSURE: 59 MMHG | SYSTOLIC BLOOD PRESSURE: 104 MMHG

## 2019-12-10 VITALS — SYSTOLIC BLOOD PRESSURE: 93 MMHG | DIASTOLIC BLOOD PRESSURE: 55 MMHG

## 2019-12-10 VITALS — DIASTOLIC BLOOD PRESSURE: 54 MMHG | SYSTOLIC BLOOD PRESSURE: 96 MMHG

## 2019-12-10 VITALS — SYSTOLIC BLOOD PRESSURE: 87 MMHG | DIASTOLIC BLOOD PRESSURE: 45 MMHG

## 2019-12-10 VITALS — SYSTOLIC BLOOD PRESSURE: 92 MMHG | DIASTOLIC BLOOD PRESSURE: 52 MMHG

## 2019-12-10 VITALS — SYSTOLIC BLOOD PRESSURE: 98 MMHG | DIASTOLIC BLOOD PRESSURE: 57 MMHG

## 2019-12-10 VITALS — DIASTOLIC BLOOD PRESSURE: 48 MMHG | SYSTOLIC BLOOD PRESSURE: 94 MMHG

## 2019-12-10 VITALS — DIASTOLIC BLOOD PRESSURE: 49 MMHG | SYSTOLIC BLOOD PRESSURE: 97 MMHG

## 2019-12-10 VITALS — SYSTOLIC BLOOD PRESSURE: 108 MMHG | DIASTOLIC BLOOD PRESSURE: 63 MMHG

## 2019-12-10 VITALS — DIASTOLIC BLOOD PRESSURE: 62 MMHG | SYSTOLIC BLOOD PRESSURE: 105 MMHG

## 2019-12-10 VITALS — DIASTOLIC BLOOD PRESSURE: 49 MMHG | SYSTOLIC BLOOD PRESSURE: 85 MMHG

## 2019-12-10 LAB
ALBUMIN SERPL BCP-MCNC: 2.9 G/DL (ref 3.4–5)
ALBUMIN SERPL BCP-MCNC: 3.5 G/DL (ref 3.4–5)
ALBUMIN SERPL BCP-MCNC: 3.5 G/DL (ref 3.4–5)
ALBUMIN SERPL BCP-MCNC: 3.7 G/DL (ref 3.4–5)
ALBUMIN/GLOB SERPL: 0.8 {RATIO} (ref 1.1–1.5)
ALP SERPL-CCNC: 215 IU/L (ref 46–116)
ALT SERPL W P-5'-P-CCNC: 45 U/L (ref 12–78)
ANION GAP SERPL CALCULATED.3IONS-SCNC: 5 MMOL/L (ref 8–16)
ANION GAP SERPL CALCULATED.3IONS-SCNC: 5 MMOL/L (ref 8–16)
ANION GAP SERPL CALCULATED.3IONS-SCNC: 6 MMOL/L (ref 8–16)
ANION GAP SERPL CALCULATED.3IONS-SCNC: 7 MMOL/L (ref 8–16)
ANISOCYTOSIS BLD QL SMEAR: (no result)
ANISOCYTOSIS BLD QL SMEAR: (no result)
APTT PPP: 25 SECONDS (ref 22–32)
AST SERPL W P-5'-P-CCNC: 52 U/L (ref 10–37)
BASE EXCESS BLDA CALC-SCNC: 2.2 MMOL/L (ref -2–3)
BASOPHILS # BLD AUTO: 0 X10'3 (ref 0–0.2)
BASOPHILS NFR BLD AUTO: 0 % (ref 0–1)
BASOPHILS NFR BLD AUTO: 0.1 % (ref 0–1)
BASOPHILS NFR BLD AUTO: 0.1 % (ref 0–1)
BASOPHILS NFR BLD AUTO: 0.2 % (ref 0–1)
BILIRUB SERPL-MCNC: 1.5 MG/DL (ref 0.1–1)
BODY TEMPERATURE: 36.3
BUN SERPL-MCNC: 25 MG/DL (ref 7–18)
BUN SERPL-MCNC: 27 MG/DL (ref 7–18)
BUN SERPL-MCNC: 28 MG/DL (ref 7–18)
BUN SERPL-MCNC: 30 MG/DL (ref 7–18)
BUN/CREAT SERPL: 18.7 (ref 5.4–32)
BUN/CREAT SERPL: 19.4 (ref 5.4–32)
BUN/CREAT SERPL: 20 (ref 5.4–32)
BUN/CREAT SERPL: 20 (ref 5.4–32)
BURR CELLS BLD QL SMEAR: (no result)
BURR CELLS BLD QL SMEAR: (no result)
BURR CELLS BLD QL SMEAR: 5
CALCIUM SERPL-MCNC: 8.4 MG/DL (ref 8.5–10.1)
CHLORIDE SERPL-SCNC: 103 MMOL/L (ref 99–107)
CO2 SERPL-SCNC: 26.6 MMOL/L (ref 24–32)
CO2 SERPL-SCNC: 29 MMOL/L (ref 24–32)
CO2 SERPL-SCNC: 29.3 MMOL/L (ref 24–32)
CO2 SERPL-SCNC: 29.9 MMOL/L (ref 24–32)
COHGB MFR BLDA: 0.5 % (ref 0.5–1.5)
CREAT SERPL-MCNC: 1.34 MG/DL (ref 0.6–1.1)
CREAT SERPL-MCNC: 1.39 MG/DL (ref 0.6–1.1)
CREAT SERPL-MCNC: 1.4 MG/DL (ref 0.6–1.1)
CREAT SERPL-MCNC: 1.5 MG/DL (ref 0.6–1.1)
DACRYOCYTES BLD QL SMEAR: (no result)
EOSINOPHIL # BLD AUTO: 0 X10'3 (ref 0–0.9)
EOSINOPHIL NFR BLD AUTO: 0 % (ref 0–6)
EOSINOPHIL NFR BLD MANUAL: 0 % (ref 0–6)
ERYTHROCYTE [DISTWIDTH] IN BLOOD BY AUTOMATED COUNT: 14.5 % (ref 11.5–14.5)
ERYTHROCYTE [DISTWIDTH] IN BLOOD BY AUTOMATED COUNT: 14.6 % (ref 11.5–14.5)
ERYTHROCYTE [DISTWIDTH] IN BLOOD BY AUTOMATED COUNT: 14.6 % (ref 11.5–14.5)
ERYTHROCYTE [DISTWIDTH] IN BLOOD BY AUTOMATED COUNT: 15.6 % (ref 11.5–14.5)
GFR SERPL CREATININE-BSD FRML MDRD: 46 ML/MIN
GFR SERPL CREATININE-BSD FRML MDRD: 50 ML/MIN
GFR SERPL CREATININE-BSD FRML MDRD: 50 ML/MIN
GFR SERPL CREATININE-BSD FRML MDRD: 52 ML/MIN
GLUCOSE SERPL-MCNC: 120 MG/DL (ref 70–104)
GLUCOSE SERPL-MCNC: 136 MG/DL (ref 70–104)
GLUCOSE SERPL-MCNC: 158 MG/DL (ref 70–104)
GLUCOSE SERPL-MCNC: 162 MG/DL (ref 70–104)
HCO3 BLDA-SCNC: 25.6 MMOL/L (ref 22–26)
HCT VFR BLD AUTO: 22.1 % (ref 42–52)
HCT VFR BLD AUTO: 22.2 % (ref 42–52)
HCT VFR BLD AUTO: 22.9 % (ref 42–52)
HCT VFR BLD AUTO: 26 % (ref 42–52)
HGB BLD-MCNC: 7.3 G/DL (ref 14–17.9)
HGB BLD-MCNC: 7.5 G/DL (ref 14–17.9)
HGB BLD-MCNC: 7.6 G/DL (ref 14–17.9)
HGB BLD-MCNC: 8.8 G/DL (ref 14–17.9)
HGB BLDA-MCNC: 8.1 G/DL (ref 14–17.9)
LG PLATELETS BLD QL SMEAR: (no result)
LYMPHOCYTES # BLD AUTO: 1.6 X10'3 (ref 1.1–4.8)
LYMPHOCYTES # BLD AUTO: 1.7 X10'3 (ref 1.1–4.8)
LYMPHOCYTES # BLD AUTO: 1.7 X10'3 (ref 1.1–4.8)
LYMPHOCYTES # BLD AUTO: 2 X10'3 (ref 1.1–4.8)
LYMPHOCYTES NFR BLD AUTO: 6.4 % (ref 21–51)
LYMPHOCYTES NFR BLD AUTO: 7.9 % (ref 21–51)
LYMPHOCYTES NFR BLD AUTO: 8.2 % (ref 21–51)
LYMPHOCYTES NFR BLD AUTO: 9.5 % (ref 21–51)
LYMPHOCYTES NFR BLD MANUAL: 3 % (ref 21–51)
LYMPHOCYTES NFR BLD MANUAL: 5 % (ref 21–51)
LYMPHOCYTES NFR BLD MANUAL: 6 % (ref 21–51)
LYMPHOCYTES NFR BLD MANUAL: 9 % (ref 21–51)
MAGNESIUM SERPL-MCNC: 1.9 MG/DL (ref 1.5–2.4)
MAGNESIUM SERPL-MCNC: 2 MG/DL (ref 1.5–2.4)
MCH RBC QN AUTO: 30.9 PG (ref 27–31)
MCH RBC QN AUTO: 31.5 PG (ref 27–31)
MCHC RBC AUTO-ENTMCNC: 33.3 G/DL (ref 33–36.5)
MCHC RBC AUTO-ENTMCNC: 33.3 G/DL (ref 33–36.5)
MCHC RBC AUTO-ENTMCNC: 33.7 G/DL (ref 33–36.5)
MCHC RBC AUTO-ENTMCNC: 33.8 G/DL (ref 33–36.5)
MCV RBC AUTO: 91.7 FL (ref 78–98)
MCV RBC AUTO: 93.1 FL (ref 78–98)
MCV RBC AUTO: 94.5 FL (ref 78–98)
MCV RBC AUTO: 94.6 FL (ref 78–98)
METAMYELOCYTES NFR BLD MANUAL: 2 % (ref 0–0)
METAMYELOCYTES NFR BLD MANUAL: 3 % (ref 0–0)
METAMYELOCYTES NFR BLD MANUAL: 4 % (ref 0–0)
METHGB MFR BLDA: 0 % (ref 0.3–1.12)
MONOCYTES # BLD AUTO: 2 X10'3 (ref 0–0.9)
MONOCYTES # BLD AUTO: 2 X10'3 (ref 0–0.9)
MONOCYTES # BLD AUTO: 2.1 X10'3 (ref 0–0.9)
MONOCYTES # BLD AUTO: 2.5 X10'3 (ref 0–0.9)
MONOCYTES NFR BLD AUTO: 10.1 % (ref 2–12)
MONOCYTES NFR BLD AUTO: 11.5 % (ref 2–12)
MONOCYTES NFR BLD AUTO: 8.2 % (ref 2–12)
MONOCYTES NFR BLD AUTO: 9 % (ref 2–12)
MONOCYTES NFR BLD MANUAL: 4 % (ref 2–12)
MONOCYTES NFR BLD MANUAL: 7 % (ref 2–12)
MONOCYTES NFR BLD MANUAL: 7 % (ref 2–12)
MONOCYTES NFR BLD MANUAL: 9 % (ref 2–12)
MYELOCYTES NFR BLD MANUAL: 2 % (ref 0–0)
NEUTROPHILS # BLD AUTO: 16.9 X10'3 (ref 1.8–7.7)
NEUTROPHILS # BLD AUTO: 17 X10'3 (ref 1.8–7.7)
NEUTROPHILS # BLD AUTO: 18.1 X10'3 (ref 1.8–7.7)
NEUTROPHILS # BLD AUTO: 20.9 X10'3 (ref 1.8–7.7)
NEUTROPHILS NFR BLD AUTO: 79 % (ref 42–75)
NEUTROPHILS NFR BLD AUTO: 81.6 % (ref 42–75)
NEUTROPHILS NFR BLD AUTO: 83 % (ref 42–75)
NEUTROPHILS NFR BLD AUTO: 85.2 % (ref 42–75)
NEUTS BAND # BLD MANUAL: 1 % (ref 0–10)
NEUTS BAND # BLD MANUAL: 4 % (ref 0–10)
NEUTS BAND # BLD MANUAL: 4 % (ref 0–10)
NEUTS BAND # BLD MANUAL: 8 % (ref 0–10)
NEUTS SEG NFR BLD MANUAL: 78 % (ref 42–75)
NEUTS SEG NFR BLD MANUAL: 80 % (ref 42–75)
NEUTS SEG NFR BLD MANUAL: 82 % (ref 42–75)
NEUTS SEG NFR BLD MANUAL: 82 % (ref 42–75)
NRBC BLD MANUAL-RTO: 12 /100WBC (ref 0–0)
NRBC BLD MANUAL-RTO: 20 /100WBC (ref 0–0)
NRBC BLD MANUAL-RTO: 5 /100WBC (ref 0–0)
NRBC BLD MANUAL-RTO: 5 /100WBC (ref 0–0)
O2/TOTAL GAS SETTING VFR VENT: 30 MMHG/%
OVALOCYTES BLD QL SMEAR: (no result)
OXYHGB MFR BLDA: 97.2 % (ref 94–100)
PCO2 TEMP ADJ BLDA: 33.5 MMHG (ref 35–45)
PEEP RESPIRATORY: 5 CM H2O
PH TEMP ADJ BLDA: 7.5 [PH] (ref 7.35–7.45)
PHOSPHATE SERPL-MCNC: 2.6 MG/DL (ref 2.3–4.5)
PHOSPHATE SERPL-MCNC: 2.9 MG/DL (ref 2.3–4.5)
PHOSPHATE SERPL-MCNC: 2.9 MG/DL (ref 2.3–4.5)
PHOSPHATE SERPL-MCNC: 3 MG/DL (ref 2.3–4.5)
PLATELET # BLD AUTO: 181 X10'3 (ref 140–440)
PLATELET # BLD AUTO: 190 X10'3 (ref 140–440)
PLATELET # BLD AUTO: 196 X10'3 (ref 140–440)
PLATELET # BLD AUTO: 200 X10'3 (ref 140–440)
PLATELET BLD QL SMEAR: NORMAL
PMV BLD AUTO: 8.1 FL (ref 7.4–10.4)
PMV BLD AUTO: 8.2 FL (ref 7.4–10.4)
PMV BLD AUTO: 8.4 FL (ref 7.4–10.4)
PMV BLD AUTO: 8.4 FL (ref 7.4–10.4)
PO2 TEMP ADJ BLD: 99.6 MMHG (ref 83–108)
POLYCHROMASIA BLD QL SMEAR: (no result)
POTASSIUM SERPL-SCNC: 4.7 MMOL/L (ref 3.5–5.1)
POTASSIUM SERPL-SCNC: 4.8 MMOL/L (ref 3.5–5.1)
POTASSIUM SERPL-SCNC: 4.8 MMOL/L (ref 3.5–5.1)
POTASSIUM SERPL-SCNC: 5 MMOL/L (ref 3.5–5.1)
PROT SERPL-MCNC: 6.4 G/DL (ref 6.4–8.2)
RBC # BLD AUTO: 2.33 X10'6 (ref 4.7–6.1)
RBC # BLD AUTO: 2.38 X10'6 (ref 4.7–6.1)
RBC # BLD AUTO: 2.42 X10'6 (ref 4.7–6.1)
RBC # BLD AUTO: 2.84 X10'6 (ref 4.7–6.1)
RBC MORPH BLD: (no result)
RESP RATE 1H: 18 B/MIN
RESP RATE RESTING: 20 B/MIN
SAO2 % BLDA: 97.7 % (ref 95–98)
SCHISTOCYTES BLD QL SMEAR: (no result)
SODIUM SERPL-SCNC: 137 MMOL/L (ref 135–145)
SODIUM SERPL-SCNC: 137 MMOL/L (ref 135–145)
SODIUM SERPL-SCNC: 138 MMOL/L (ref 135–145)
SODIUM SERPL-SCNC: 138 MMOL/L (ref 135–145)
SPONT VT COMPRES GAS VOL SET UNCOR VENT: 500 ML
TOTAL CELLS COUNTED FLD: 100
TOXIC GRANULES BLD QL SMEAR: (no result)
VOL.EXP/M/BW ON VENT: 11 L/MIN
WBC # BLD AUTO: 20.8 X10'3 (ref 4.5–11)
WBC # BLD AUTO: 21.4 X10'3 (ref 4.5–11)
WBC # BLD AUTO: 21.9 X10'3 (ref 4.5–11)
WBC # BLD AUTO: 24.5 X10'3 (ref 4.5–11)

## 2019-12-10 PROCEDURE — 5A1D90Z PERFORMANCE OF URINARY FILTRATION, CONTINUOUS, GREATER THAN 18 HOURS PER DAY: ICD-10-PCS | Performed by: INTERNAL MEDICINE

## 2019-12-10 PROCEDURE — 30233N1 TRANSFUSION OF NONAUTOLOGOUS RED BLOOD CELLS INTO PERIPHERAL VEIN, PERCUTANEOUS APPROACH: ICD-10-PCS | Performed by: INTERNAL MEDICINE

## 2019-12-10 RX ADMIN — IPRATROPIUM BROMIDE AND ALBUTEROL SULFATE SCH ML: .5; 3 SOLUTION RESPIRATORY (INHALATION) at 02:41

## 2019-12-10 RX ADMIN — INSULIN HUMAN SCH MLS/HR: 100 INJECTION, SOLUTION PARENTERAL at 05:34

## 2019-12-10 RX ADMIN — MAGNESIUM SULFATE IN WATER PRN MLS/HR: 40 INJECTION, SOLUTION INTRAVENOUS at 21:49

## 2019-12-10 RX ADMIN — HEPARIN SODIUM SCH UNIT: 5000 INJECTION, SOLUTION INTRAVENOUS; SUBCUTANEOUS at 07:30

## 2019-12-10 RX ADMIN — Medication PRN MLS/HR: at 10:34

## 2019-12-10 RX ADMIN — Medication SCH MLS/HR: at 12:36

## 2019-12-10 RX ADMIN — Medication SCH MMU: at 19:48

## 2019-12-10 RX ADMIN — Medication SCH MLS/HR: at 17:04

## 2019-12-10 RX ADMIN — IPRATROPIUM BROMIDE AND ALBUTEROL SULFATE SCH ML: .5; 3 SOLUTION RESPIRATORY (INHALATION) at 14:25

## 2019-12-10 RX ADMIN — Medication SCH MLS/HR: at 23:00

## 2019-12-10 RX ADMIN — METOCLOPRAMIDE SCH MG: 5 INJECTION, SOLUTION INTRAMUSCULAR; INTRAVENOUS at 07:32

## 2019-12-10 RX ADMIN — CEFEPIME HYDROCHLORIDE SCH MLS/HR: 1 INJECTION, SOLUTION INTRAVENOUS at 09:06

## 2019-12-10 RX ADMIN — Medication SCH MLS/HR: at 17:02

## 2019-12-10 RX ADMIN — Medication SCH MLS/HR: at 07:43

## 2019-12-10 RX ADMIN — Medication SCH MLS/HR: at 01:00

## 2019-12-10 RX ADMIN — METHYLPREDNISOLONE SODIUM SUCCINATE SCH MG: 40 INJECTION, POWDER, FOR SOLUTION INTRAMUSCULAR; INTRAVENOUS at 07:27

## 2019-12-10 RX ADMIN — MINERAL OIL, PETROLATUM SCH INCH: 425; 568 OINTMENT OPHTHALMIC at 07:34

## 2019-12-10 RX ADMIN — DEXTROSE SCH MLS/HR: 5 SOLUTION INTRAVENOUS at 02:22

## 2019-12-10 RX ADMIN — MINERAL OIL, PETROLATUM SCH INCH: 425; 568 OINTMENT OPHTHALMIC at 02:23

## 2019-12-10 RX ADMIN — METHYLPREDNISOLONE SODIUM SUCCINATE SCH MG: 40 INJECTION, POWDER, FOR SOLUTION INTRAMUSCULAR; INTRAVENOUS at 02:21

## 2019-12-10 RX ADMIN — Medication SCH MLS/HR: at 17:03

## 2019-12-10 RX ADMIN — Medication SCH MLS/HR: at 21:36

## 2019-12-10 RX ADMIN — METOCLOPRAMIDE SCH MG: 5 INJECTION, SOLUTION INTRAMUSCULAR; INTRAVENOUS at 19:47

## 2019-12-10 RX ADMIN — MIDODRINE HYDROCHLORIDE SCH MG: 5 TABLET ORAL at 16:15

## 2019-12-10 RX ADMIN — DOBUTAMINE IN DEXTROSE SCH MLS/HR: 200 INJECTION, SOLUTION INTRAVENOUS at 00:30

## 2019-12-10 RX ADMIN — Medication PRN MLS/HR: at 23:23

## 2019-12-10 RX ADMIN — DOCUSATE SODIUM SCH MG: 50 LIQUID ORAL at 07:34

## 2019-12-10 RX ADMIN — IPRATROPIUM BROMIDE AND ALBUTEROL SULFATE SCH ML: .5; 3 SOLUTION RESPIRATORY (INHALATION) at 10:58

## 2019-12-10 RX ADMIN — MINERAL OIL, PETROLATUM SCH INCH: 425; 568 OINTMENT OPHTHALMIC at 19:47

## 2019-12-10 RX ADMIN — Medication SCH MLS/HR: at 09:53

## 2019-12-10 RX ADMIN — MIDODRINE HYDROCHLORIDE SCH MG: 5 TABLET ORAL at 23:22

## 2019-12-10 RX ADMIN — METHYLPREDNISOLONE SODIUM SUCCINATE SCH MG: 40 INJECTION, POWDER, FOR SOLUTION INTRAMUSCULAR; INTRAVENOUS at 14:10

## 2019-12-10 RX ADMIN — SODIUM CHLORIDE SCH MLS/HR: 9 INJECTION INTRAMUSCULAR; INTRAVENOUS; SUBCUTANEOUS at 16:15

## 2019-12-10 RX ADMIN — VANCOMYCIN HYDROCHLORIDE SCH MLS/HR: 750 INJECTION, POWDER, LYOPHILIZED, FOR SOLUTION INTRAVENOUS at 12:48

## 2019-12-10 RX ADMIN — Medication SCH MLS/HR: at 07:44

## 2019-12-10 RX ADMIN — METOCLOPRAMIDE SCH MG: 5 INJECTION, SOLUTION INTRAMUSCULAR; INTRAVENOUS at 02:21

## 2019-12-10 RX ADMIN — Medication SCH MLS/HR: at 12:41

## 2019-12-10 RX ADMIN — DOBUTAMINE IN DEXTROSE SCH MLS/HR: 200 INJECTION, SOLUTION INTRAVENOUS at 16:52

## 2019-12-10 RX ADMIN — METHYLPREDNISOLONE SODIUM SUCCINATE SCH MG: 40 INJECTION, POWDER, FOR SOLUTION INTRAMUSCULAR; INTRAVENOUS at 19:47

## 2019-12-10 RX ADMIN — IPRATROPIUM BROMIDE AND ALBUTEROL SULFATE SCH ML: .5; 3 SOLUTION RESPIRATORY (INHALATION) at 07:26

## 2019-12-10 RX ADMIN — Medication SCH MLS/HR: at 20:30

## 2019-12-10 RX ADMIN — METOCLOPRAMIDE SCH MG: 5 INJECTION, SOLUTION INTRAMUSCULAR; INTRAVENOUS at 14:11

## 2019-12-10 RX ADMIN — DEXTROSE SCH MLS/HR: 5 SOLUTION INTRAVENOUS at 19:48

## 2019-12-10 RX ADMIN — MAGNESIUM SULFATE IN WATER PRN MLS/HR: 40 INJECTION, SOLUTION INTRAVENOUS at 21:45

## 2019-12-10 RX ADMIN — Medication SCH MLS/HR: at 00:08

## 2019-12-10 RX ADMIN — Medication SCH MLS/HR: at 03:42

## 2019-12-10 RX ADMIN — DEXTROSE SCH MLS/HR: 5 SOLUTION INTRAVENOUS at 07:34

## 2019-12-10 RX ADMIN — DOBUTAMINE IN DEXTROSE SCH MLS/HR: 200 INJECTION, SOLUTION INTRAVENOUS at 07:55

## 2019-12-10 RX ADMIN — Medication SCH MLS/HR: at 12:42

## 2019-12-10 RX ADMIN — IPRATROPIUM BROMIDE AND ALBUTEROL SULFATE SCH ML: .5; 3 SOLUTION RESPIRATORY (INHALATION) at 22:47

## 2019-12-10 RX ADMIN — INSULIN HUMAN SCH MLS/HR: 100 INJECTION, SOLUTION PARENTERAL at 10:34

## 2019-12-10 RX ADMIN — DEXTROSE SCH MLS/HR: 5 SOLUTION INTRAVENOUS at 14:39

## 2019-12-10 RX ADMIN — IPRATROPIUM BROMIDE AND ALBUTEROL SULFATE SCH ML: .5; 3 SOLUTION RESPIRATORY (INHALATION) at 19:02

## 2019-12-10 RX ADMIN — HEPARIN SODIUM SCH UNIT: 5000 INJECTION, SOLUTION INTRAVENOUS; SUBCUTANEOUS at 19:47

## 2019-12-10 RX ADMIN — DOCUSATE SODIUM SCH MG: 50 LIQUID ORAL at 19:47

## 2019-12-10 RX ADMIN — METHYLNALTREXONE BROMIDE SCH MG: 12 INJECTION, SOLUTION SUBCUTANEOUS at 07:26

## 2019-12-10 RX ADMIN — MINERAL OIL, PETROLATUM SCH INCH: 425; 568 OINTMENT OPHTHALMIC at 14:12

## 2019-12-10 RX ADMIN — Medication SCH MMU: at 07:52

## 2019-12-10 RX ADMIN — Medication SCH MLS/HR: at 03:00

## 2019-12-10 NOTE — NUR
received report that the cb

-------------------------------------------------------------------------------

Addendum: 12/10/19 at 1844 by Jennifer Mukherjee RN

-------------------------------------------------------------------------------

cvvh filter had a big cot and was clotted through the pigtail. filter due to be changed at 
0500. attempted to contact on call dialysis nurse but there was no answer.

## 2019-12-10 NOTE — NUR
Problems reprioritized. Patient report given, questions answered & plan of care reviewed 
with Jennifer ortiz.

## 2019-12-10 NOTE — NUR
spoke with Tammie the dialysis nurse. Tammie rn spoke with Md Mccall. cvvh to resume tomorrow 
morning

## 2019-12-10 NOTE — NUR
Patient in room ICU 2039. I have received report from Pepito GONZALEZ   and had the 
opportunity to ask questions and assume patient care.

## 2019-12-10 NOTE — NUR
follow up: No BM 8 days, continues to receive reglan, colace, and relistor. has hyperactive 
bowel sounds per bedside RN. Tube feedings are tolerated, GRV are within normal limits. 
Continue to follow

-------------------------------------------------------------------------------

Addendum: 12/10/19 at 1257 by Soraida Lucas RD

-------------------------------------------------------------------------------

Amended: Links added.

## 2019-12-10 NOTE — NUR
pt is intubated, alert, not following commands. pt had ccvh running, however the machine is 
currently down until morning. lung sounds are course and diminished throughout, equal 
bilateral chest rise and fall. bowel sounds are present in all 4 quadrants but are 
hypoactive. no bm present Relistor was administered and pt is receiving colace. pt is on 
levophed and an insulin drip. heels elbows coccyx intact blanchable free from breakdown. 
vital signs stable. will continue to monitor.

## 2019-12-10 NOTE — NUR
cvvh machine clotted off. attempted to call on call dialysis nurse again, still no answer. 
will continue to monitor.

## 2019-12-11 VITALS — DIASTOLIC BLOOD PRESSURE: 32 MMHG | SYSTOLIC BLOOD PRESSURE: 109 MMHG

## 2019-12-11 VITALS — DIASTOLIC BLOOD PRESSURE: 57 MMHG | SYSTOLIC BLOOD PRESSURE: 106 MMHG

## 2019-12-11 VITALS — DIASTOLIC BLOOD PRESSURE: 36 MMHG | SYSTOLIC BLOOD PRESSURE: 133 MMHG

## 2019-12-11 VITALS — SYSTOLIC BLOOD PRESSURE: 130 MMHG | DIASTOLIC BLOOD PRESSURE: 58 MMHG

## 2019-12-11 VITALS — SYSTOLIC BLOOD PRESSURE: 105 MMHG | DIASTOLIC BLOOD PRESSURE: 30 MMHG

## 2019-12-11 VITALS — SYSTOLIC BLOOD PRESSURE: 118 MMHG | DIASTOLIC BLOOD PRESSURE: 58 MMHG

## 2019-12-11 VITALS — DIASTOLIC BLOOD PRESSURE: 52 MMHG | SYSTOLIC BLOOD PRESSURE: 95 MMHG

## 2019-12-11 VITALS — SYSTOLIC BLOOD PRESSURE: 109 MMHG | DIASTOLIC BLOOD PRESSURE: 60 MMHG

## 2019-12-11 VITALS — SYSTOLIC BLOOD PRESSURE: 102 MMHG | DIASTOLIC BLOOD PRESSURE: 48 MMHG

## 2019-12-11 VITALS — SYSTOLIC BLOOD PRESSURE: 100 MMHG | DIASTOLIC BLOOD PRESSURE: 30 MMHG

## 2019-12-11 VITALS — SYSTOLIC BLOOD PRESSURE: 123 MMHG | DIASTOLIC BLOOD PRESSURE: 68 MMHG

## 2019-12-11 VITALS — DIASTOLIC BLOOD PRESSURE: 61 MMHG | SYSTOLIC BLOOD PRESSURE: 110 MMHG

## 2019-12-11 VITALS — DIASTOLIC BLOOD PRESSURE: 47 MMHG | SYSTOLIC BLOOD PRESSURE: 101 MMHG

## 2019-12-11 VITALS — DIASTOLIC BLOOD PRESSURE: 61 MMHG | SYSTOLIC BLOOD PRESSURE: 122 MMHG

## 2019-12-11 VITALS — SYSTOLIC BLOOD PRESSURE: 106 MMHG | DIASTOLIC BLOOD PRESSURE: 62 MMHG

## 2019-12-11 VITALS — SYSTOLIC BLOOD PRESSURE: 112 MMHG | DIASTOLIC BLOOD PRESSURE: 66 MMHG

## 2019-12-11 VITALS — DIASTOLIC BLOOD PRESSURE: 56 MMHG | SYSTOLIC BLOOD PRESSURE: 99 MMHG

## 2019-12-11 VITALS — DIASTOLIC BLOOD PRESSURE: 32 MMHG | SYSTOLIC BLOOD PRESSURE: 128 MMHG

## 2019-12-11 VITALS — DIASTOLIC BLOOD PRESSURE: 65 MMHG | SYSTOLIC BLOOD PRESSURE: 116 MMHG

## 2019-12-11 VITALS — SYSTOLIC BLOOD PRESSURE: 116 MMHG | DIASTOLIC BLOOD PRESSURE: 34 MMHG

## 2019-12-11 VITALS — SYSTOLIC BLOOD PRESSURE: 117 MMHG | DIASTOLIC BLOOD PRESSURE: 32 MMHG

## 2019-12-11 VITALS — DIASTOLIC BLOOD PRESSURE: 57 MMHG | SYSTOLIC BLOOD PRESSURE: 101 MMHG

## 2019-12-11 VITALS — DIASTOLIC BLOOD PRESSURE: 63 MMHG | SYSTOLIC BLOOD PRESSURE: 111 MMHG

## 2019-12-11 LAB
ALBUMIN SERPL BCP-MCNC: 3.8 G/DL (ref 3.4–5)
ALBUMIN SERPL BCP-MCNC: 4.1 G/DL (ref 3.4–5)
ALBUMIN SERPL BCP-MCNC: 4.2 G/DL (ref 3.4–5)
ALBUMIN SERPL BCP-MCNC: 4.6 G/DL (ref 3.4–5)
ALBUMIN/GLOB SERPL: 1.4 {RATIO} (ref 1.1–1.5)
ALP SERPL-CCNC: 231 IU/L (ref 46–116)
ALT SERPL W P-5'-P-CCNC: 100 U/L (ref 12–78)
ANION GAP SERPL CALCULATED.3IONS-SCNC: 6 MMOL/L (ref 8–16)
ANION GAP SERPL CALCULATED.3IONS-SCNC: 7 MMOL/L (ref 8–16)
ANION GAP SERPL CALCULATED.3IONS-SCNC: 7 MMOL/L (ref 8–16)
ANION GAP SERPL CALCULATED.3IONS-SCNC: 9 MMOL/L (ref 8–16)
ANISOCYTOSIS BLD QL SMEAR: (no result)
ANISOCYTOSIS BLD QL SMEAR: (no result)
APTT PPP: 29 SECONDS (ref 22–32)
AST SERPL W P-5'-P-CCNC: 140 U/L (ref 10–37)
BASE EXCESS BLDA CALC-SCNC: 2.1 MMOL/L (ref -2–3)
BASOPHILS # BLD AUTO: 0 X10'3 (ref 0–0.2)
BASOPHILS # BLD AUTO: 0.1 X10'3 (ref 0–0.2)
BASOPHILS NFR BLD AUTO: 0.1 % (ref 0–1)
BASOPHILS NFR BLD AUTO: 0.4 % (ref 0–1)
BILIRUB SERPL-MCNC: 2.3 MG/DL (ref 0.1–1)
BODY TEMPERATURE: 37.7
BUN SERPL-MCNC: 38 MG/DL (ref 7–18)
BUN SERPL-MCNC: 50 MG/DL (ref 7–18)
BUN SERPL-MCNC: 51 MG/DL (ref 7–18)
BUN SERPL-MCNC: 51 MG/DL (ref 7–18)
BUN/CREAT SERPL: 19.5 (ref 5.4–32)
BUN/CREAT SERPL: 21.4 (ref 5.4–32)
BUN/CREAT SERPL: 23.7 (ref 5.4–32)
BUN/CREAT SERPL: 24.3 (ref 5.4–32)
BURR CELLS BLD QL SMEAR: (no result)
CALCIUM SERPL-MCNC: 8.3 MG/DL (ref 8.5–10.1)
CALCIUM SERPL-MCNC: 8.5 MG/DL (ref 8.5–10.1)
CHLORIDE SERPL-SCNC: 101 MMOL/L (ref 99–107)
CHLORIDE SERPL-SCNC: 102 MMOL/L (ref 99–107)
CHLORIDE SERPL-SCNC: 103 MMOL/L (ref 99–107)
CHLORIDE SERPL-SCNC: 104 MMOL/L (ref 99–107)
CO2 SERPL-SCNC: 26.7 MMOL/L (ref 24–32)
CO2 SERPL-SCNC: 26.7 MMOL/L (ref 24–32)
CO2 SERPL-SCNC: 27.5 MMOL/L (ref 24–32)
CO2 SERPL-SCNC: 27.9 MMOL/L (ref 24–32)
COHGB MFR BLDA: 0.3 % (ref 0.5–1.5)
CREAT SERPL-MCNC: 1.95 MG/DL (ref 0.6–1.1)
CREAT SERPL-MCNC: 2.06 MG/DL (ref 0.6–1.1)
CREAT SERPL-MCNC: 2.15 MG/DL (ref 0.6–1.1)
CREAT SERPL-MCNC: 2.38 MG/DL (ref 0.6–1.1)
EOSINOPHIL # BLD AUTO: 0 X10'3 (ref 0–0.9)
EOSINOPHIL NFR BLD AUTO: 0 % (ref 0–6)
ERYTHROCYTE [DISTWIDTH] IN BLOOD BY AUTOMATED COUNT: 15.5 % (ref 11.5–14.5)
ERYTHROCYTE [DISTWIDTH] IN BLOOD BY AUTOMATED COUNT: 15.7 % (ref 11.5–14.5)
ERYTHROCYTE [DISTWIDTH] IN BLOOD BY AUTOMATED COUNT: 15.8 % (ref 11.5–14.5)
ERYTHROCYTE [DISTWIDTH] IN BLOOD BY AUTOMATED COUNT: 16 % (ref 11.5–14.5)
GFR SERPL CREATININE-BSD FRML MDRD: 27 ML/MIN
GFR SERPL CREATININE-BSD FRML MDRD: 30 ML/MIN
GFR SERPL CREATININE-BSD FRML MDRD: 32 ML/MIN
GFR SERPL CREATININE-BSD FRML MDRD: 34 ML/MIN
GLUCOSE SERPL-MCNC: 124 MG/DL (ref 70–104)
GLUCOSE SERPL-MCNC: 142 MG/DL (ref 70–104)
GLUCOSE SERPL-MCNC: 163 MG/DL (ref 70–104)
GLUCOSE SERPL-MCNC: 171 MG/DL (ref 70–104)
HCO3 BLDA-SCNC: 25.8 MMOL/L (ref 22–26)
HCT VFR BLD AUTO: 23.6 % (ref 42–52)
HCT VFR BLD AUTO: 24.7 % (ref 42–52)
HCT VFR BLD AUTO: 25.2 % (ref 42–52)
HCT VFR BLD AUTO: 25.5 % (ref 42–52)
HGB BLD-MCNC: 7.8 G/DL (ref 14–17.9)
HGB BLD-MCNC: 8.2 G/DL (ref 14–17.9)
HGB BLD-MCNC: 8.4 G/DL (ref 14–17.9)
HGB BLD-MCNC: 8.5 G/DL (ref 14–17.9)
HGB BLDA-MCNC: 8.7 G/DL (ref 14–17.9)
HYPOCHROMIA BLD QL SMEAR: (no result)
LG PLATELETS BLD QL SMEAR: (no result)
LYMPHOCYTES # BLD AUTO: 1.2 X10'3 (ref 1.1–4.8)
LYMPHOCYTES # BLD AUTO: 1.4 X10'3 (ref 1.1–4.8)
LYMPHOCYTES # BLD AUTO: 1.6 X10'3 (ref 1.1–4.8)
LYMPHOCYTES # BLD AUTO: 1.6 X10'3 (ref 1.1–4.8)
LYMPHOCYTES NFR BLD AUTO: 4.9 % (ref 21–51)
LYMPHOCYTES NFR BLD AUTO: 5.5 % (ref 21–51)
LYMPHOCYTES NFR BLD AUTO: 6.6 % (ref 21–51)
LYMPHOCYTES NFR BLD AUTO: 6.8 % (ref 21–51)
LYMPHOCYTES NFR BLD MANUAL: 10 % (ref 21–51)
LYMPHOCYTES NFR BLD MANUAL: 2 % (ref 21–51)
LYMPHOCYTES NFR BLD MANUAL: 5 % (ref 21–51)
LYMPHOCYTES NFR BLD MANUAL: 9 % (ref 21–51)
MAGNESIUM SERPL-MCNC: 2.6 MG/DL (ref 1.5–2.4)
MAGNESIUM SERPL-MCNC: 2.7 MG/DL (ref 1.5–2.4)
MAGNESIUM SERPL-MCNC: 3 MG/DL (ref 1.5–2.4)
MCH RBC QN AUTO: 30.9 PG (ref 27–31)
MCH RBC QN AUTO: 31.3 PG (ref 27–31)
MCHC RBC AUTO-ENTMCNC: 33.2 G/DL (ref 33–36.5)
MCHC RBC AUTO-ENTMCNC: 33.3 G/DL (ref 33–36.5)
MCHC RBC AUTO-ENTMCNC: 33.4 G/DL (ref 33–36.5)
MCHC RBC AUTO-ENTMCNC: 33.5 G/DL (ref 33–36.5)
MCV RBC AUTO: 92.8 FL (ref 78–98)
MCV RBC AUTO: 93.3 FL (ref 78–98)
MCV RBC AUTO: 93.7 FL (ref 78–98)
MCV RBC AUTO: 94.5 FL (ref 78–98)
METAMYELOCYTES NFR BLD MANUAL: 1 % (ref 0–0)
METAMYELOCYTES NFR BLD MANUAL: 1 % (ref 0–0)
METAMYELOCYTES NFR BLD MANUAL: 3 % (ref 0–0)
METAMYELOCYTES NFR BLD MANUAL: 3 % (ref 0–0)
METHGB MFR BLDA: 0.3 % (ref 0.3–1.12)
MONOCYTES # BLD AUTO: 1.2 X10'3 (ref 0–0.9)
MONOCYTES # BLD AUTO: 1.7 X10'3 (ref 0–0.9)
MONOCYTES # BLD AUTO: 2 X10'3 (ref 0–0.9)
MONOCYTES # BLD AUTO: 2 X10'3 (ref 0–0.9)
MONOCYTES NFR BLD AUTO: 5.1 % (ref 2–12)
MONOCYTES NFR BLD AUTO: 6.7 % (ref 2–12)
MONOCYTES NFR BLD AUTO: 8.3 % (ref 2–12)
MONOCYTES NFR BLD AUTO: 8.5 % (ref 2–12)
MONOCYTES NFR BLD MANUAL: 10 % (ref 2–12)
MONOCYTES NFR BLD MANUAL: 4 % (ref 2–12)
MONOCYTES NFR BLD MANUAL: 4 % (ref 2–12)
MONOCYTES NFR BLD MANUAL: 8 % (ref 2–12)
NEUTROPHILS # BLD AUTO: 19.6 X10'3 (ref 1.8–7.7)
NEUTROPHILS # BLD AUTO: 20.6 X10'3 (ref 1.8–7.7)
NEUTROPHILS # BLD AUTO: 21.8 X10'3 (ref 1.8–7.7)
NEUTROPHILS # BLD AUTO: 22.7 X10'3 (ref 1.8–7.7)
NEUTROPHILS NFR BLD AUTO: 84.6 % (ref 42–75)
NEUTROPHILS NFR BLD AUTO: 85 % (ref 42–75)
NEUTROPHILS NFR BLD AUTO: 88.3 % (ref 42–75)
NEUTROPHILS NFR BLD AUTO: 89 % (ref 42–75)
NEUTS BAND # BLD MANUAL: 1 % (ref 0–10)
NEUTS BAND # BLD MANUAL: 4 % (ref 0–10)
NEUTS SEG NFR BLD MANUAL: 75 % (ref 42–75)
NEUTS SEG NFR BLD MANUAL: 81 % (ref 42–75)
NEUTS SEG NFR BLD MANUAL: 82 % (ref 42–75)
NEUTS SEG NFR BLD MANUAL: 89 % (ref 42–75)
NRBC BLD MANUAL-RTO: 10 /100WBC (ref 0–0)
NRBC BLD MANUAL-RTO: 12 /100WBC (ref 0–0)
NRBC BLD MANUAL-RTO: 18 /100WBC (ref 0–0)
NRBC BLD MANUAL-RTO: 24 /100WBC (ref 0–0)
O2/TOTAL GAS SETTING VFR VENT: 30 MMHG/%
OXYHGB MFR BLDA: 95.5 % (ref 94–100)
PCO2 TEMP ADJ BLDA: 37.3 MMHG (ref 35–45)
PEEP RESPIRATORY: 5 CM H2O
PH TEMP ADJ BLDA: 7.46 [PH] (ref 7.35–7.45)
PHOSPHATE SERPL-MCNC: 3.1 MG/DL (ref 2.3–4.5)
PHOSPHATE SERPL-MCNC: 3.2 MG/DL (ref 2.3–4.5)
PHOSPHATE SERPL-MCNC: 3.3 MG/DL (ref 2.3–4.5)
PHOSPHATE SERPL-MCNC: 3.5 MG/DL (ref 2.3–4.5)
PLATELET # BLD AUTO: 167 X10'3 (ref 140–440)
PLATELET # BLD AUTO: 184 X10'3 (ref 140–440)
PLATELET # BLD AUTO: 187 X10'3 (ref 140–440)
PLATELET # BLD AUTO: 193 X10'3 (ref 140–440)
PLATELET BLD QL SMEAR: NORMAL
PMV BLD AUTO: 8.1 FL (ref 7.4–10.4)
PMV BLD AUTO: 8.2 FL (ref 7.4–10.4)
PMV BLD AUTO: 8.3 FL (ref 7.4–10.4)
PMV BLD AUTO: 8.4 FL (ref 7.4–10.4)
PO2 TEMP ADJ BLD: 86.4 MMHG (ref 83–108)
POLYCHROMASIA BLD QL SMEAR: (no result)
POTASSIUM SERPL-SCNC: 4.8 MMOL/L (ref 3.5–5.1)
POTASSIUM SERPL-SCNC: 4.9 MMOL/L (ref 3.5–5.1)
POTASSIUM SERPL-SCNC: 5.1 MMOL/L (ref 3.5–5.1)
POTASSIUM SERPL-SCNC: 5.3 MMOL/L (ref 3.5–5.1)
PROT SERPL-MCNC: 6.5 G/DL (ref 6.4–8.2)
RBC # BLD AUTO: 2.5 X10'6 (ref 4.7–6.1)
RBC # BLD AUTO: 2.64 X10'6 (ref 4.7–6.1)
RBC # BLD AUTO: 2.72 X10'6 (ref 4.7–6.1)
RBC # BLD AUTO: 2.73 X10'6 (ref 4.7–6.1)
RBC MORPH BLD: (no result)
RESP RATE 1H: 18 B/MIN
RESP RATE RESTING: 18 B/MIN
SAO2 % BLDA: 96.1 % (ref 95–98)
SODIUM SERPL-SCNC: 136 MMOL/L (ref 135–145)
SODIUM SERPL-SCNC: 137 MMOL/L (ref 135–145)
SODIUM SERPL-SCNC: 137 MMOL/L (ref 135–145)
SODIUM SERPL-SCNC: 138 MMOL/L (ref 135–145)
SPONT VT COMPRES GAS VOL SET UNCOR VENT: 500 ML
TOTAL CELLS COUNTED FLD: 100
VOL.EXP/M/BW ON VENT: 9 L/MIN
WBC # BLD AUTO: 23.2 X10'3 (ref 4.5–11)
WBC # BLD AUTO: 24.3 X10'3 (ref 4.5–11)
WBC # BLD AUTO: 24.5 X10'3 (ref 4.5–11)
WBC # BLD AUTO: 25.7 X10'3 (ref 4.5–11)

## 2019-12-11 PROCEDURE — 5A1D90Z PERFORMANCE OF URINARY FILTRATION, CONTINUOUS, GREATER THAN 18 HOURS PER DAY: ICD-10-PCS | Performed by: INTERNAL MEDICINE

## 2019-12-11 RX ADMIN — MINERAL OIL, PETROLATUM SCH INCH: 425; 568 OINTMENT OPHTHALMIC at 14:34

## 2019-12-11 RX ADMIN — CALCIUM CHLORIDE, MAGNESIUM CHLORIDE, DEXTROSE MONOHYDRATE, LACTIC ACID, SODIUM CHLORIDE, SODIUM BICARBONATE AND POTASSIUM CHLORIDE SCH MLS/HR: 5.15; 2.03; 22; 5.4; 6.46; 3.09; .157 INJECTION INTRAVENOUS at 15:39

## 2019-12-11 RX ADMIN — CALCIUM CHLORIDE, MAGNESIUM CHLORIDE, DEXTROSE MONOHYDRATE, LACTIC ACID, SODIUM CHLORIDE, SODIUM BICARBONATE AND POTASSIUM CHLORIDE SCH MLS/HR: 5.15; 2.03; 22; 5.4; 6.46; 3.09; .157 INJECTION INTRAVENOUS at 12:51

## 2019-12-11 RX ADMIN — IPRATROPIUM BROMIDE AND ALBUTEROL SULFATE SCH ML: .5; 3 SOLUTION RESPIRATORY (INHALATION) at 02:40

## 2019-12-11 RX ADMIN — DOCUSATE SODIUM SCH MG: 50 LIQUID ORAL at 07:30

## 2019-12-11 RX ADMIN — Medication SCH MLS/HR: at 04:00

## 2019-12-11 RX ADMIN — IPRATROPIUM BROMIDE AND ALBUTEROL SULFATE SCH ML: .5; 3 SOLUTION RESPIRATORY (INHALATION) at 15:38

## 2019-12-11 RX ADMIN — Medication SCH MLS/HR: at 02:45

## 2019-12-11 RX ADMIN — CALCIUM CHLORIDE, MAGNESIUM CHLORIDE, DEXTROSE MONOHYDRATE, LACTIC ACID, SODIUM CHLORIDE, SODIUM BICARBONATE AND POTASSIUM CHLORIDE SCH MLS/HR: 5.15; 2.03; 22; 5.4; 6.46; 3.09; .157 INJECTION INTRAVENOUS at 11:48

## 2019-12-11 RX ADMIN — Medication SCH MLS/HR: at 01:30

## 2019-12-11 RX ADMIN — DEXTROSE SCH MLS/HR: 5 SOLUTION INTRAVENOUS at 14:22

## 2019-12-11 RX ADMIN — METOCLOPRAMIDE SCH MG: 5 INJECTION, SOLUTION INTRAMUSCULAR; INTRAVENOUS at 01:44

## 2019-12-11 RX ADMIN — CALCIUM CHLORIDE, MAGNESIUM CHLORIDE, DEXTROSE MONOHYDRATE, LACTIC ACID, SODIUM CHLORIDE, SODIUM BICARBONATE AND POTASSIUM CHLORIDE SCH MLS/HR: 5.15; 2.03; 22; 5.4; 6.46; 3.09; .157 INJECTION INTRAVENOUS at 22:42

## 2019-12-11 RX ADMIN — DEXTROSE SCH MLS/HR: 5 SOLUTION INTRAVENOUS at 19:10

## 2019-12-11 RX ADMIN — METHYLPREDNISOLONE SODIUM SUCCINATE SCH MG: 40 INJECTION, POWDER, FOR SOLUTION INTRAMUSCULAR; INTRAVENOUS at 07:30

## 2019-12-11 RX ADMIN — Medication SCH MLS/HR: at 11:56

## 2019-12-11 RX ADMIN — METOCLOPRAMIDE SCH MG: 5 INJECTION, SOLUTION INTRAMUSCULAR; INTRAVENOUS at 19:10

## 2019-12-11 RX ADMIN — DOCUSATE SODIUM SCH MG: 50 LIQUID ORAL at 19:10

## 2019-12-11 RX ADMIN — IPRATROPIUM BROMIDE AND ALBUTEROL SULFATE SCH ML: .5; 3 SOLUTION RESPIRATORY (INHALATION) at 11:14

## 2019-12-11 RX ADMIN — CALCIUM CHLORIDE, MAGNESIUM CHLORIDE, DEXTROSE MONOHYDRATE, LACTIC ACID, SODIUM CHLORIDE, SODIUM BICARBONATE AND POTASSIUM CHLORIDE SCH MLS/HR: 5.15; 2.03; 22; 5.4; 6.46; 3.09; .157 INJECTION INTRAVENOUS at 18:45

## 2019-12-11 RX ADMIN — METOCLOPRAMIDE SCH MG: 5 INJECTION, SOLUTION INTRAMUSCULAR; INTRAVENOUS at 07:30

## 2019-12-11 RX ADMIN — INSULIN HUMAN SCH MLS/HR: 100 INJECTION, SOLUTION PARENTERAL at 09:53

## 2019-12-11 RX ADMIN — CEFEPIME HYDROCHLORIDE SCH MLS/HR: 1 INJECTION, SOLUTION INTRAVENOUS at 07:30

## 2019-12-11 RX ADMIN — VANCOMYCIN HYDROCHLORIDE SCH MLS/HR: 1.25 INJECTION, POWDER, LYOPHILIZED, FOR SOLUTION INTRAVENOUS at 12:34

## 2019-12-11 RX ADMIN — MINERAL OIL, PETROLATUM SCH INCH: 425; 568 OINTMENT OPHTHALMIC at 19:10

## 2019-12-11 RX ADMIN — METHYLPREDNISOLONE SODIUM SUCCINATE SCH MG: 40 INJECTION, POWDER, FOR SOLUTION INTRAMUSCULAR; INTRAVENOUS at 14:21

## 2019-12-11 RX ADMIN — IPRATROPIUM BROMIDE AND ALBUTEROL SULFATE SCH ML: .5; 3 SOLUTION RESPIRATORY (INHALATION) at 06:49

## 2019-12-11 RX ADMIN — MINERAL OIL, PETROLATUM SCH INCH: 425; 568 OINTMENT OPHTHALMIC at 07:31

## 2019-12-11 RX ADMIN — MIDODRINE HYDROCHLORIDE SCH MG: 5 TABLET ORAL at 23:17

## 2019-12-11 RX ADMIN — HEPARIN SODIUM SCH UNIT: 5000 INJECTION, SOLUTION INTRAVENOUS; SUBCUTANEOUS at 19:11

## 2019-12-11 RX ADMIN — MINERAL OIL, PETROLATUM SCH INCH: 425; 568 OINTMENT OPHTHALMIC at 01:44

## 2019-12-11 RX ADMIN — DOBUTAMINE IN DEXTROSE SCH MLS/HR: 200 INJECTION, SOLUTION INTRAVENOUS at 01:44

## 2019-12-11 RX ADMIN — Medication SCH MMU: at 19:10

## 2019-12-11 RX ADMIN — DEXMEDETOMIDINE HYDROCHLORIDE SCH MLS/HR: 4 INJECTION, SOLUTION INTRAVENOUS at 14:33

## 2019-12-11 RX ADMIN — METHYLPREDNISOLONE SODIUM SUCCINATE SCH MG: 40 INJECTION, POWDER, FOR SOLUTION INTRAMUSCULAR; INTRAVENOUS at 01:44

## 2019-12-11 RX ADMIN — Medication SCH MLS/HR: at 00:15

## 2019-12-11 RX ADMIN — IPRATROPIUM BROMIDE AND ALBUTEROL SULFATE SCH ML: .5; 3 SOLUTION RESPIRATORY (INHALATION) at 22:42

## 2019-12-11 RX ADMIN — CALCIUM CHLORIDE, MAGNESIUM CHLORIDE, DEXTROSE MONOHYDRATE, LACTIC ACID, SODIUM CHLORIDE, SODIUM BICARBONATE AND POTASSIUM CHLORIDE SCH MLS/HR: 5.15; 2.03; 22; 5.4; 6.46; 3.09; .157 INJECTION INTRAVENOUS at 14:14

## 2019-12-11 RX ADMIN — METHYLPREDNISOLONE SODIUM SUCCINATE SCH MG: 40 INJECTION, POWDER, FOR SOLUTION INTRAMUSCULAR; INTRAVENOUS at 19:10

## 2019-12-11 RX ADMIN — IPRATROPIUM BROMIDE AND ALBUTEROL SULFATE SCH ML: .5; 3 SOLUTION RESPIRATORY (INHALATION) at 18:51

## 2019-12-11 RX ADMIN — Medication SCH MLS/HR: at 05:15

## 2019-12-11 RX ADMIN — CALCIUM CHLORIDE, MAGNESIUM CHLORIDE, DEXTROSE MONOHYDRATE, LACTIC ACID, SODIUM CHLORIDE, SODIUM BICARBONATE AND POTASSIUM CHLORIDE SCH MLS/HR: 5.15; 2.03; 22; 5.4; 6.46; 3.09; .157 INJECTION INTRAVENOUS at 16:15

## 2019-12-11 RX ADMIN — CALCIUM CHLORIDE, MAGNESIUM CHLORIDE, DEXTROSE MONOHYDRATE, LACTIC ACID, SODIUM CHLORIDE, SODIUM BICARBONATE AND POTASSIUM CHLORIDE SCH MLS/HR: 5.15; 2.03; 22; 5.4; 6.46; 3.09; .157 INJECTION INTRAVENOUS at 18:07

## 2019-12-11 RX ADMIN — DEXTROSE SCH MLS/HR: 5 SOLUTION INTRAVENOUS at 07:29

## 2019-12-11 RX ADMIN — INSULIN HUMAN SCH MLS/HR: 100 INJECTION, SOLUTION PARENTERAL at 14:21

## 2019-12-11 RX ADMIN — MIDODRINE HYDROCHLORIDE SCH MG: 5 TABLET ORAL at 07:31

## 2019-12-11 RX ADMIN — Medication SCH MLS/HR: at 18:08

## 2019-12-11 RX ADMIN — HEPARIN SODIUM SCH UNIT: 5000 INJECTION, SOLUTION INTRAVENOUS; SUBCUTANEOUS at 07:30

## 2019-12-11 RX ADMIN — DEXTROSE SCH MLS/HR: 5 SOLUTION INTRAVENOUS at 01:44

## 2019-12-11 RX ADMIN — MIDODRINE HYDROCHLORIDE SCH MG: 5 TABLET ORAL at 16:21

## 2019-12-11 RX ADMIN — METOCLOPRAMIDE SCH MG: 5 INJECTION, SOLUTION INTRAMUSCULAR; INTRAVENOUS at 14:21

## 2019-12-11 RX ADMIN — Medication SCH MMU: at 07:31

## 2019-12-11 RX ADMIN — DEXMEDETOMIDINE HYDROCHLORIDE SCH MLS/HR: 4 INJECTION, SOLUTION INTRAVENOUS at 10:23

## 2019-12-11 RX ADMIN — CALCIUM CHLORIDE, MAGNESIUM CHLORIDE, DEXTROSE MONOHYDRATE, LACTIC ACID, SODIUM CHLORIDE, SODIUM BICARBONATE AND POTASSIUM CHLORIDE SCH MLS/HR: 5.15; 2.03; 22; 5.4; 6.46; 3.09; .157 INJECTION INTRAVENOUS at 10:00

## 2019-12-11 NOTE — NUR
pt is intubated, alert, not following commands. pt has ccvh running,goal is to get 50cc off 
per hour. lung sounds are course and diminished throughout, equal bilateral chest rise and 
fall. bowel sounds are present in all 4 quadrants but are hypoactive. no bm present pt is on 
levophed and an insulin drip. heels elbows coccyx intact blanchable free from breakdown. 
vital signs stable. will continue to monitor.

## 2019-12-11 NOTE — NUR
Patient in room ICU 2039. I have received report from ksenia ortiz   and had the opportunity to 
ask questions and assume patient care.

## 2019-12-12 VITALS — DIASTOLIC BLOOD PRESSURE: 58 MMHG | SYSTOLIC BLOOD PRESSURE: 115 MMHG

## 2019-12-12 VITALS — SYSTOLIC BLOOD PRESSURE: 107 MMHG | DIASTOLIC BLOOD PRESSURE: 51 MMHG

## 2019-12-12 VITALS — SYSTOLIC BLOOD PRESSURE: 111 MMHG | DIASTOLIC BLOOD PRESSURE: 42 MMHG

## 2019-12-12 VITALS — DIASTOLIC BLOOD PRESSURE: 48 MMHG | SYSTOLIC BLOOD PRESSURE: 99 MMHG

## 2019-12-12 VITALS — DIASTOLIC BLOOD PRESSURE: 54 MMHG | SYSTOLIC BLOOD PRESSURE: 100 MMHG

## 2019-12-12 VITALS — SYSTOLIC BLOOD PRESSURE: 92 MMHG | DIASTOLIC BLOOD PRESSURE: 50 MMHG

## 2019-12-12 VITALS — DIASTOLIC BLOOD PRESSURE: 55 MMHG | SYSTOLIC BLOOD PRESSURE: 103 MMHG

## 2019-12-12 VITALS — DIASTOLIC BLOOD PRESSURE: 59 MMHG | SYSTOLIC BLOOD PRESSURE: 101 MMHG

## 2019-12-12 VITALS — SYSTOLIC BLOOD PRESSURE: 98 MMHG | DIASTOLIC BLOOD PRESSURE: 45 MMHG

## 2019-12-12 VITALS — SYSTOLIC BLOOD PRESSURE: 104 MMHG | DIASTOLIC BLOOD PRESSURE: 53 MMHG

## 2019-12-12 VITALS — SYSTOLIC BLOOD PRESSURE: 107 MMHG | DIASTOLIC BLOOD PRESSURE: 53 MMHG

## 2019-12-12 VITALS — DIASTOLIC BLOOD PRESSURE: 62 MMHG | SYSTOLIC BLOOD PRESSURE: 107 MMHG

## 2019-12-12 VITALS — SYSTOLIC BLOOD PRESSURE: 117 MMHG | DIASTOLIC BLOOD PRESSURE: 63 MMHG

## 2019-12-12 VITALS — DIASTOLIC BLOOD PRESSURE: 52 MMHG | SYSTOLIC BLOOD PRESSURE: 100 MMHG

## 2019-12-12 VITALS — SYSTOLIC BLOOD PRESSURE: 108 MMHG | DIASTOLIC BLOOD PRESSURE: 55 MMHG

## 2019-12-12 VITALS — SYSTOLIC BLOOD PRESSURE: 105 MMHG | DIASTOLIC BLOOD PRESSURE: 59 MMHG

## 2019-12-12 VITALS — SYSTOLIC BLOOD PRESSURE: 90 MMHG | DIASTOLIC BLOOD PRESSURE: 45 MMHG

## 2019-12-12 VITALS — DIASTOLIC BLOOD PRESSURE: 56 MMHG | SYSTOLIC BLOOD PRESSURE: 103 MMHG

## 2019-12-12 VITALS — DIASTOLIC BLOOD PRESSURE: 58 MMHG | SYSTOLIC BLOOD PRESSURE: 106 MMHG

## 2019-12-12 VITALS — DIASTOLIC BLOOD PRESSURE: 51 MMHG | SYSTOLIC BLOOD PRESSURE: 95 MMHG

## 2019-12-12 VITALS — SYSTOLIC BLOOD PRESSURE: 98 MMHG | DIASTOLIC BLOOD PRESSURE: 55 MMHG

## 2019-12-12 VITALS — DIASTOLIC BLOOD PRESSURE: 53 MMHG | SYSTOLIC BLOOD PRESSURE: 105 MMHG

## 2019-12-12 VITALS — DIASTOLIC BLOOD PRESSURE: 52 MMHG | SYSTOLIC BLOOD PRESSURE: 101 MMHG

## 2019-12-12 VITALS — DIASTOLIC BLOOD PRESSURE: 44 MMHG | SYSTOLIC BLOOD PRESSURE: 113 MMHG

## 2019-12-12 VITALS — DIASTOLIC BLOOD PRESSURE: 47 MMHG | SYSTOLIC BLOOD PRESSURE: 96 MMHG

## 2019-12-12 VITALS — DIASTOLIC BLOOD PRESSURE: 55 MMHG | SYSTOLIC BLOOD PRESSURE: 108 MMHG

## 2019-12-12 VITALS — DIASTOLIC BLOOD PRESSURE: 51 MMHG | SYSTOLIC BLOOD PRESSURE: 113 MMHG

## 2019-12-12 VITALS — DIASTOLIC BLOOD PRESSURE: 51 MMHG | SYSTOLIC BLOOD PRESSURE: 99 MMHG

## 2019-12-12 VITALS — DIASTOLIC BLOOD PRESSURE: 57 MMHG | SYSTOLIC BLOOD PRESSURE: 100 MMHG

## 2019-12-12 VITALS — DIASTOLIC BLOOD PRESSURE: 50 MMHG | SYSTOLIC BLOOD PRESSURE: 94 MMHG

## 2019-12-12 LAB
ALBUMIN SERPL BCP-MCNC: 3.8 G/DL (ref 3.4–5)
ALBUMIN SERPL BCP-MCNC: 4 G/DL (ref 3.4–5)
ALBUMIN SERPL BCP-MCNC: 4.4 G/DL (ref 3.4–5)
ALBUMIN SERPL BCP-MCNC: 4.4 G/DL (ref 3.4–5)
ALBUMIN/GLOB SERPL: 2 {RATIO} (ref 1.1–1.5)
ALP SERPL-CCNC: 185 IU/L (ref 46–116)
ALT SERPL W P-5'-P-CCNC: 234 U/L (ref 12–78)
ANION GAP SERPL CALCULATED.3IONS-SCNC: 10 MMOL/L (ref 8–16)
ANION GAP SERPL CALCULATED.3IONS-SCNC: 7 MMOL/L (ref 8–16)
ANION GAP SERPL CALCULATED.3IONS-SCNC: 8 MMOL/L (ref 8–16)
ANION GAP SERPL CALCULATED.3IONS-SCNC: 8 MMOL/L (ref 8–16)
APTT PPP: 31 SECONDS (ref 22–32)
AST SERPL W P-5'-P-CCNC: 285 U/L (ref 10–37)
BASE EXCESS BLDA CALC-SCNC: 2.6 MMOL/L (ref -2–3)
BASOPHILS # BLD AUTO: 0 X10'3 (ref 0–0.2)
BASOPHILS # BLD AUTO: 0.1 X10'3 (ref 0–0.2)
BASOPHILS NFR BLD AUTO: 0.1 % (ref 0–1)
BASOPHILS NFR BLD AUTO: 0.2 % (ref 0–1)
BILIRUB SERPL-MCNC: 3.1 MG/DL (ref 0.1–1)
BODY TEMPERATURE: 37.1
BUN SERPL-MCNC: 44 MG/DL (ref 7–18)
BUN SERPL-MCNC: 46 MG/DL (ref 7–18)
BUN SERPL-MCNC: 47 MG/DL (ref 7–18)
BUN SERPL-MCNC: 49 MG/DL (ref 7–18)
BUN/CREAT SERPL: 23.6 (ref 5.4–32)
BUN/CREAT SERPL: 23.8 (ref 5.4–32)
BUN/CREAT SERPL: 24.6 (ref 5.4–32)
BUN/CREAT SERPL: 25.6 (ref 5.4–32)
BURR CELLS BLD QL SMEAR: (no result)
CALCIUM SERPL-MCNC: 8.1 MG/DL (ref 8.5–10.1)
CALCIUM SERPL-MCNC: 8.3 MG/DL (ref 8.5–10.1)
CALCIUM SERPL-MCNC: 8.4 MG/DL (ref 8.5–10.1)
CALCIUM SERPL-MCNC: 8.6 MG/DL (ref 8.5–10.1)
CHLORIDE SERPL-SCNC: 101 MMOL/L (ref 99–107)
CHLORIDE SERPL-SCNC: 104 MMOL/L (ref 99–107)
CO2 SERPL-SCNC: 26 MMOL/L (ref 24–32)
CO2 SERPL-SCNC: 26.2 MMOL/L (ref 24–32)
CO2 SERPL-SCNC: 28 MMOL/L (ref 24–32)
CO2 SERPL-SCNC: 28.7 MMOL/L (ref 24–32)
COHGB MFR BLDA: 0.1 % (ref 0.5–1.5)
CREAT SERPL-MCNC: 1.79 MG/DL (ref 0.6–1.1)
CREAT SERPL-MCNC: 1.8 MG/DL (ref 0.6–1.1)
CREAT SERPL-MCNC: 1.99 MG/DL (ref 0.6–1.1)
CREAT SERPL-MCNC: 2.06 MG/DL (ref 0.6–1.1)
EOSINOPHIL # BLD AUTO: 0 X10'3 (ref 0–0.9)
EOSINOPHIL NFR BLD AUTO: 0 % (ref 0–6)
EOSINOPHIL NFR BLD AUTO: 0.1 % (ref 0–6)
ERYTHROCYTE [DISTWIDTH] IN BLOOD BY AUTOMATED COUNT: 14.9 % (ref 11.5–14.5)
ERYTHROCYTE [DISTWIDTH] IN BLOOD BY AUTOMATED COUNT: 15.4 % (ref 11.5–14.5)
ERYTHROCYTE [DISTWIDTH] IN BLOOD BY AUTOMATED COUNT: 15.8 % (ref 11.5–14.5)
ERYTHROCYTE [DISTWIDTH] IN BLOOD BY AUTOMATED COUNT: 16.1 % (ref 11.5–14.5)
ERYTHROCYTE [DISTWIDTH] IN BLOOD BY AUTOMATED COUNT: 16.2 % (ref 11.5–14.5)
ERYTHROCYTE [DISTWIDTH] IN BLOOD BY AUTOMATED COUNT: 16.3 % (ref 11.5–14.5)
GFR SERPL CREATININE-BSD FRML MDRD: 32 ML/MIN
GFR SERPL CREATININE-BSD FRML MDRD: 33 ML/MIN
GFR SERPL CREATININE-BSD FRML MDRD: 37 ML/MIN
GFR SERPL CREATININE-BSD FRML MDRD: 37 ML/MIN
GLUCOSE SERPL-MCNC: 135 MG/DL (ref 70–104)
GLUCOSE SERPL-MCNC: 154 MG/DL (ref 70–104)
GLUCOSE SERPL-MCNC: 163 MG/DL (ref 70–104)
GLUCOSE SERPL-MCNC: 203 MG/DL (ref 70–104)
HCO3 BLDA-SCNC: 25.4 MMOL/L (ref 22–26)
HCT VFR BLD AUTO: 19.4 % (ref 42–52)
HCT VFR BLD AUTO: 21.6 % (ref 42–52)
HCT VFR BLD AUTO: 23.3 % (ref 42–52)
HCT VFR BLD AUTO: 24.2 % (ref 42–52)
HCT VFR BLD AUTO: 26 % (ref 42–52)
HCT VFR BLD AUTO: 26.2 % (ref 42–52)
HGB BLD-MCNC: 6.4 G/DL (ref 14–17.9)
HGB BLD-MCNC: 7.2 G/DL (ref 14–17.9)
HGB BLD-MCNC: 7.8 G/DL (ref 14–17.9)
HGB BLD-MCNC: 8 G/DL (ref 14–17.9)
HGB BLD-MCNC: 8.6 G/DL (ref 14–17.9)
HGB BLD-MCNC: 8.7 G/DL (ref 14–17.9)
HGB BLDA-MCNC: 8.8 G/DL (ref 14–17.9)
HYPOCHROMIA BLD QL SMEAR: (no result)
HYPOCHROMIA BLD QL SMEAR: (no result)
LYMPHOCYTES # BLD AUTO: 1.2 X10'3 (ref 1.1–4.8)
LYMPHOCYTES # BLD AUTO: 1.3 X10'3 (ref 1.1–4.8)
LYMPHOCYTES # BLD AUTO: 1.5 X10'3 (ref 1.1–4.8)
LYMPHOCYTES # BLD AUTO: 1.5 X10'3 (ref 1.1–4.8)
LYMPHOCYTES # BLD AUTO: 1.6 X10'3 (ref 1.1–4.8)
LYMPHOCYTES # BLD AUTO: 1.6 X10'3 (ref 1.1–4.8)
LYMPHOCYTES NFR BLD AUTO: 5.5 % (ref 21–51)
LYMPHOCYTES NFR BLD AUTO: 5.5 % (ref 21–51)
LYMPHOCYTES NFR BLD AUTO: 5.7 % (ref 21–51)
LYMPHOCYTES NFR BLD AUTO: 6.1 % (ref 21–51)
LYMPHOCYTES NFR BLD AUTO: 6.1 % (ref 21–51)
LYMPHOCYTES NFR BLD AUTO: 6.4 % (ref 21–51)
LYMPHOCYTES NFR BLD MANUAL: 13 % (ref 21–51)
LYMPHOCYTES NFR BLD MANUAL: 7 % (ref 21–51)
MACROCYTES BLD QL SMEAR: (no result)
MAGNESIUM SERPL-MCNC: 2.1 MG/DL (ref 1.5–2.4)
MAGNESIUM SERPL-MCNC: 2.3 MG/DL (ref 1.5–2.4)
MCH RBC QN AUTO: 30 PG (ref 27–31)
MCH RBC QN AUTO: 30.5 PG (ref 27–31)
MCH RBC QN AUTO: 31.3 PG (ref 27–31)
MCH RBC QN AUTO: 31.4 PG (ref 27–31)
MCH RBC QN AUTO: 31.8 PG (ref 27–31)
MCH RBC QN AUTO: 32 PG (ref 27–31)
MCHC RBC AUTO-ENTMCNC: 33 G/DL (ref 33–36.5)
MCHC RBC AUTO-ENTMCNC: 33.1 G/DL (ref 33–36.5)
MCHC RBC AUTO-ENTMCNC: 33.1 G/DL (ref 33–36.5)
MCHC RBC AUTO-ENTMCNC: 33.3 G/DL (ref 33–36.5)
MCHC RBC AUTO-ENTMCNC: 33.4 G/DL (ref 33–36.5)
MCHC RBC AUTO-ENTMCNC: 33.6 G/DL (ref 33–36.5)
MCV RBC AUTO: 90.4 FL (ref 78–98)
MCV RBC AUTO: 91.3 FL (ref 78–98)
MCV RBC AUTO: 94.5 FL (ref 78–98)
MCV RBC AUTO: 94.9 FL (ref 78–98)
MCV RBC AUTO: 95.3 FL (ref 78–98)
MCV RBC AUTO: 96 FL (ref 78–98)
METAMYELOCYTES NFR BLD MANUAL: 1 % (ref 0–0)
METHGB MFR BLDA: 0.3 % (ref 0.3–1.12)
MONOCYTES # BLD AUTO: 1.2 X10'3 (ref 0–0.9)
MONOCYTES # BLD AUTO: 2 X10'3 (ref 0–0.9)
MONOCYTES # BLD AUTO: 2.3 X10'3 (ref 0–0.9)
MONOCYTES # BLD AUTO: 2.3 X10'3 (ref 0–0.9)
MONOCYTES # BLD AUTO: 2.6 X10'3 (ref 0–0.9)
MONOCYTES # BLD AUTO: 2.7 X10'3 (ref 0–0.9)
MONOCYTES NFR BLD AUTO: 6.2 % (ref 2–12)
MONOCYTES NFR BLD AUTO: 8.6 % (ref 2–12)
MONOCYTES NFR BLD AUTO: 9.1 % (ref 2–12)
MONOCYTES NFR BLD AUTO: 9.2 % (ref 2–12)
MONOCYTES NFR BLD AUTO: 9.3 % (ref 2–12)
MONOCYTES NFR BLD AUTO: 9.7 % (ref 2–12)
MONOCYTES NFR BLD MANUAL: 3 % (ref 2–12)
MONOCYTES NFR BLD MANUAL: 4 % (ref 2–12)
MYELOCYTES NFR BLD MANUAL: 1 % (ref 0–0)
NEUTROPHILS # BLD AUTO: 16.6 X10'3 (ref 1.8–7.7)
NEUTROPHILS # BLD AUTO: 19.7 X10'3 (ref 1.8–7.7)
NEUTROPHILS # BLD AUTO: 21.4 X10'3 (ref 1.8–7.7)
NEUTROPHILS # BLD AUTO: 21.6 X10'3 (ref 1.8–7.7)
NEUTROPHILS # BLD AUTO: 22.8 X10'3 (ref 1.8–7.7)
NEUTROPHILS # BLD AUTO: 24.5 X10'3 (ref 1.8–7.7)
NEUTROPHILS NFR BLD AUTO: 84.4 % (ref 42–75)
NEUTROPHILS NFR BLD AUTO: 84.6 % (ref 42–75)
NEUTROPHILS NFR BLD AUTO: 84.7 % (ref 42–75)
NEUTROPHILS NFR BLD AUTO: 84.8 % (ref 42–75)
NEUTROPHILS NFR BLD AUTO: 85.8 % (ref 42–75)
NEUTROPHILS NFR BLD AUTO: 87.3 % (ref 42–75)
NEUTS BAND # BLD MANUAL: 1 % (ref 0–10)
NEUTS SEG NFR BLD MANUAL: 81 % (ref 42–75)
NEUTS SEG NFR BLD MANUAL: 89 % (ref 42–75)
NRBC BLD MANUAL-RTO: 17 /100WBC (ref 0–0)
NRBC BLD MANUAL-RTO: 18 /100WBC (ref 0–0)
O2/TOTAL GAS SETTING VFR VENT: 30 MMHG/%
OVALOCYTES BLD QL SMEAR: (no result)
OXYHGB MFR BLDA: 97.1 % (ref 94–100)
PCO2 TEMP ADJ BLDA: 32 MMHG (ref 35–45)
PEEP RESPIRATORY: 5 CM H2O
PH TEMP ADJ BLDA: 7.52 [PH] (ref 7.35–7.45)
PHOSPHATE SERPL-MCNC: 3.2 MG/DL (ref 2.3–4.5)
PHOSPHATE SERPL-MCNC: 3.3 MG/DL (ref 2.3–4.5)
PHOSPHATE SERPL-MCNC: 3.5 MG/DL (ref 2.3–4.5)
PHOSPHATE SERPL-MCNC: 3.7 MG/DL (ref 2.3–4.5)
PLATELET # BLD AUTO: 143 X10'3 (ref 140–440)
PLATELET # BLD AUTO: 149 X10'3 (ref 140–440)
PLATELET # BLD AUTO: 150 X10'3 (ref 140–440)
PLATELET # BLD AUTO: 153 X10'3 (ref 140–440)
PLATELET # BLD AUTO: 155 X10'3 (ref 140–440)
PLATELET # BLD AUTO: 167 X10'3 (ref 140–440)
PLATELET BLD QL SMEAR: NORMAL
PLATELET BLD QL SMEAR: NORMAL
PMV BLD AUTO: 8.2 FL (ref 7.4–10.4)
PMV BLD AUTO: 8.3 FL (ref 7.4–10.4)
PMV BLD AUTO: 8.5 FL (ref 7.4–10.4)
PMV BLD AUTO: 8.6 FL (ref 7.4–10.4)
PMV BLD AUTO: 8.7 FL (ref 7.4–10.4)
PMV BLD AUTO: 8.7 FL (ref 7.4–10.4)
PO2 TEMP ADJ BLD: 98.3 MMHG (ref 83–108)
POLYCHROMASIA BLD QL SMEAR: (no result)
POLYCHROMASIA BLD QL SMEAR: (no result)
POTASSIUM SERPL-SCNC: 4.4 MMOL/L (ref 3.5–5.1)
POTASSIUM SERPL-SCNC: 4.5 MMOL/L (ref 3.5–5.1)
POTASSIUM SERPL-SCNC: 4.8 MMOL/L (ref 3.5–5.1)
POTASSIUM SERPL-SCNC: 5 MMOL/L (ref 3.5–5.1)
PREALB SERPL-MCNC: 17 MG/DL (ref 19–36)
PROT SERPL-MCNC: 6.6 G/DL (ref 6.4–8.2)
RBC # BLD AUTO: 2.02 X10'6 (ref 4.7–6.1)
RBC # BLD AUTO: 2.28 X10'6 (ref 4.7–6.1)
RBC # BLD AUTO: 2.44 X10'6 (ref 4.7–6.1)
RBC # BLD AUTO: 2.55 X10'6 (ref 4.7–6.1)
RBC # BLD AUTO: 2.87 X10'6 (ref 4.7–6.1)
RBC # BLD AUTO: 2.87 X10'6 (ref 4.7–6.1)
RBC MORPH BLD: (no result)
RBC MORPH BLD: (no result)
RESP RATE 1H: 16 B/MIN
RESP RATE RESTING: 18 B/MIN
SAO2 % BLDA: 97.5 % (ref 95–98)
SCHISTOCYTES BLD QL SMEAR: (no result)
SODIUM SERPL-SCNC: 136 MMOL/L (ref 135–145)
SODIUM SERPL-SCNC: 137 MMOL/L (ref 135–145)
SODIUM SERPL-SCNC: 138 MMOL/L (ref 135–145)
SODIUM SERPL-SCNC: 138 MMOL/L (ref 135–145)
SPONT VT COMPRES GAS VOL SET UNCOR VENT: 500 ML
TOTAL CELLS COUNTED FLD: 100
TOTAL CELLS COUNTED FLD: 100
TRIGL SERPL-MCNC: 49 MG/DL (ref 20–135)
VOL.EXP/M/BW ON VENT: 10 L/MIN
WBC # BLD AUTO: 19 X10'3 (ref 4.5–11)
WBC # BLD AUTO: 23 X10'3 (ref 4.5–11)
WBC # BLD AUTO: 25.3 X10'3 (ref 4.5–11)
WBC # BLD AUTO: 25.5 X10'3 (ref 4.5–11)
WBC # BLD AUTO: 27 X10'3 (ref 4.5–11)
WBC # BLD AUTO: 28.9 X10'3 (ref 4.5–11)

## 2019-12-12 PROCEDURE — B548ZZA ULTRASONOGRAPHY OF SUPERIOR VENA CAVA, GUIDANCE: ICD-10-PCS | Performed by: RADIOLOGY

## 2019-12-12 PROCEDURE — 0JH63XZ INSERTION OF TUNNELED VASCULAR ACCESS DEVICE INTO CHEST SUBCUTANEOUS TISSUE AND FASCIA, PERCUTANEOUS APPROACH: ICD-10-PCS | Performed by: RADIOLOGY

## 2019-12-12 PROCEDURE — 5A1D90Z PERFORMANCE OF URINARY FILTRATION, CONTINUOUS, GREATER THAN 18 HOURS PER DAY: ICD-10-PCS | Performed by: INTERNAL MEDICINE

## 2019-12-12 PROCEDURE — 02H633Z INSERTION OF INFUSION DEVICE INTO RIGHT ATRIUM, PERCUTANEOUS APPROACH: ICD-10-PCS | Performed by: RADIOLOGY

## 2019-12-12 RX ADMIN — CALCIUM CHLORIDE, MAGNESIUM CHLORIDE, DEXTROSE MONOHYDRATE, LACTIC ACID, SODIUM CHLORIDE, SODIUM BICARBONATE AND POTASSIUM CHLORIDE SCH MLS/HR: 5.15; 2.03; 22; 5.4; 6.46; 3.09; .157 INJECTION INTRAVENOUS at 18:30

## 2019-12-12 RX ADMIN — METHYLPREDNISOLONE SODIUM SUCCINATE SCH MG: 40 INJECTION, POWDER, FOR SOLUTION INTRAMUSCULAR; INTRAVENOUS at 07:23

## 2019-12-12 RX ADMIN — IPRATROPIUM BROMIDE AND ALBUTEROL SULFATE SCH ML: .5; 3 SOLUTION RESPIRATORY (INHALATION) at 15:20

## 2019-12-12 RX ADMIN — CALCIUM CHLORIDE, MAGNESIUM CHLORIDE, DEXTROSE MONOHYDRATE, LACTIC ACID, SODIUM CHLORIDE, SODIUM BICARBONATE AND POTASSIUM CHLORIDE SCH MLS/HR: 5.15; 2.03; 22; 5.4; 6.46; 3.09; .157 INJECTION INTRAVENOUS at 11:00

## 2019-12-12 RX ADMIN — HEPARIN SODIUM SCH UNIT: 5000 INJECTION, SOLUTION INTRAVENOUS; SUBCUTANEOUS at 20:08

## 2019-12-12 RX ADMIN — IPRATROPIUM BROMIDE AND ALBUTEROL SULFATE SCH ML: .5; 3 SOLUTION RESPIRATORY (INHALATION) at 02:33

## 2019-12-12 RX ADMIN — METHYLPREDNISOLONE SODIUM SUCCINATE SCH MG: 40 INJECTION, POWDER, FOR SOLUTION INTRAMUSCULAR; INTRAVENOUS at 20:09

## 2019-12-12 RX ADMIN — CALCIUM CHLORIDE, MAGNESIUM CHLORIDE, DEXTROSE MONOHYDRATE, LACTIC ACID, SODIUM CHLORIDE, SODIUM BICARBONATE AND POTASSIUM CHLORIDE SCH MLS/HR: 5.15; 2.03; 22; 5.4; 6.46; 3.09; .157 INJECTION INTRAVENOUS at 08:52

## 2019-12-12 RX ADMIN — Medication SCH MMU: at 20:00

## 2019-12-12 RX ADMIN — MIDODRINE HYDROCHLORIDE SCH MG: 5 TABLET ORAL at 07:19

## 2019-12-12 RX ADMIN — CALCIUM CHLORIDE, MAGNESIUM CHLORIDE, DEXTROSE MONOHYDRATE, LACTIC ACID, SODIUM CHLORIDE, SODIUM BICARBONATE AND POTASSIUM CHLORIDE SCH MLS/HR: 5.15; 2.03; 22; 5.4; 6.46; 3.09; .157 INJECTION INTRAVENOUS at 14:09

## 2019-12-12 RX ADMIN — DOCUSATE SODIUM SCH MG: 50 LIQUID ORAL at 20:00

## 2019-12-12 RX ADMIN — METOCLOPRAMIDE SCH MG: 5 INJECTION, SOLUTION INTRAMUSCULAR; INTRAVENOUS at 20:08

## 2019-12-12 RX ADMIN — METHYLPREDNISOLONE SODIUM SUCCINATE SCH MG: 40 INJECTION, POWDER, FOR SOLUTION INTRAMUSCULAR; INTRAVENOUS at 02:15

## 2019-12-12 RX ADMIN — IPRATROPIUM BROMIDE AND ALBUTEROL SULFATE SCH ML: .5; 3 SOLUTION RESPIRATORY (INHALATION) at 19:41

## 2019-12-12 RX ADMIN — DEXTROSE SCH MLS/HR: 5 SOLUTION INTRAVENOUS at 02:15

## 2019-12-12 RX ADMIN — CALCIUM CHLORIDE, MAGNESIUM CHLORIDE, DEXTROSE MONOHYDRATE, LACTIC ACID, SODIUM CHLORIDE, SODIUM BICARBONATE AND POTASSIUM CHLORIDE SCH MLS/HR: 5.15; 2.03; 22; 5.4; 6.46; 3.09; .157 INJECTION INTRAVENOUS at 22:00

## 2019-12-12 RX ADMIN — METOCLOPRAMIDE SCH MG: 5 INJECTION, SOLUTION INTRAMUSCULAR; INTRAVENOUS at 02:15

## 2019-12-12 RX ADMIN — VANCOMYCIN HYDROCHLORIDE SCH MLS/HR: 1.25 INJECTION, POWDER, LYOPHILIZED, FOR SOLUTION INTRAVENOUS at 11:51

## 2019-12-12 RX ADMIN — CALCIUM CHLORIDE, MAGNESIUM CHLORIDE, DEXTROSE MONOHYDRATE, LACTIC ACID, SODIUM CHLORIDE, SODIUM BICARBONATE AND POTASSIUM CHLORIDE SCH MLS/HR: 5.15; 2.03; 22; 5.4; 6.46; 3.09; .157 INJECTION INTRAVENOUS at 04:45

## 2019-12-12 RX ADMIN — CALCIUM CHLORIDE, MAGNESIUM CHLORIDE, DEXTROSE MONOHYDRATE, LACTIC ACID, SODIUM CHLORIDE, SODIUM BICARBONATE AND POTASSIUM CHLORIDE SCH MLS/HR: 5.15; 2.03; 22; 5.4; 6.46; 3.09; .157 INJECTION INTRAVENOUS at 03:28

## 2019-12-12 RX ADMIN — METOCLOPRAMIDE SCH MG: 5 INJECTION, SOLUTION INTRAMUSCULAR; INTRAVENOUS at 07:23

## 2019-12-12 RX ADMIN — CEFEPIME HYDROCHLORIDE SCH MLS/HR: 1 INJECTION, SOLUTION INTRAVENOUS at 07:24

## 2019-12-12 RX ADMIN — MINERAL OIL, PETROLATUM SCH INCH: 425; 568 OINTMENT OPHTHALMIC at 14:09

## 2019-12-12 RX ADMIN — CALCIUM CHLORIDE, MAGNESIUM CHLORIDE, DEXTROSE MONOHYDRATE, LACTIC ACID, SODIUM CHLORIDE, SODIUM BICARBONATE AND POTASSIUM CHLORIDE SCH MLS/HR: 5.15; 2.03; 22; 5.4; 6.46; 3.09; .157 INJECTION INTRAVENOUS at 09:45

## 2019-12-12 RX ADMIN — METOCLOPRAMIDE SCH MG: 5 INJECTION, SOLUTION INTRAMUSCULAR; INTRAVENOUS at 14:08

## 2019-12-12 RX ADMIN — METHYLNALTREXONE BROMIDE SCH MG: 12 INJECTION, SOLUTION SUBCUTANEOUS at 07:22

## 2019-12-12 RX ADMIN — CALCIUM CHLORIDE, MAGNESIUM CHLORIDE, DEXTROSE MONOHYDRATE, LACTIC ACID, SODIUM CHLORIDE, SODIUM BICARBONATE AND POTASSIUM CHLORIDE SCH MLS/HR: 5.15; 2.03; 22; 5.4; 6.46; 3.09; .157 INJECTION INTRAVENOUS at 12:15

## 2019-12-12 RX ADMIN — Medication SCH MMU: at 07:20

## 2019-12-12 RX ADMIN — IPRATROPIUM BROMIDE AND ALBUTEROL SULFATE SCH ML: .5; 3 SOLUTION RESPIRATORY (INHALATION) at 07:23

## 2019-12-12 RX ADMIN — SODIUM CHLORIDE SCH MLS/HR: 9 INJECTION INTRAMUSCULAR; INTRAVENOUS; SUBCUTANEOUS at 16:15

## 2019-12-12 RX ADMIN — DEXMEDETOMIDINE HYDROCHLORIDE SCH MLS/HR: 4 INJECTION, SOLUTION INTRAVENOUS at 03:36

## 2019-12-12 RX ADMIN — Medication SCH MLS/HR: at 13:31

## 2019-12-12 RX ADMIN — DOCUSATE SODIUM SCH MG: 50 LIQUID ORAL at 08:22

## 2019-12-12 RX ADMIN — IPRATROPIUM BROMIDE AND ALBUTEROL SULFATE SCH ML: .5; 3 SOLUTION RESPIRATORY (INHALATION) at 11:15

## 2019-12-12 RX ADMIN — CALCIUM CHLORIDE, MAGNESIUM CHLORIDE, DEXTROSE MONOHYDRATE, LACTIC ACID, SODIUM CHLORIDE, SODIUM BICARBONATE AND POTASSIUM CHLORIDE SCH MLS/HR: 5.15; 2.03; 22; 5.4; 6.46; 3.09; .157 INJECTION INTRAVENOUS at 07:15

## 2019-12-12 RX ADMIN — DEXTROSE SCH MLS/HR: 5 SOLUTION INTRAVENOUS at 07:23

## 2019-12-12 RX ADMIN — MINERAL OIL, PETROLATUM SCH INCH: 425; 568 OINTMENT OPHTHALMIC at 20:00

## 2019-12-12 RX ADMIN — DEXMEDETOMIDINE HYDROCHLORIDE SCH MLS/HR: 4 INJECTION, SOLUTION INTRAVENOUS at 07:26

## 2019-12-12 RX ADMIN — CALCIUM CHLORIDE, MAGNESIUM CHLORIDE, DEXTROSE MONOHYDRATE, LACTIC ACID, SODIUM CHLORIDE, SODIUM BICARBONATE AND POTASSIUM CHLORIDE SCH MLS/HR: 5.15; 2.03; 22; 5.4; 6.46; 3.09; .157 INJECTION INTRAVENOUS at 16:06

## 2019-12-12 RX ADMIN — HEPARIN SODIUM SCH UNIT: 5000 INJECTION, SOLUTION INTRAVENOUS; SUBCUTANEOUS at 07:22

## 2019-12-12 RX ADMIN — CALCIUM CHLORIDE, MAGNESIUM CHLORIDE, DEXTROSE MONOHYDRATE, LACTIC ACID, SODIUM CHLORIDE, SODIUM BICARBONATE AND POTASSIUM CHLORIDE SCH MLS/HR: 5.15; 2.03; 22; 5.4; 6.46; 3.09; .157 INJECTION INTRAVENOUS at 17:22

## 2019-12-12 RX ADMIN — CALCIUM CHLORIDE, MAGNESIUM CHLORIDE, DEXTROSE MONOHYDRATE, LACTIC ACID, SODIUM CHLORIDE, SODIUM BICARBONATE AND POTASSIUM CHLORIDE SCH MLS/HR: 5.15; 2.03; 22; 5.4; 6.46; 3.09; .157 INJECTION INTRAVENOUS at 03:27

## 2019-12-12 RX ADMIN — METHYLPREDNISOLONE SODIUM SUCCINATE SCH MG: 40 INJECTION, POWDER, FOR SOLUTION INTRAMUSCULAR; INTRAVENOUS at 14:08

## 2019-12-12 RX ADMIN — DEXMEDETOMIDINE HYDROCHLORIDE SCH MLS/HR: 4 INJECTION, SOLUTION INTRAVENOUS at 13:31

## 2019-12-12 RX ADMIN — IPRATROPIUM BROMIDE AND ALBUTEROL SULFATE SCH ML: .5; 3 SOLUTION RESPIRATORY (INHALATION) at 22:54

## 2019-12-12 RX ADMIN — MINERAL OIL, PETROLATUM SCH INCH: 425; 568 OINTMENT OPHTHALMIC at 08:22

## 2019-12-12 RX ADMIN — CALCIUM CHLORIDE, MAGNESIUM CHLORIDE, DEXTROSE MONOHYDRATE, LACTIC ACID, SODIUM CHLORIDE, SODIUM BICARBONATE AND POTASSIUM CHLORIDE SCH MLS/HR: 5.15; 2.03; 22; 5.4; 6.46; 3.09; .157 INJECTION INTRAVENOUS at 14:54

## 2019-12-12 RX ADMIN — MINERAL OIL, PETROLATUM SCH INCH: 425; 568 OINTMENT OPHTHALMIC at 02:15

## 2019-12-12 RX ADMIN — CALCIUM CHLORIDE, MAGNESIUM CHLORIDE, DEXTROSE MONOHYDRATE, LACTIC ACID, SODIUM CHLORIDE, SODIUM BICARBONATE AND POTASSIUM CHLORIDE SCH MLS/HR: 5.15; 2.03; 22; 5.4; 6.46; 3.09; .157 INJECTION INTRAVENOUS at 19:45

## 2019-12-12 RX ADMIN — MIDODRINE HYDROCHLORIDE SCH MG: 5 TABLET ORAL at 16:06

## 2019-12-12 RX ADMIN — CALCIUM CHLORIDE, MAGNESIUM CHLORIDE, DEXTROSE MONOHYDRATE, LACTIC ACID, SODIUM CHLORIDE, SODIUM BICARBONATE AND POTASSIUM CHLORIDE SCH MLS/HR: 5.15; 2.03; 22; 5.4; 6.46; 3.09; .157 INJECTION INTRAVENOUS at 06:00

## 2019-12-12 NOTE — NUR
pt is awake alert and confused. pt voice is hoarse. pt will ask questions but will not 
answer when asked questions. when ask if patient if squeeze my hands or move his feet pt 
turned his head away from me. unable to assess true mentation due to being unsure if he 
doesn't understand or is refusing to comply. pt followed commands this morning so unable to 
determine if there is in fact a mentation change. pt able to move all 4 extremities. cvvh is 
running, continuing to removes 50 cc per hour. insulin and levophed drips still running per 
protocol. lung sounds are coarse and diminished throughout. bowel sounds present in all 4 
quadrants. no bm present.  pt is npo following swallow study and procedure tomorrow. heels, 
elbows, coccyx intact blanchable, free from breakdown. no bowel movement as of yet. vital 
signs are stable. no s/s of distress noted. will continue to monitor.

## 2019-12-12 NOTE — NUR
Tenzin catheter removed from R groin around 1200 while dialysis RNs were setting up CVVH 
machine. Pressure held for approx 10 min until no bleeding present. Induced moderate 
pressure around puncture site before dressing to ensure bleeding had stopped. No bleeding at 
this time. Site rechecked 10 min later and was CDI. Within next 10 min, was notified by 
dialysis RN that pt's entire bed was saturated in blood. LISA Kimbrough immediately held 
pressure on groin site with gauze. BP low 60s/30s. All other vitals stable. Levophed 
temporarily increased to max dose, BP slowly coming back up. H/H drawn with critical levels 
reported. Dr. Mccall notified and ordered 2 units PRBCs to be transfused immediately. Pt coughs 
aggressively often, possibly dislodged clot that had formed, ETT also out 6 cm. RT notified 
and tube was advanced back to 24cm. Pressure held on groin site for 20-30 min. Site assessed 
frequently. No further issues.

## 2019-12-12 NOTE — NUR
Reassessment: Pt on CVVH currently NPO for TDC today. Previously tolerating TF at goal. No 
BM yet this admit 8 days receiving colace, relistor, and reglan; OK to start mineral oil 
enemas today per MD. Elevated liver enzymes pending ammonia and gallbladder ultrasound per 
MD. Will continue to monitor.

Recommend:

1. OGTF using Vital High Protein at 75 ml/hr to provide total volume of

1800 ml, 1800 cals, 158g protein and 1512ml water. Initiate at 20ml/hr and

advance 20ml Q8 to goal as tolerated.

2. prealbumin q Monday and Thursday, daily weights

3. additional water flush per intensivist; Na 135

4. multiple routine bowel care; no BM 8 days; enemas per MD

5. When extubated and alert needs written and verbal DM education, A1c is

11.6

-------------------------------------------------------------------------------

Addendum: 12/12/19 at 1052 by Mal Greene RD

-------------------------------------------------------------------------------

Amended: Links added.

## 2019-12-12 NOTE — NUR
Patient in room ICU 2039. I have received report from LISA Rodriguez and had the opportunity to 
ask questions and assume patient care.

## 2019-12-12 NOTE — NUR
pt is talking more. pt says "he wants to talk to a  because we've kidnapped him" pt is 
A&O times 1 and is oriented to person. pt reoriented. will continue to monitor.

## 2019-12-12 NOTE — NUR
pt woke up and was attempting to sit up. pt is alert and following commands. pt nods 
appropriately to yes no questions. pt able to squeeze my hand and move his feet on command. 
pt is still resistive to care and is now throwing his head from side to side pulling on the 
et tube. educated pt on the purpose of et tube but patient continues to thrash in the bed. 
precedex increased.  will continue to monitor

## 2019-12-13 VITALS — DIASTOLIC BLOOD PRESSURE: 57 MMHG | SYSTOLIC BLOOD PRESSURE: 120 MMHG

## 2019-12-13 VITALS — SYSTOLIC BLOOD PRESSURE: 87 MMHG | DIASTOLIC BLOOD PRESSURE: 55 MMHG

## 2019-12-13 VITALS — SYSTOLIC BLOOD PRESSURE: 98 MMHG | DIASTOLIC BLOOD PRESSURE: 54 MMHG

## 2019-12-13 VITALS — SYSTOLIC BLOOD PRESSURE: 93 MMHG | DIASTOLIC BLOOD PRESSURE: 49 MMHG

## 2019-12-13 VITALS — DIASTOLIC BLOOD PRESSURE: 45 MMHG | SYSTOLIC BLOOD PRESSURE: 88 MMHG

## 2019-12-13 VITALS — DIASTOLIC BLOOD PRESSURE: 49 MMHG | SYSTOLIC BLOOD PRESSURE: 93 MMHG

## 2019-12-13 VITALS — DIASTOLIC BLOOD PRESSURE: 44 MMHG | SYSTOLIC BLOOD PRESSURE: 81 MMHG

## 2019-12-13 VITALS — DIASTOLIC BLOOD PRESSURE: 57 MMHG | SYSTOLIC BLOOD PRESSURE: 109 MMHG

## 2019-12-13 VITALS — DIASTOLIC BLOOD PRESSURE: 48 MMHG | SYSTOLIC BLOOD PRESSURE: 85 MMHG

## 2019-12-13 VITALS — DIASTOLIC BLOOD PRESSURE: 56 MMHG | SYSTOLIC BLOOD PRESSURE: 101 MMHG

## 2019-12-13 VITALS — DIASTOLIC BLOOD PRESSURE: 66 MMHG | SYSTOLIC BLOOD PRESSURE: 124 MMHG

## 2019-12-13 VITALS — SYSTOLIC BLOOD PRESSURE: 101 MMHG | DIASTOLIC BLOOD PRESSURE: 57 MMHG

## 2019-12-13 VITALS — DIASTOLIC BLOOD PRESSURE: 53 MMHG | SYSTOLIC BLOOD PRESSURE: 92 MMHG

## 2019-12-13 VITALS — SYSTOLIC BLOOD PRESSURE: 96 MMHG | DIASTOLIC BLOOD PRESSURE: 51 MMHG

## 2019-12-13 VITALS — SYSTOLIC BLOOD PRESSURE: 90 MMHG | DIASTOLIC BLOOD PRESSURE: 42 MMHG

## 2019-12-13 VITALS — SYSTOLIC BLOOD PRESSURE: 97 MMHG | DIASTOLIC BLOOD PRESSURE: 48 MMHG

## 2019-12-13 VITALS — SYSTOLIC BLOOD PRESSURE: 93 MMHG | DIASTOLIC BLOOD PRESSURE: 52 MMHG

## 2019-12-13 VITALS — SYSTOLIC BLOOD PRESSURE: 95 MMHG | DIASTOLIC BLOOD PRESSURE: 46 MMHG

## 2019-12-13 VITALS — DIASTOLIC BLOOD PRESSURE: 70 MMHG | SYSTOLIC BLOOD PRESSURE: 93 MMHG

## 2019-12-13 VITALS — SYSTOLIC BLOOD PRESSURE: 85 MMHG | DIASTOLIC BLOOD PRESSURE: 49 MMHG

## 2019-12-13 VITALS — DIASTOLIC BLOOD PRESSURE: 54 MMHG | SYSTOLIC BLOOD PRESSURE: 92 MMHG

## 2019-12-13 VITALS — SYSTOLIC BLOOD PRESSURE: 97 MMHG | DIASTOLIC BLOOD PRESSURE: 55 MMHG

## 2019-12-13 VITALS — SYSTOLIC BLOOD PRESSURE: 87 MMHG | DIASTOLIC BLOOD PRESSURE: 49 MMHG

## 2019-12-13 LAB
ALBUMIN SERPL BCP-MCNC: 3.7 G/DL (ref 3.4–5)
ALBUMIN SERPL BCP-MCNC: 3.9 G/DL (ref 3.4–5)
ALBUMIN/GLOB SERPL: 1.9 {RATIO} (ref 1.1–1.5)
ALP SERPL-CCNC: 174 IU/L (ref 46–116)
ALT SERPL W P-5'-P-CCNC: 247 U/L (ref 12–78)
ANION GAP SERPL CALCULATED.3IONS-SCNC: 10 MMOL/L (ref 8–16)
ANION GAP SERPL CALCULATED.3IONS-SCNC: 13 MMOL/L (ref 8–16)
ANION GAP SERPL CALCULATED.3IONS-SCNC: 7 MMOL/L (ref 8–16)
ANION GAP SERPL CALCULATED.3IONS-SCNC: 9 MMOL/L (ref 8–16)
ANISOCYTOSIS BLD QL SMEAR: (no result)
ANISOCYTOSIS BLD QL SMEAR: (no result)
APTT PPP: 31 SECONDS (ref 22–32)
AST SERPL W P-5'-P-CCNC: 208 U/L (ref 10–37)
BACTERIA URNS QL MICRO: (no result) /HPF
BASOPHILS # BLD AUTO: 0 X10'3 (ref 0–0.2)
BASOPHILS # BLD AUTO: 0 X10'3 (ref 0–0.2)
BASOPHILS # BLD AUTO: 0.1 X10'3 (ref 0–0.2)
BASOPHILS # BLD AUTO: 0.1 X10'3 (ref 0–0.2)
BASOPHILS NFR BLD AUTO: 0.1 % (ref 0–1)
BASOPHILS NFR BLD AUTO: 0.1 % (ref 0–1)
BASOPHILS NFR BLD AUTO: 0.3 % (ref 0–1)
BASOPHILS NFR BLD AUTO: 0.3 % (ref 0–1)
BILIRUB SERPL-MCNC: 4.1 MG/DL (ref 0.1–1)
BUN SERPL-MCNC: 32 MG/DL (ref 7–18)
BUN SERPL-MCNC: 34 MG/DL (ref 7–18)
BUN SERPL-MCNC: 37 MG/DL (ref 7–18)
BUN SERPL-MCNC: 38 MG/DL (ref 7–18)
BUN/CREAT SERPL: 21.8 (ref 5.4–32)
BUN/CREAT SERPL: 21.9 (ref 5.4–32)
BUN/CREAT SERPL: 22.8 (ref 5.4–32)
BUN/CREAT SERPL: 24.1 (ref 5.4–32)
CALCIUM SERPL-MCNC: 8.5 MG/DL (ref 8.5–10.1)
CALCIUM SERPL-MCNC: 8.7 MG/DL (ref 8.5–10.1)
CALCIUM SERPL-MCNC: 8.8 MG/DL (ref 8.5–10.1)
CALCIUM SERPL-MCNC: 8.9 MG/DL (ref 8.5–10.1)
CHLORIDE SERPL-SCNC: 102 MMOL/L (ref 99–107)
CHLORIDE SERPL-SCNC: 102 MMOL/L (ref 99–107)
CHLORIDE SERPL-SCNC: 103 MMOL/L (ref 99–107)
CHLORIDE SERPL-SCNC: 104 MMOL/L (ref 99–107)
CLARITY UR: (no result)
CO2 SERPL-SCNC: 24.7 MMOL/L (ref 24–32)
CO2 SERPL-SCNC: 26.6 MMOL/L (ref 24–32)
CO2 SERPL-SCNC: 26.6 MMOL/L (ref 24–32)
CO2 SERPL-SCNC: 28.4 MMOL/L (ref 24–32)
COLOR UR: (no result)
CREAT SERPL-MCNC: 1.47 MG/DL (ref 0.6–1.1)
CREAT SERPL-MCNC: 1.55 MG/DL (ref 0.6–1.1)
CREAT SERPL-MCNC: 1.58 MG/DL (ref 0.6–1.1)
CREAT SERPL-MCNC: 1.62 MG/DL (ref 0.6–1.1)
DEPRECATED SQUAMOUS URNS QL MICRO: (no result) /LPF
EOSINOPHIL # BLD AUTO: 0 X10'3 (ref 0–0.9)
EOSINOPHIL NFR BLD AUTO: 0 % (ref 0–6)
ERYTHROCYTE [DISTWIDTH] IN BLOOD BY AUTOMATED COUNT: 16.6 % (ref 11.5–14.5)
ERYTHROCYTE [DISTWIDTH] IN BLOOD BY AUTOMATED COUNT: 16.6 % (ref 11.5–14.5)
ERYTHROCYTE [DISTWIDTH] IN BLOOD BY AUTOMATED COUNT: 16.7 % (ref 11.5–14.5)
ERYTHROCYTE [DISTWIDTH] IN BLOOD BY AUTOMATED COUNT: 16.9 % (ref 11.5–14.5)
GFR SERPL CREATININE-BSD FRML MDRD: 42 ML/MIN
GFR SERPL CREATININE-BSD FRML MDRD: 43 ML/MIN
GFR SERPL CREATININE-BSD FRML MDRD: 44 ML/MIN
GFR SERPL CREATININE-BSD FRML MDRD: 47 ML/MIN
GLUCOSE SERPL-MCNC: 117 MG/DL (ref 70–104)
GLUCOSE SERPL-MCNC: 121 MG/DL (ref 70–104)
GLUCOSE SERPL-MCNC: 129 MG/DL (ref 70–104)
GLUCOSE SERPL-MCNC: 136 MG/DL (ref 70–104)
GLUCOSE UR STRIP-MCNC: (no result) MG/DL
HCT VFR BLD AUTO: 24.6 % (ref 42–52)
HCT VFR BLD AUTO: 24.9 % (ref 42–52)
HCT VFR BLD AUTO: 24.9 % (ref 42–52)
HCT VFR BLD AUTO: 25.8 % (ref 42–52)
HGB BLD-MCNC: 8.3 G/DL (ref 14–17.9)
HGB BLD-MCNC: 8.4 G/DL (ref 14–17.9)
HGB BLD-MCNC: 8.4 G/DL (ref 14–17.9)
HGB BLD-MCNC: 8.7 G/DL (ref 14–17.9)
HGB UR QL STRIP: (no result)
KETONES UR STRIP-MCNC: (no result) MG/DL
LEUKOCYTE ESTERASE UR QL STRIP: (no result)
LYMPHOCYTES # BLD AUTO: 1.4 X10'3 (ref 1.1–4.8)
LYMPHOCYTES # BLD AUTO: 1.6 X10'3 (ref 1.1–4.8)
LYMPHOCYTES # BLD AUTO: 1.8 X10'3 (ref 1.1–4.8)
LYMPHOCYTES # BLD AUTO: 1.9 X10'3 (ref 1.1–4.8)
LYMPHOCYTES NFR BLD AUTO: 4 % (ref 21–51)
LYMPHOCYTES NFR BLD AUTO: 5.4 % (ref 21–51)
LYMPHOCYTES NFR BLD AUTO: 5.6 % (ref 21–51)
LYMPHOCYTES NFR BLD AUTO: 5.7 % (ref 21–51)
LYMPHOCYTES NFR BLD MANUAL: 2 % (ref 21–51)
LYMPHOCYTES NFR BLD MANUAL: 3 % (ref 21–51)
MAGNESIUM SERPL-MCNC: 2.2 MG/DL (ref 1.5–2.4)
MCH RBC QN AUTO: 30.3 PG (ref 27–31)
MCH RBC QN AUTO: 30.5 PG (ref 27–31)
MCH RBC QN AUTO: 30.6 PG (ref 27–31)
MCH RBC QN AUTO: 30.6 PG (ref 27–31)
MCHC RBC AUTO-ENTMCNC: 33.7 G/DL (ref 33–36.5)
MCHC RBC AUTO-ENTMCNC: 33.7 G/DL (ref 33–36.5)
MCHC RBC AUTO-ENTMCNC: 33.8 G/DL (ref 33–36.5)
MCHC RBC AUTO-ENTMCNC: 33.9 G/DL (ref 33–36.5)
MCV RBC AUTO: 89.8 FL (ref 78–98)
MCV RBC AUTO: 90 FL (ref 78–98)
MCV RBC AUTO: 90.7 FL (ref 78–98)
MCV RBC AUTO: 90.9 FL (ref 78–98)
METAMYELOCYTES NFR BLD MANUAL: 5 % (ref 0–0)
MONOCYTES # BLD AUTO: 2.1 X10'3 (ref 0–0.9)
MONOCYTES # BLD AUTO: 2.4 X10'3 (ref 0–0.9)
MONOCYTES # BLD AUTO: 2.9 X10'3 (ref 0–0.9)
MONOCYTES # BLD AUTO: 3.4 X10'3 (ref 0–0.9)
MONOCYTES NFR BLD AUTO: 6.9 % (ref 2–12)
MONOCYTES NFR BLD AUTO: 7 % (ref 2–12)
MONOCYTES NFR BLD AUTO: 9.1 % (ref 2–12)
MONOCYTES NFR BLD AUTO: 9.8 % (ref 2–12)
MONOCYTES NFR BLD MANUAL: 14 % (ref 2–12)
MONOCYTES NFR BLD MANUAL: 9 % (ref 2–12)
NEUTROPHILS # BLD AUTO: 26.4 X10'3 (ref 1.8–7.7)
NEUTROPHILS # BLD AUTO: 27.1 X10'3 (ref 1.8–7.7)
NEUTROPHILS # BLD AUTO: 29 X10'3 (ref 1.8–7.7)
NEUTROPHILS # BLD AUTO: 30.6 X10'3 (ref 1.8–7.7)
NEUTROPHILS NFR BLD AUTO: 84.3 % (ref 42–75)
NEUTROPHILS NFR BLD AUTO: 84.9 % (ref 42–75)
NEUTROPHILS NFR BLD AUTO: 87.6 % (ref 42–75)
NEUTROPHILS NFR BLD AUTO: 88.9 % (ref 42–75)
NEUTS BAND # BLD MANUAL: 5 % (ref 0–10)
NEUTS SEG NFR BLD MANUAL: 79 % (ref 42–75)
NEUTS SEG NFR BLD MANUAL: 83 % (ref 42–75)
NITRITE UR QL STRIP: (no result)
NRBC BLD MANUAL-RTO: 15 /100WBC (ref 0–0)
NRBC BLD MANUAL-RTO: 8 /100WBC (ref 0–0)
PH UR STRIP: (no result) [PH] (ref 4.8–8)
PHOSPHATE SERPL-MCNC: 2.7 MG/DL (ref 2.3–4.5)
PHOSPHATE SERPL-MCNC: 2.9 MG/DL (ref 2.3–4.5)
PHOSPHATE SERPL-MCNC: 3 MG/DL (ref 2.3–4.5)
PHOSPHATE SERPL-MCNC: 3.3 MG/DL (ref 2.3–4.5)
PLATELET # BLD AUTO: 146 X10'3 (ref 140–440)
PLATELET # BLD AUTO: 150 X10'3 (ref 140–440)
PLATELET # BLD AUTO: 152 X10'3 (ref 140–440)
PLATELET # BLD AUTO: 153 X10'3 (ref 140–440)
PLATELET BLD QL SMEAR: NORMAL
PLATELET BLD QL SMEAR: NORMAL
PMV BLD AUTO: 8.2 FL (ref 7.4–10.4)
PMV BLD AUTO: 8.3 FL (ref 7.4–10.4)
PMV BLD AUTO: 8.4 FL (ref 7.4–10.4)
PMV BLD AUTO: 8.4 FL (ref 7.4–10.4)
POLYCHROMASIA BLD QL SMEAR: (no result)
POLYCHROMASIA BLD QL SMEAR: (no result)
POTASSIUM SERPL-SCNC: 3.9 MMOL/L (ref 3.5–5.1)
POTASSIUM SERPL-SCNC: 4.1 MMOL/L (ref 3.5–5.1)
POTASSIUM SERPL-SCNC: 4.3 MMOL/L (ref 3.5–5.1)
POTASSIUM SERPL-SCNC: 4.4 MMOL/L (ref 3.5–5.1)
PROT SERPL-MCNC: 6 G/DL (ref 6.4–8.2)
PROT UR QL STRIP: (no result) MG/DL
RBC # BLD AUTO: 2.71 X10'6 (ref 4.7–6.1)
RBC # BLD AUTO: 2.74 X10'6 (ref 4.7–6.1)
RBC # BLD AUTO: 2.76 X10'6 (ref 4.7–6.1)
RBC # BLD AUTO: 2.87 X10'6 (ref 4.7–6.1)
RBC #/AREA URNS HPF: (no result) /HPF (ref 0–2)
RBC MORPH BLD: (no result)
RBC MORPH BLD: (no result)
SODIUM SERPL-SCNC: 139 MMOL/L (ref 135–145)
SODIUM SERPL-SCNC: 140 MMOL/L (ref 135–145)
SP GR UR STRIP: (no result) (ref 1–1.03)
SPERM URNS QL MICRO: (no result) /HPF
TOTAL CELLS COUNTED FLD: 100
TOTAL CELLS COUNTED FLD: 100
TRANS CELLS URNS QL MICRO: (no result) /HPF
URN COLLECT METHOD CLASS: (no result)
UROBILINOGEN UR STRIP-MCNC: (no result) E.U/DL (ref 0.2–1)
WBC # BLD AUTO: 30.1 X10'3 (ref 4.5–11)
WBC # BLD AUTO: 31.8 X10'3 (ref 4.5–11)
WBC # BLD AUTO: 34.4 X10'3 (ref 4.5–11)
WBC # BLD AUTO: 34.5 X10'3 (ref 4.5–11)
WBC #/AREA URNS HPF: (no result) /HPF (ref 0–4)
YEAST URNS QL MICRO: (no result) /HPF

## 2019-12-13 PROCEDURE — 5A1D90Z PERFORMANCE OF URINARY FILTRATION, CONTINUOUS, GREATER THAN 18 HOURS PER DAY: ICD-10-PCS | Performed by: INTERNAL MEDICINE

## 2019-12-13 RX ADMIN — VANCOMYCIN HYDROCHLORIDE SCH MLS/HR: 1.25 INJECTION, POWDER, LYOPHILIZED, FOR SOLUTION INTRAVENOUS at 12:22

## 2019-12-13 RX ADMIN — Medication SCH MLS/HR: at 14:43

## 2019-12-13 RX ADMIN — Medication SCH MLS/HR: at 09:41

## 2019-12-13 RX ADMIN — IPRATROPIUM BROMIDE AND ALBUTEROL SULFATE SCH ML: .5; 3 SOLUTION RESPIRATORY (INHALATION) at 23:03

## 2019-12-13 RX ADMIN — METOCLOPRAMIDE SCH MG: 5 INJECTION, SOLUTION INTRAMUSCULAR; INTRAVENOUS at 07:28

## 2019-12-13 RX ADMIN — IPRATROPIUM BROMIDE AND ALBUTEROL SULFATE SCH ML: .5; 3 SOLUTION RESPIRATORY (INHALATION) at 11:37

## 2019-12-13 RX ADMIN — Medication SCH MMU: at 20:26

## 2019-12-13 RX ADMIN — MINERAL OIL, PETROLATUM SCH INCH: 425; 568 OINTMENT OPHTHALMIC at 07:33

## 2019-12-13 RX ADMIN — INSULIN HUMAN SCH MLS/HR: 100 INJECTION, SOLUTION PARENTERAL at 06:20

## 2019-12-13 RX ADMIN — CALCIUM CHLORIDE, MAGNESIUM CHLORIDE, DEXTROSE MONOHYDRATE, LACTIC ACID, SODIUM CHLORIDE, SODIUM BICARBONATE AND POTASSIUM CHLORIDE SCH MLS/HR: 5.15; 2.03; 22; 5.4; 6.46; 3.09; .157 INJECTION INTRAVENOUS at 02:59

## 2019-12-13 RX ADMIN — Medication SCH MLS/HR: at 10:24

## 2019-12-13 RX ADMIN — MIDODRINE HYDROCHLORIDE SCH MG: 5 TABLET ORAL at 15:15

## 2019-12-13 RX ADMIN — DOCUSATE SODIUM SCH MG: 50 LIQUID ORAL at 20:26

## 2019-12-13 RX ADMIN — METOCLOPRAMIDE SCH MG: 5 INJECTION, SOLUTION INTRAMUSCULAR; INTRAVENOUS at 02:58

## 2019-12-13 RX ADMIN — CALCIUM CHLORIDE, MAGNESIUM CHLORIDE, DEXTROSE MONOHYDRATE, LACTIC ACID, SODIUM CHLORIDE, SODIUM BICARBONATE AND POTASSIUM CHLORIDE SCH MLS/HR: 5.15; 2.03; 22; 5.4; 6.46; 3.09; .157 INJECTION INTRAVENOUS at 00:51

## 2019-12-13 RX ADMIN — MINERAL OIL, PETROLATUM SCH INCH: 425; 568 OINTMENT OPHTHALMIC at 02:00

## 2019-12-13 RX ADMIN — IPRATROPIUM BROMIDE AND ALBUTEROL SULFATE SCH ML: .5; 3 SOLUTION RESPIRATORY (INHALATION) at 19:19

## 2019-12-13 RX ADMIN — Medication SCH MLS/HR: at 06:32

## 2019-12-13 RX ADMIN — MIDODRINE HYDROCHLORIDE SCH MG: 5 TABLET ORAL at 07:34

## 2019-12-13 RX ADMIN — CALCIUM CHLORIDE, MAGNESIUM CHLORIDE, DEXTROSE MONOHYDRATE, LACTIC ACID, SODIUM CHLORIDE, SODIUM BICARBONATE AND POTASSIUM CHLORIDE SCH MLS/HR: 5.15; 2.03; 22; 5.4; 6.46; 3.09; .157 INJECTION INTRAVENOUS at 02:58

## 2019-12-13 RX ADMIN — CEFEPIME HYDROCHLORIDE SCH MLS/HR: 1 INJECTION, SOLUTION INTRAVENOUS at 07:28

## 2019-12-13 RX ADMIN — METHYLPREDNISOLONE SODIUM SUCCINATE SCH MG: 40 INJECTION, POWDER, FOR SOLUTION INTRAMUSCULAR; INTRAVENOUS at 07:29

## 2019-12-13 RX ADMIN — IPRATROPIUM BROMIDE AND ALBUTEROL SULFATE SCH ML: .5; 3 SOLUTION RESPIRATORY (INHALATION) at 07:05

## 2019-12-13 RX ADMIN — MIDODRINE HYDROCHLORIDE SCH MG: 5 TABLET ORAL at 00:00

## 2019-12-13 RX ADMIN — METHYLPREDNISOLONE SODIUM SUCCINATE SCH MG: 40 INJECTION, POWDER, FOR SOLUTION INTRAMUSCULAR; INTRAVENOUS at 02:57

## 2019-12-13 RX ADMIN — HEPARIN SODIUM SCH UNIT: 5000 INJECTION, SOLUTION INTRAVENOUS; SUBCUTANEOUS at 07:35

## 2019-12-13 RX ADMIN — POTASSIUM CHLORIDE PRN MLS/HR: 14.9 INJECTION, SOLUTION INTRAVENOUS at 16:32

## 2019-12-13 RX ADMIN — Medication SCH MLS/HR: at 22:44

## 2019-12-13 RX ADMIN — METOCLOPRAMIDE SCH MG: 5 INJECTION, SOLUTION INTRAMUSCULAR; INTRAVENOUS at 14:04

## 2019-12-13 RX ADMIN — Medication SCH MLS/HR: at 12:34

## 2019-12-13 RX ADMIN — DOCUSATE SODIUM SCH MG: 50 LIQUID ORAL at 07:33

## 2019-12-13 RX ADMIN — METHYLPREDNISOLONE SODIUM SUCCINATE SCH MG: 40 INJECTION, POWDER, FOR SOLUTION INTRAMUSCULAR; INTRAVENOUS at 20:25

## 2019-12-13 RX ADMIN — Medication SCH MLS/HR: at 14:04

## 2019-12-13 RX ADMIN — MINERAL OIL, PETROLATUM SCH INCH: 425; 568 OINTMENT OPHTHALMIC at 14:00

## 2019-12-13 RX ADMIN — METHYLPREDNISOLONE SODIUM SUCCINATE SCH MG: 40 INJECTION, POWDER, FOR SOLUTION INTRAMUSCULAR; INTRAVENOUS at 14:04

## 2019-12-13 RX ADMIN — DEXMEDETOMIDINE HYDROCHLORIDE SCH MLS/HR: 4 INJECTION, SOLUTION INTRAVENOUS at 08:25

## 2019-12-13 RX ADMIN — Medication SCH MLS/HR: at 17:42

## 2019-12-13 RX ADMIN — Medication SCH MLS/HR: at 16:33

## 2019-12-13 RX ADMIN — Medication SCH MLS/HR: at 11:18

## 2019-12-13 RX ADMIN — Medication SCH MLS/HR: at 22:45

## 2019-12-13 RX ADMIN — Medication SCH MMU: at 07:34

## 2019-12-13 RX ADMIN — IPRATROPIUM BROMIDE AND ALBUTEROL SULFATE SCH ML: .5; 3 SOLUTION RESPIRATORY (INHALATION) at 14:55

## 2019-12-13 RX ADMIN — IPRATROPIUM BROMIDE AND ALBUTEROL SULFATE SCH ML: .5; 3 SOLUTION RESPIRATORY (INHALATION) at 03:07

## 2019-12-13 RX ADMIN — METOCLOPRAMIDE SCH MG: 5 INJECTION, SOLUTION INTRAMUSCULAR; INTRAVENOUS at 20:25

## 2019-12-13 RX ADMIN — HEPARIN SODIUM SCH UNIT: 5000 INJECTION, SOLUTION INTRAVENOUS; SUBCUTANEOUS at 20:25

## 2019-12-13 RX ADMIN — POTASSIUM CHLORIDE PRN MLS/HR: 14.9 INJECTION, SOLUTION INTRAVENOUS at 15:16

## 2019-12-13 NOTE — NUR
Patient in room ICU 2039. I have received report from Angie GONZALEZ and had the opportunity to 
ask questions and assume patient care. Patient awake in bed, orientated to self and time, on 
room air with spo2 at 97%, norepinephrine drip to keep MAP greater than 60, will titrate per 
protocol, see IV spreadsheet and interventions for further information. All monitoring 
alarms audible. CVVH per provider orders, clot forming in filter, CVVH is running at this 
time. Will continue to monitor.

## 2019-12-13 NOTE — NUR
Problems reprioritized. Patient report given, questions answered & plan of care reviewed 
with LISA Nolan.

## 2019-12-13 NOTE — NUR
pt requested water. started pt with a mouth swab and ice to suck on. pt immediately started 
coughing. unsafe to give pt oral me at this time. reassessment needed pending swallow eval.

## 2019-12-13 NOTE — NUR
Dr. Mccall at bedside; notified of increasing WBC up to 34.4; orders for blood cultures and 
urine sample. MD also aware of AST/ALT and increasing bilirubin up to 4.1 from 3.1. Will 
continue CVVH for today.

## 2019-12-13 NOTE — NUR
pt is convinced he is at [facility], when reoriented he states " no i want to go to 
[place], why can't I leave?" pt informed he is in critical condition with multiple medical 
needs. pt is now insisting that he leaves. pt remains A&Ox1.

## 2019-12-14 VITALS — SYSTOLIC BLOOD PRESSURE: 102 MMHG | DIASTOLIC BLOOD PRESSURE: 57 MMHG

## 2019-12-14 VITALS — SYSTOLIC BLOOD PRESSURE: 86 MMHG | DIASTOLIC BLOOD PRESSURE: 47 MMHG

## 2019-12-14 VITALS — DIASTOLIC BLOOD PRESSURE: 52 MMHG | SYSTOLIC BLOOD PRESSURE: 101 MMHG

## 2019-12-14 VITALS — SYSTOLIC BLOOD PRESSURE: 110 MMHG | DIASTOLIC BLOOD PRESSURE: 64 MMHG

## 2019-12-14 VITALS — DIASTOLIC BLOOD PRESSURE: 56 MMHG | SYSTOLIC BLOOD PRESSURE: 113 MMHG

## 2019-12-14 VITALS — DIASTOLIC BLOOD PRESSURE: 52 MMHG | SYSTOLIC BLOOD PRESSURE: 90 MMHG

## 2019-12-14 VITALS — DIASTOLIC BLOOD PRESSURE: 56 MMHG | SYSTOLIC BLOOD PRESSURE: 96 MMHG

## 2019-12-14 VITALS — DIASTOLIC BLOOD PRESSURE: 57 MMHG | SYSTOLIC BLOOD PRESSURE: 102 MMHG

## 2019-12-14 VITALS — SYSTOLIC BLOOD PRESSURE: 105 MMHG | DIASTOLIC BLOOD PRESSURE: 56 MMHG

## 2019-12-14 VITALS — SYSTOLIC BLOOD PRESSURE: 104 MMHG | DIASTOLIC BLOOD PRESSURE: 63 MMHG

## 2019-12-14 VITALS — SYSTOLIC BLOOD PRESSURE: 103 MMHG | DIASTOLIC BLOOD PRESSURE: 53 MMHG

## 2019-12-14 VITALS — DIASTOLIC BLOOD PRESSURE: 51 MMHG | SYSTOLIC BLOOD PRESSURE: 97 MMHG

## 2019-12-14 VITALS — SYSTOLIC BLOOD PRESSURE: 91 MMHG | DIASTOLIC BLOOD PRESSURE: 46 MMHG

## 2019-12-14 VITALS — SYSTOLIC BLOOD PRESSURE: 113 MMHG | DIASTOLIC BLOOD PRESSURE: 65 MMHG

## 2019-12-14 VITALS — SYSTOLIC BLOOD PRESSURE: 107 MMHG | DIASTOLIC BLOOD PRESSURE: 58 MMHG

## 2019-12-14 VITALS — SYSTOLIC BLOOD PRESSURE: 103 MMHG | DIASTOLIC BLOOD PRESSURE: 57 MMHG

## 2019-12-14 VITALS — DIASTOLIC BLOOD PRESSURE: 52 MMHG | SYSTOLIC BLOOD PRESSURE: 107 MMHG

## 2019-12-14 VITALS — DIASTOLIC BLOOD PRESSURE: 58 MMHG | SYSTOLIC BLOOD PRESSURE: 109 MMHG

## 2019-12-14 VITALS — DIASTOLIC BLOOD PRESSURE: 48 MMHG | SYSTOLIC BLOOD PRESSURE: 101 MMHG

## 2019-12-14 VITALS — SYSTOLIC BLOOD PRESSURE: 101 MMHG | DIASTOLIC BLOOD PRESSURE: 60 MMHG

## 2019-12-14 VITALS — SYSTOLIC BLOOD PRESSURE: 105 MMHG | DIASTOLIC BLOOD PRESSURE: 61 MMHG

## 2019-12-14 VITALS — SYSTOLIC BLOOD PRESSURE: 101 MMHG | DIASTOLIC BLOOD PRESSURE: 62 MMHG

## 2019-12-14 VITALS — DIASTOLIC BLOOD PRESSURE: 63 MMHG | SYSTOLIC BLOOD PRESSURE: 114 MMHG

## 2019-12-14 LAB
ALBUMIN SERPL BCP-MCNC: 3.6 G/DL (ref 3.4–5)
ALBUMIN SERPL BCP-MCNC: 3.7 G/DL (ref 3.4–5)
ALBUMIN SERPL BCP-MCNC: 3.8 G/DL (ref 3.4–5)
ALBUMIN SERPL BCP-MCNC: 3.8 G/DL (ref 3.4–5)
ALBUMIN/GLOB SERPL: 1.7 {RATIO} (ref 1.1–1.5)
ALP SERPL-CCNC: 165 IU/L (ref 46–116)
ALT SERPL W P-5'-P-CCNC: 230 U/L (ref 12–78)
ANION GAP SERPL CALCULATED.3IONS-SCNC: 5 MMOL/L (ref 8–16)
ANION GAP SERPL CALCULATED.3IONS-SCNC: 7 MMOL/L (ref 8–16)
ANISOCYTOSIS BLD QL SMEAR: (no result)
APTT PPP: 30 SECONDS (ref 22–32)
AST SERPL W P-5'-P-CCNC: 156 U/L (ref 10–37)
BASOPHILS # BLD AUTO: 0 X10'3 (ref 0–0.2)
BASOPHILS NFR BLD AUTO: 0 % (ref 0–1)
BASOPHILS NFR BLD AUTO: 0.1 % (ref 0–1)
BILIRUB SERPL-MCNC: 4.4 MG/DL (ref 0.1–1)
BUN SERPL-MCNC: 30 MG/DL (ref 7–18)
BUN SERPL-MCNC: 32 MG/DL (ref 7–18)
BUN SERPL-MCNC: 35 MG/DL (ref 7–18)
BUN SERPL-MCNC: 38 MG/DL (ref 7–18)
BUN/CREAT SERPL: 19.3 (ref 5.4–32)
BUN/CREAT SERPL: 19.6 (ref 5.4–32)
BUN/CREAT SERPL: 19.7 (ref 5.4–32)
BUN/CREAT SERPL: 20.4 (ref 5.4–32)
CALCIUM SERPL-MCNC: 8.3 MG/DL (ref 8.5–10.1)
CALCIUM SERPL-MCNC: 8.3 MG/DL (ref 8.5–10.1)
CALCIUM SERPL-MCNC: 8.6 MG/DL (ref 8.5–10.1)
CHLORIDE SERPL-SCNC: 104 MMOL/L (ref 99–107)
CO2 SERPL-SCNC: 27.9 MMOL/L (ref 24–32)
CO2 SERPL-SCNC: 28.6 MMOL/L (ref 24–32)
CO2 SERPL-SCNC: 29.3 MMOL/L (ref 24–32)
CO2 SERPL-SCNC: 29.4 MMOL/L (ref 24–32)
CREAT SERPL-MCNC: 1.52 MG/DL (ref 0.6–1.1)
CREAT SERPL-MCNC: 1.57 MG/DL (ref 0.6–1.1)
CREAT SERPL-MCNC: 1.81 MG/DL (ref 0.6–1.1)
CREAT SERPL-MCNC: 1.94 MG/DL (ref 0.6–1.1)
EOSINOPHIL # BLD AUTO: 0 X10'3 (ref 0–0.9)
EOSINOPHIL NFR BLD AUTO: 0 % (ref 0–6)
ERYTHROCYTE [DISTWIDTH] IN BLOOD BY AUTOMATED COUNT: 16.7 % (ref 11.5–14.5)
ERYTHROCYTE [DISTWIDTH] IN BLOOD BY AUTOMATED COUNT: 16.7 % (ref 11.5–14.5)
ERYTHROCYTE [DISTWIDTH] IN BLOOD BY AUTOMATED COUNT: 16.8 % (ref 11.5–14.5)
ERYTHROCYTE [DISTWIDTH] IN BLOOD BY AUTOMATED COUNT: 16.8 % (ref 11.5–14.5)
GFR SERPL CREATININE-BSD FRML MDRD: 34 ML/MIN
GFR SERPL CREATININE-BSD FRML MDRD: 37 ML/MIN
GFR SERPL CREATININE-BSD FRML MDRD: 44 ML/MIN
GFR SERPL CREATININE-BSD FRML MDRD: 45 ML/MIN
GLUCOSE SERPL-MCNC: 131 MG/DL (ref 70–104)
GLUCOSE SERPL-MCNC: 133 MG/DL (ref 70–104)
GLUCOSE SERPL-MCNC: 144 MG/DL (ref 70–104)
GLUCOSE SERPL-MCNC: 146 MG/DL (ref 70–104)
HCT VFR BLD AUTO: 24.3 % (ref 42–52)
HCT VFR BLD AUTO: 24.8 % (ref 42–52)
HCT VFR BLD AUTO: 24.8 % (ref 42–52)
HCT VFR BLD AUTO: 25.4 % (ref 42–52)
HGB BLD-MCNC: 8.1 G/DL (ref 14–17.9)
HGB BLD-MCNC: 8.2 G/DL (ref 14–17.9)
HGB BLD-MCNC: 8.3 G/DL (ref 14–17.9)
HGB BLD-MCNC: 8.4 G/DL (ref 14–17.9)
LYMPHOCYTES # BLD AUTO: 0.6 X10'3 (ref 1.1–4.8)
LYMPHOCYTES # BLD AUTO: 1 X10'3 (ref 1.1–4.8)
LYMPHOCYTES # BLD AUTO: 1 X10'3 (ref 1.1–4.8)
LYMPHOCYTES # BLD AUTO: 1.1 X10'3 (ref 1.1–4.8)
LYMPHOCYTES NFR BLD AUTO: 2 % (ref 21–51)
LYMPHOCYTES NFR BLD AUTO: 3.4 % (ref 21–51)
LYMPHOCYTES NFR BLD AUTO: 3.6 % (ref 21–51)
LYMPHOCYTES NFR BLD AUTO: 3.9 % (ref 21–51)
LYMPHOCYTES NFR BLD MANUAL: 3 % (ref 21–51)
MAGNESIUM SERPL-MCNC: 2 MG/DL (ref 1.5–2.4)
MAGNESIUM SERPL-MCNC: 2.1 MG/DL (ref 1.5–2.4)
MCH RBC QN AUTO: 30.9 PG (ref 27–31)
MCH RBC QN AUTO: 31 PG (ref 27–31)
MCH RBC QN AUTO: 31.1 PG (ref 27–31)
MCH RBC QN AUTO: 31.1 PG (ref 27–31)
MCHC RBC AUTO-ENTMCNC: 33 G/DL (ref 33–36.5)
MCHC RBC AUTO-ENTMCNC: 33.2 G/DL (ref 33–36.5)
MCHC RBC AUTO-ENTMCNC: 33.4 G/DL (ref 33–36.5)
MCHC RBC AUTO-ENTMCNC: 33.5 G/DL (ref 33–36.5)
MCV RBC AUTO: 92.8 FL (ref 78–98)
MCV RBC AUTO: 93 FL (ref 78–98)
MCV RBC AUTO: 93.3 FL (ref 78–98)
MCV RBC AUTO: 93.7 FL (ref 78–98)
METAMYELOCYTES NFR BLD MANUAL: 1 % (ref 0–0)
MONOCYTES # BLD AUTO: 1.7 X10'3 (ref 0–0.9)
MONOCYTES # BLD AUTO: 1.9 X10'3 (ref 0–0.9)
MONOCYTES # BLD AUTO: 2 X10'3 (ref 0–0.9)
MONOCYTES # BLD AUTO: 2 X10'3 (ref 0–0.9)
MONOCYTES NFR BLD AUTO: 6.2 % (ref 2–12)
MONOCYTES NFR BLD AUTO: 6.5 % (ref 2–12)
MONOCYTES NFR BLD AUTO: 6.6 % (ref 2–12)
MONOCYTES NFR BLD AUTO: 6.8 % (ref 2–12)
MONOCYTES NFR BLD MANUAL: 2 % (ref 2–12)
NEUTROPHILS # BLD AUTO: 25.1 X10'3 (ref 1.8–7.7)
NEUTROPHILS # BLD AUTO: 25.3 X10'3 (ref 1.8–7.7)
NEUTROPHILS # BLD AUTO: 25.9 X10'3 (ref 1.8–7.7)
NEUTROPHILS # BLD AUTO: 27.8 X10'3 (ref 1.8–7.7)
NEUTROPHILS NFR BLD AUTO: 89.6 % (ref 42–75)
NEUTROPHILS NFR BLD AUTO: 89.7 % (ref 42–75)
NEUTROPHILS NFR BLD AUTO: 90.1 % (ref 42–75)
NEUTROPHILS NFR BLD AUTO: 91.3 % (ref 42–75)
NEUTS BAND # BLD MANUAL: 5 % (ref 0–10)
NEUTS SEG NFR BLD MANUAL: 89 % (ref 42–75)
NRBC BLD MANUAL-RTO: 5 /100WBC (ref 0–0)
PHOSPHATE SERPL-MCNC: 2.7 MG/DL (ref 2.3–4.5)
PHOSPHATE SERPL-MCNC: 3 MG/DL (ref 2.3–4.5)
PHOSPHATE SERPL-MCNC: 3.1 MG/DL (ref 2.3–4.5)
PHOSPHATE SERPL-MCNC: 3.5 MG/DL (ref 2.3–4.5)
PLATELET # BLD AUTO: 116 X10'3 (ref 140–440)
PLATELET # BLD AUTO: 122 X10'3 (ref 140–440)
PLATELET # BLD AUTO: 122 X10'3 (ref 140–440)
PLATELET # BLD AUTO: 123 X10'3 (ref 140–440)
PLATELET BLD QL SMEAR: (no result)
PMV BLD AUTO: 8.1 FL (ref 7.4–10.4)
PMV BLD AUTO: 8.2 FL (ref 7.4–10.4)
POLYCHROMASIA BLD QL SMEAR: (no result)
POTASSIUM SERPL-SCNC: 4.6 MMOL/L (ref 3.5–5.1)
POTASSIUM SERPL-SCNC: 4.7 MMOL/L (ref 3.5–5.1)
POTASSIUM SERPL-SCNC: 4.8 MMOL/L (ref 3.5–5.1)
POTASSIUM SERPL-SCNC: 5 MMOL/L (ref 3.5–5.1)
PROT SERPL-MCNC: 6 G/DL (ref 6.4–8.2)
RBC # BLD AUTO: 2.61 X10'6 (ref 4.7–6.1)
RBC # BLD AUTO: 2.66 X10'6 (ref 4.7–6.1)
RBC # BLD AUTO: 2.68 X10'6 (ref 4.7–6.1)
RBC # BLD AUTO: 2.71 X10'6 (ref 4.7–6.1)
RBC MORPH BLD: (no result)
SODIUM SERPL-SCNC: 138 MMOL/L (ref 135–145)
SODIUM SERPL-SCNC: 139 MMOL/L (ref 135–145)
TOTAL CELLS COUNTED FLD: 100
TOXIC GRANULES BLD QL SMEAR: (no result)
VANCOMYCIN SERPL-MCNC: 15.1 UG/ML (ref 6–14)
WBC # BLD AUTO: 27.8 X10'3 (ref 4.5–11)
WBC # BLD AUTO: 28.3 X10'3 (ref 4.5–11)
WBC # BLD AUTO: 28.8 X10'3 (ref 4.5–11)
WBC # BLD AUTO: 30.4 X10'3 (ref 4.5–11)

## 2019-12-14 PROCEDURE — CF141ZZ PLANAR NUCLEAR MEDICINE IMAGING OF GALLBLADDER USING TECHNETIUM 99M (TC-99M): ICD-10-PCS | Performed by: RADIOLOGY

## 2019-12-14 PROCEDURE — 5A1D90Z PERFORMANCE OF URINARY FILTRATION, CONTINUOUS, GREATER THAN 18 HOURS PER DAY: ICD-10-PCS | Performed by: INTERNAL MEDICINE

## 2019-12-14 RX ADMIN — METOCLOPRAMIDE SCH MG: 5 INJECTION, SOLUTION INTRAMUSCULAR; INTRAVENOUS at 13:53

## 2019-12-14 RX ADMIN — METHYLPREDNISOLONE SODIUM SUCCINATE SCH MG: 40 INJECTION, POWDER, FOR SOLUTION INTRAMUSCULAR; INTRAVENOUS at 20:28

## 2019-12-14 RX ADMIN — Medication SCH MLS/HR: at 04:26

## 2019-12-14 RX ADMIN — Medication SCH MLS/HR: at 05:26

## 2019-12-14 RX ADMIN — Medication SCH MLS/HR: at 09:26

## 2019-12-14 RX ADMIN — CEFEPIME HYDROCHLORIDE SCH MLS/HR: 1 INJECTION, SOLUTION INTRAVENOUS at 08:08

## 2019-12-14 RX ADMIN — Medication SCH MLS/HR: at 01:56

## 2019-12-14 RX ADMIN — IPRATROPIUM BROMIDE AND ALBUTEROL SULFATE SCH ML: .5; 3 SOLUTION RESPIRATORY (INHALATION) at 15:03

## 2019-12-14 RX ADMIN — IPRATROPIUM BROMIDE AND ALBUTEROL SULFATE SCH ML: .5; 3 SOLUTION RESPIRATORY (INHALATION) at 03:03

## 2019-12-14 RX ADMIN — Medication SCH MLS/HR: at 08:11

## 2019-12-14 RX ADMIN — Medication SCH MLS/HR: at 20:38

## 2019-12-14 RX ADMIN — Medication SCH MLS/HR: at 13:11

## 2019-12-14 RX ADMIN — METHYLPREDNISOLONE SODIUM SUCCINATE SCH MG: 40 INJECTION, POWDER, FOR SOLUTION INTRAMUSCULAR; INTRAVENOUS at 02:30

## 2019-12-14 RX ADMIN — Medication SCH MLS/HR: at 15:37

## 2019-12-14 RX ADMIN — METHYLPREDNISOLONE SODIUM SUCCINATE SCH MG: 40 INJECTION, POWDER, FOR SOLUTION INTRAMUSCULAR; INTRAVENOUS at 13:53

## 2019-12-14 RX ADMIN — MIDODRINE HYDROCHLORIDE SCH MG: 5 TABLET ORAL at 16:20

## 2019-12-14 RX ADMIN — METOCLOPRAMIDE SCH MG: 5 INJECTION, SOLUTION INTRAMUSCULAR; INTRAVENOUS at 02:31

## 2019-12-14 RX ADMIN — Medication SCH MLS/HR: at 15:40

## 2019-12-14 RX ADMIN — Medication SCH MLS/HR: at 10:41

## 2019-12-14 RX ADMIN — INSULIN HUMAN SCH MLS/HR: 100 INJECTION, SOLUTION PARENTERAL at 21:28

## 2019-12-14 RX ADMIN — Medication SCH MLS/HR: at 14:26

## 2019-12-14 RX ADMIN — MIDODRINE HYDROCHLORIDE SCH MG: 5 TABLET ORAL at 00:17

## 2019-12-14 RX ADMIN — METHYLNALTREXONE BROMIDE SCH MG: 12 INJECTION, SOLUTION SUBCUTANEOUS at 14:06

## 2019-12-14 RX ADMIN — Medication SCH MLS/HR: at 20:39

## 2019-12-14 RX ADMIN — IPRATROPIUM BROMIDE AND ALBUTEROL SULFATE SCH ML: .5; 3 SOLUTION RESPIRATORY (INHALATION) at 19:18

## 2019-12-14 RX ADMIN — Medication SCH MLS/HR: at 12:57

## 2019-12-14 RX ADMIN — DOCUSATE SODIUM SCH MG: 50 LIQUID ORAL at 08:00

## 2019-12-14 RX ADMIN — Medication SCH MLS/HR: at 15:39

## 2019-12-14 RX ADMIN — Medication SCH MMU: at 20:00

## 2019-12-14 RX ADMIN — Medication SCH MLS/HR: at 20:40

## 2019-12-14 RX ADMIN — VANCOMYCIN HYDROCHLORIDE SCH MLS/HR: 1.25 INJECTION, POWDER, LYOPHILIZED, FOR SOLUTION INTRAVENOUS at 13:53

## 2019-12-14 RX ADMIN — IPRATROPIUM BROMIDE AND ALBUTEROL SULFATE SCH ML: .5; 3 SOLUTION RESPIRATORY (INHALATION) at 11:00

## 2019-12-14 RX ADMIN — DOCUSATE SODIUM SCH MG: 100 CAPSULE, LIQUID FILLED ORAL at 20:00

## 2019-12-14 RX ADMIN — MIDODRINE HYDROCHLORIDE SCH MG: 5 TABLET ORAL at 08:12

## 2019-12-14 RX ADMIN — METHYLPREDNISOLONE SODIUM SUCCINATE SCH MG: 40 INJECTION, POWDER, FOR SOLUTION INTRAMUSCULAR; INTRAVENOUS at 08:16

## 2019-12-14 RX ADMIN — Medication SCH MLS/HR: at 03:11

## 2019-12-14 RX ADMIN — Medication SCH MMU: at 08:12

## 2019-12-14 RX ADMIN — Medication SCH MLS/HR: at 00:41

## 2019-12-14 RX ADMIN — FLUCONAZOLE IN SODIUM CHLORIDE SCH MLS/HR: 2 INJECTION, SOLUTION INTRAVENOUS at 18:12

## 2019-12-14 RX ADMIN — METOCLOPRAMIDE SCH MG: 5 INJECTION, SOLUTION INTRAMUSCULAR; INTRAVENOUS at 08:16

## 2019-12-14 RX ADMIN — Medication SCH MLS/HR: at 02:30

## 2019-12-14 RX ADMIN — Medication SCH MLS/HR: at 06:56

## 2019-12-14 RX ADMIN — IPRATROPIUM BROMIDE AND ALBUTEROL SULFATE SCH ML: .5; 3 SOLUTION RESPIRATORY (INHALATION) at 23:08

## 2019-12-14 RX ADMIN — SODIUM CHLORIDE SCH MLS/HR: 9 INJECTION INTRAMUSCULAR; INTRAVENOUS; SUBCUTANEOUS at 16:49

## 2019-12-14 RX ADMIN — Medication SCH MLS/HR: at 15:41

## 2019-12-14 RX ADMIN — HEPARIN SODIUM SCH UNIT: 5000 INJECTION, SOLUTION INTRAVENOUS; SUBCUTANEOUS at 08:18

## 2019-12-14 RX ADMIN — Medication SCH MLS/HR: at 11:56

## 2019-12-14 RX ADMIN — INSULIN HUMAN SCH MLS/HR: 100 INJECTION, SOLUTION PARENTERAL at 09:53

## 2019-12-14 RX ADMIN — METOCLOPRAMIDE SCH MG: 5 INJECTION, SOLUTION INTRAMUSCULAR; INTRAVENOUS at 20:28

## 2019-12-14 RX ADMIN — HEPARIN SODIUM SCH UNIT: 5000 INJECTION, SOLUTION INTRAVENOUS; SUBCUTANEOUS at 20:28

## 2019-12-14 RX ADMIN — IPRATROPIUM BROMIDE AND ALBUTEROL SULFATE SCH ML: .5; 3 SOLUTION RESPIRATORY (INHALATION) at 07:27

## 2019-12-14 NOTE — NUR
Patient in room ICU 2039. I have received report from Binu GONZALEZ and had the opportunity to 
ask questions and assume patient care. Patient resting in bed, alert and orientated x4, on 
room air with spo2 at 98%, Norepinephrine drip infusing via central line to keep MAP greater 
than 60, Insulin drip for blood glucose control, will titrate per protocol, see IV 
spreadsheet for further information, call light in reach, patient in view of RN, CVVH 
running via right subclavian TDC. Patient has refused to eat dinner at this time, however 
did drink approximally 25ml of nectar thick milk. All monitoring alarms audible. See 
interventions for further information. Will continue to monitor.

## 2019-12-14 NOTE — NUR
Positive Yeast culture growing in urine; identification/sensitivity to follow; Davina TARIQ notified. WBC continues to be elevated; intensivist aware.

## 2019-12-14 NOTE — NUR
Problems reprioritized. Patient report given, questions answered & plan of care reviewed 
with An GONZALEZ.

## 2019-12-14 NOTE — NUR
Telephone order for Fluconazole IV 400mg daily for yeast in the urine; however, patient on 
CVVH with decreased creatinine clearance; Davina TARIQ aware; orders to decrease dose by 
50% and administer 200mg daily.

## 2019-12-14 NOTE — NUR
Patient refused Docusate and Culturelle medications. Patient is also refusing to have heels 
floated. Patient was educated on risks vs benefits.

## 2019-12-14 NOTE — NUR
Problems reprioritized. Patient report given, questions answered & plan of care reviewed 
with Binu GONZALEZ.

## 2019-12-15 VITALS — DIASTOLIC BLOOD PRESSURE: 58 MMHG | SYSTOLIC BLOOD PRESSURE: 99 MMHG

## 2019-12-15 VITALS — DIASTOLIC BLOOD PRESSURE: 65 MMHG | SYSTOLIC BLOOD PRESSURE: 104 MMHG

## 2019-12-15 VITALS — SYSTOLIC BLOOD PRESSURE: 109 MMHG | DIASTOLIC BLOOD PRESSURE: 58 MMHG

## 2019-12-15 VITALS — DIASTOLIC BLOOD PRESSURE: 66 MMHG | SYSTOLIC BLOOD PRESSURE: 98 MMHG

## 2019-12-15 VITALS — DIASTOLIC BLOOD PRESSURE: 59 MMHG | SYSTOLIC BLOOD PRESSURE: 114 MMHG

## 2019-12-15 VITALS — SYSTOLIC BLOOD PRESSURE: 103 MMHG | DIASTOLIC BLOOD PRESSURE: 53 MMHG

## 2019-12-15 VITALS — DIASTOLIC BLOOD PRESSURE: 52 MMHG | SYSTOLIC BLOOD PRESSURE: 101 MMHG

## 2019-12-15 VITALS — DIASTOLIC BLOOD PRESSURE: 62 MMHG | SYSTOLIC BLOOD PRESSURE: 110 MMHG

## 2019-12-15 VITALS — SYSTOLIC BLOOD PRESSURE: 97 MMHG | DIASTOLIC BLOOD PRESSURE: 55 MMHG

## 2019-12-15 VITALS — SYSTOLIC BLOOD PRESSURE: 105 MMHG | DIASTOLIC BLOOD PRESSURE: 64 MMHG

## 2019-12-15 VITALS — DIASTOLIC BLOOD PRESSURE: 55 MMHG | SYSTOLIC BLOOD PRESSURE: 94 MMHG

## 2019-12-15 VITALS — SYSTOLIC BLOOD PRESSURE: 105 MMHG | DIASTOLIC BLOOD PRESSURE: 58 MMHG

## 2019-12-15 VITALS — DIASTOLIC BLOOD PRESSURE: 55 MMHG | SYSTOLIC BLOOD PRESSURE: 96 MMHG

## 2019-12-15 VITALS — DIASTOLIC BLOOD PRESSURE: 55 MMHG | SYSTOLIC BLOOD PRESSURE: 101 MMHG

## 2019-12-15 VITALS — SYSTOLIC BLOOD PRESSURE: 100 MMHG | DIASTOLIC BLOOD PRESSURE: 56 MMHG

## 2019-12-15 VITALS — SYSTOLIC BLOOD PRESSURE: 104 MMHG | DIASTOLIC BLOOD PRESSURE: 61 MMHG

## 2019-12-15 VITALS — SYSTOLIC BLOOD PRESSURE: 103 MMHG | DIASTOLIC BLOOD PRESSURE: 55 MMHG

## 2019-12-15 VITALS — SYSTOLIC BLOOD PRESSURE: 98 MMHG | DIASTOLIC BLOOD PRESSURE: 56 MMHG

## 2019-12-15 VITALS — SYSTOLIC BLOOD PRESSURE: 103 MMHG | DIASTOLIC BLOOD PRESSURE: 61 MMHG

## 2019-12-15 VITALS — SYSTOLIC BLOOD PRESSURE: 99 MMHG | DIASTOLIC BLOOD PRESSURE: 54 MMHG

## 2019-12-15 VITALS — DIASTOLIC BLOOD PRESSURE: 56 MMHG | SYSTOLIC BLOOD PRESSURE: 101 MMHG

## 2019-12-15 LAB
ALBUMIN SERPL BCP-MCNC: 3.6 G/DL (ref 3.4–5)
ALBUMIN/GLOB SERPL: 1.5 {RATIO} (ref 1.1–1.5)
ALP SERPL-CCNC: 146 IU/L (ref 46–116)
ALT SERPL W P-5'-P-CCNC: 185 U/L (ref 12–78)
ANION GAP SERPL CALCULATED.3IONS-SCNC: 8 MMOL/L (ref 8–16)
ANISOCYTOSIS BLD QL SMEAR: (no result)
APTT PPP: 32 SECONDS (ref 22–32)
AST SERPL W P-5'-P-CCNC: 109 U/L (ref 10–37)
BASOPHILS # BLD AUTO: 0 X10'3 (ref 0–0.2)
BASOPHILS NFR BLD AUTO: 0.2 % (ref 0–1)
BILIRUB SERPL-MCNC: 4.3 MG/DL (ref 0.1–1)
BUN SERPL-MCNC: 30 MG/DL (ref 7–18)
BUN/CREAT SERPL: 18.6 (ref 5.4–32)
CALCIUM SERPL-MCNC: 8.4 MG/DL (ref 8.5–10.1)
CHLORIDE SERPL-SCNC: 104 MMOL/L (ref 99–107)
CO2 SERPL-SCNC: 27.5 MMOL/L (ref 24–32)
CREAT SERPL-MCNC: 1.61 MG/DL (ref 0.6–1.1)
EOSINOPHIL # BLD AUTO: 0 X10'3 (ref 0–0.9)
EOSINOPHIL NFR BLD AUTO: 0 % (ref 0–6)
ERYTHROCYTE [DISTWIDTH] IN BLOOD BY AUTOMATED COUNT: 17.2 % (ref 11.5–14.5)
GFR SERPL CREATININE-BSD FRML MDRD: 42 ML/MIN
GLUCOSE SERPL-MCNC: 142 MG/DL (ref 70–104)
HCT VFR BLD AUTO: 24.7 % (ref 42–52)
HGB BLD-MCNC: 8.1 G/DL (ref 14–17.9)
LYMPHOCYTES # BLD AUTO: 0.6 X10'3 (ref 1.1–4.8)
LYMPHOCYTES NFR BLD AUTO: 2.2 % (ref 21–51)
LYMPHOCYTES NFR BLD MANUAL: 2 % (ref 21–51)
MAGNESIUM SERPL-MCNC: 1.9 MG/DL (ref 1.5–2.4)
MCH RBC QN AUTO: 30.9 PG (ref 27–31)
MCHC RBC AUTO-ENTMCNC: 32.8 G/DL (ref 33–36.5)
MCV RBC AUTO: 94.2 FL (ref 78–98)
MONOCYTES # BLD AUTO: 1.4 X10'3 (ref 0–0.9)
MONOCYTES NFR BLD AUTO: 5.1 % (ref 2–12)
MONOCYTES NFR BLD MANUAL: 3 % (ref 2–12)
NEUTROPHILS # BLD AUTO: 25.3 X10'3 (ref 1.8–7.7)
NEUTROPHILS NFR BLD AUTO: 92.5 % (ref 42–75)
NEUTS SEG NFR BLD MANUAL: 95 % (ref 42–75)
NRBC BLD MANUAL-RTO: 1 /100WBC (ref 0–0)
PHOSPHATE SERPL-MCNC: 2.9 MG/DL (ref 2.3–4.5)
PLATELET # BLD AUTO: 100 X10'3 (ref 140–440)
PLATELET BLD QL SMEAR: (no result)
PMV BLD AUTO: 8.1 FL (ref 7.4–10.4)
POTASSIUM SERPL-SCNC: 5.1 MMOL/L (ref 3.5–5.1)
PROT SERPL-MCNC: 6 G/DL (ref 6.4–8.2)
RBC # BLD AUTO: 2.63 X10'6 (ref 4.7–6.1)
RBC MORPH BLD: (no result)
SODIUM SERPL-SCNC: 139 MMOL/L (ref 135–145)
TOTAL CELLS COUNTED FLD: 100
TOXIC GRANULES BLD QL SMEAR: (no result)
WBC # BLD AUTO: 27.3 X10'3 (ref 4.5–11)

## 2019-12-15 RX ADMIN — METOCLOPRAMIDE SCH MG: 5 INJECTION, SOLUTION INTRAMUSCULAR; INTRAVENOUS at 08:35

## 2019-12-15 RX ADMIN — METHYLPREDNISOLONE SODIUM SUCCINATE SCH MG: 40 INJECTION, POWDER, FOR SOLUTION INTRAMUSCULAR; INTRAVENOUS at 02:10

## 2019-12-15 RX ADMIN — IPRATROPIUM BROMIDE AND ALBUTEROL SULFATE SCH ML: .5; 3 SOLUTION RESPIRATORY (INHALATION) at 19:16

## 2019-12-15 RX ADMIN — INSULIN HUMAN SCH MLS/HR: 100 INJECTION, SOLUTION PARENTERAL at 18:13

## 2019-12-15 RX ADMIN — Medication SCH MMU: at 20:00

## 2019-12-15 RX ADMIN — METHYLPREDNISOLONE SODIUM SUCCINATE SCH MG: 40 INJECTION, POWDER, FOR SOLUTION INTRAMUSCULAR; INTRAVENOUS at 19:59

## 2019-12-15 RX ADMIN — MIDODRINE HYDROCHLORIDE SCH MG: 5 TABLET ORAL at 00:11

## 2019-12-15 RX ADMIN — Medication SCH MMU: at 08:35

## 2019-12-15 RX ADMIN — INSULIN GLARGINE SCH UNIT: 100 INJECTION, SOLUTION SUBCUTANEOUS at 21:23

## 2019-12-15 RX ADMIN — SODIUM CHLORIDE SCH MLS/HR: 0.9 INJECTION, SOLUTION INTRAVENOUS at 06:15

## 2019-12-15 RX ADMIN — IPRATROPIUM BROMIDE AND ALBUTEROL SULFATE SCH ML: .5; 3 SOLUTION RESPIRATORY (INHALATION) at 11:07

## 2019-12-15 RX ADMIN — METOCLOPRAMIDE SCH MG: 5 INJECTION, SOLUTION INTRAMUSCULAR; INTRAVENOUS at 02:10

## 2019-12-15 RX ADMIN — VANCOMYCIN HYDROCHLORIDE SCH MLS/HR: 1.25 INJECTION, POWDER, LYOPHILIZED, FOR SOLUTION INTRAVENOUS at 12:26

## 2019-12-15 RX ADMIN — Medication SCH MLS/HR: at 01:09

## 2019-12-15 RX ADMIN — DOCUSATE SODIUM SCH MG: 100 CAPSULE, LIQUID FILLED ORAL at 20:00

## 2019-12-15 RX ADMIN — SODIUM CHLORIDE SCH MLS/HR: 0.9 INJECTION, SOLUTION INTRAVENOUS at 06:16

## 2019-12-15 RX ADMIN — DOCUSATE SODIUM SCH MG: 100 CAPSULE, LIQUID FILLED ORAL at 08:35

## 2019-12-15 RX ADMIN — Medication SCH MLS/HR: at 01:07

## 2019-12-15 RX ADMIN — IPRATROPIUM BROMIDE AND ALBUTEROL SULFATE SCH ML: .5; 3 SOLUTION RESPIRATORY (INHALATION) at 23:47

## 2019-12-15 RX ADMIN — Medication SCH MLS/HR: at 18:14

## 2019-12-15 RX ADMIN — Medication SCH MLS/HR: at 01:08

## 2019-12-15 RX ADMIN — IPRATROPIUM BROMIDE AND ALBUTEROL SULFATE SCH ML: .5; 3 SOLUTION RESPIRATORY (INHALATION) at 15:06

## 2019-12-15 RX ADMIN — MIDODRINE HYDROCHLORIDE SCH MG: 5 TABLET ORAL at 08:35

## 2019-12-15 RX ADMIN — INSULIN HUMAN SCH MLS/HR: 100 INJECTION, SOLUTION PARENTERAL at 14:53

## 2019-12-15 RX ADMIN — METHYLPREDNISOLONE SODIUM SUCCINATE SCH MG: 40 INJECTION, POWDER, FOR SOLUTION INTRAMUSCULAR; INTRAVENOUS at 14:13

## 2019-12-15 RX ADMIN — Medication SCH MLS/HR: at 00:14

## 2019-12-15 RX ADMIN — CEFEPIME HYDROCHLORIDE SCH MLS/HR: 1 INJECTION, SOLUTION INTRAVENOUS at 08:34

## 2019-12-15 RX ADMIN — METOCLOPRAMIDE SCH MG: 5 INJECTION, SOLUTION INTRAMUSCULAR; INTRAVENOUS at 19:59

## 2019-12-15 RX ADMIN — METOCLOPRAMIDE SCH MG: 5 INJECTION, SOLUTION INTRAMUSCULAR; INTRAVENOUS at 14:13

## 2019-12-15 RX ADMIN — HEPARIN SODIUM SCH UNIT: 5000 INJECTION, SOLUTION INTRAVENOUS; SUBCUTANEOUS at 20:00

## 2019-12-15 RX ADMIN — IPRATROPIUM BROMIDE AND ALBUTEROL SULFATE SCH ML: .5; 3 SOLUTION RESPIRATORY (INHALATION) at 03:00

## 2019-12-15 RX ADMIN — METHYLPREDNISOLONE SODIUM SUCCINATE SCH MG: 40 INJECTION, POWDER, FOR SOLUTION INTRAMUSCULAR; INTRAVENOUS at 08:35

## 2019-12-15 RX ADMIN — FLUCONAZOLE IN SODIUM CHLORIDE SCH MLS/HR: 2 INJECTION, SOLUTION INTRAVENOUS at 08:34

## 2019-12-15 RX ADMIN — IPRATROPIUM BROMIDE AND ALBUTEROL SULFATE SCH ML: .5; 3 SOLUTION RESPIRATORY (INHALATION) at 07:10

## 2019-12-15 RX ADMIN — MIDODRINE HYDROCHLORIDE SCH MG: 5 TABLET ORAL at 15:54

## 2019-12-15 NOTE — NUR
Reassessment: Pt NG has been pulled currently on pureed/NTL diet per SP 

recs and refused breakfast this AM. Not meeting needs on CVVH. LBM 12/14. 

Will need alternative nutrition if low PO persists s/p extubation.

Recommend:

1. Continue pureed/NTL diet per SP recs

2. IF NGTF; Vital High Protein at 75 ml/hr to provide total volume of

1800 ml, 1800 cals, 158g protein and 1512ml water. Initiate at 20ml/hr and

advance 20ml Q8 to goal as tolerated.

3. IF NGTF prealbumin q Monday and Thursday, daily weights

4. IF NGTF additional water flush per intensivist on CVVH

5. routine bowel care

6. When extubated and alert needs written and verbal DM education, A1c is

11.6

-------------------------------------------------------------------------------

Addendum: 12/15/19 at 1224 by Mal Greene RD

-------------------------------------------------------------------------------

Amended: Links added.

## 2019-12-15 NOTE — NUR
Problems reprioritized. Patient report given, questions answered & plan of care reviewed 
with Kiara GONZALEZ.

## 2019-12-16 VITALS — DIASTOLIC BLOOD PRESSURE: 64 MMHG | SYSTOLIC BLOOD PRESSURE: 113 MMHG

## 2019-12-16 VITALS — SYSTOLIC BLOOD PRESSURE: 97 MMHG | DIASTOLIC BLOOD PRESSURE: 51 MMHG

## 2019-12-16 VITALS — SYSTOLIC BLOOD PRESSURE: 106 MMHG | DIASTOLIC BLOOD PRESSURE: 59 MMHG

## 2019-12-16 VITALS — DIASTOLIC BLOOD PRESSURE: 55 MMHG | SYSTOLIC BLOOD PRESSURE: 97 MMHG

## 2019-12-16 VITALS — DIASTOLIC BLOOD PRESSURE: 58 MMHG | SYSTOLIC BLOOD PRESSURE: 107 MMHG

## 2019-12-16 VITALS — DIASTOLIC BLOOD PRESSURE: 58 MMHG | SYSTOLIC BLOOD PRESSURE: 102 MMHG

## 2019-12-16 VITALS — DIASTOLIC BLOOD PRESSURE: 60 MMHG | SYSTOLIC BLOOD PRESSURE: 98 MMHG

## 2019-12-16 VITALS — DIASTOLIC BLOOD PRESSURE: 62 MMHG | SYSTOLIC BLOOD PRESSURE: 111 MMHG

## 2019-12-16 VITALS — DIASTOLIC BLOOD PRESSURE: 51 MMHG | SYSTOLIC BLOOD PRESSURE: 92 MMHG

## 2019-12-16 VITALS — SYSTOLIC BLOOD PRESSURE: 94 MMHG | DIASTOLIC BLOOD PRESSURE: 52 MMHG

## 2019-12-16 VITALS — DIASTOLIC BLOOD PRESSURE: 41 MMHG | SYSTOLIC BLOOD PRESSURE: 107 MMHG

## 2019-12-16 VITALS — DIASTOLIC BLOOD PRESSURE: 53 MMHG | SYSTOLIC BLOOD PRESSURE: 100 MMHG

## 2019-12-16 VITALS — DIASTOLIC BLOOD PRESSURE: 61 MMHG | SYSTOLIC BLOOD PRESSURE: 101 MMHG

## 2019-12-16 VITALS — DIASTOLIC BLOOD PRESSURE: 57 MMHG | SYSTOLIC BLOOD PRESSURE: 94 MMHG

## 2019-12-16 VITALS — SYSTOLIC BLOOD PRESSURE: 105 MMHG | DIASTOLIC BLOOD PRESSURE: 64 MMHG

## 2019-12-16 VITALS — DIASTOLIC BLOOD PRESSURE: 68 MMHG | SYSTOLIC BLOOD PRESSURE: 91 MMHG

## 2019-12-16 VITALS — SYSTOLIC BLOOD PRESSURE: 96 MMHG | DIASTOLIC BLOOD PRESSURE: 52 MMHG

## 2019-12-16 VITALS — SYSTOLIC BLOOD PRESSURE: 102 MMHG | DIASTOLIC BLOOD PRESSURE: 56 MMHG

## 2019-12-16 VITALS — DIASTOLIC BLOOD PRESSURE: 62 MMHG | SYSTOLIC BLOOD PRESSURE: 102 MMHG

## 2019-12-16 VITALS — SYSTOLIC BLOOD PRESSURE: 114 MMHG | DIASTOLIC BLOOD PRESSURE: 86 MMHG

## 2019-12-16 VITALS — DIASTOLIC BLOOD PRESSURE: 65 MMHG | SYSTOLIC BLOOD PRESSURE: 109 MMHG

## 2019-12-16 VITALS — SYSTOLIC BLOOD PRESSURE: 100 MMHG | DIASTOLIC BLOOD PRESSURE: 56 MMHG

## 2019-12-16 VITALS — DIASTOLIC BLOOD PRESSURE: 54 MMHG | SYSTOLIC BLOOD PRESSURE: 93 MMHG

## 2019-12-16 VITALS — SYSTOLIC BLOOD PRESSURE: 107 MMHG | DIASTOLIC BLOOD PRESSURE: 57 MMHG

## 2019-12-16 LAB
ALBUMIN SERPL BCP-MCNC: 3.5 G/DL (ref 3.4–5)
ALBUMIN/GLOB SERPL: 1.4 {RATIO} (ref 1.1–1.5)
ALP SERPL-CCNC: 136 IU/L (ref 46–116)
ALT SERPL W P-5'-P-CCNC: 183 U/L (ref 12–78)
ANION GAP SERPL CALCULATED.3IONS-SCNC: 11 MMOL/L (ref 8–16)
APTT PPP: 32 SECONDS (ref 22–32)
AST SERPL W P-5'-P-CCNC: 128 U/L (ref 10–37)
BASE EXCESS BLDA CALC-SCNC: -3.8 MMOL/L (ref -2–3)
BASOPHILS # BLD AUTO: 0 X10'3 (ref 0–0.2)
BASOPHILS NFR BLD AUTO: 0.1 % (ref 0–1)
BILIRUB SERPL-MCNC: 4.7 MG/DL (ref 0.1–1)
BODY TEMPERATURE: 36.9
BUN SERPL-MCNC: 59 MG/DL (ref 7–18)
BUN/CREAT SERPL: 20.2 (ref 5.4–32)
CALCIUM SERPL-MCNC: 8.3 MG/DL (ref 8.5–10.1)
CHLORIDE SERPL-SCNC: 103 MMOL/L (ref 99–107)
CO2 SERPL-SCNC: 22.3 MMOL/L (ref 24–32)
COHGB MFR BLDA: 0.7 % (ref 0.5–1.5)
CREAT SERPL-MCNC: 2.92 MG/DL (ref 0.6–1.1)
EOSINOPHIL # BLD AUTO: 0 X10'3 (ref 0–0.9)
EOSINOPHIL NFR BLD AUTO: 0 % (ref 0–6)
ERYTHROCYTE [DISTWIDTH] IN BLOOD BY AUTOMATED COUNT: 17.6 % (ref 11.5–14.5)
GFR SERPL CREATININE-BSD FRML MDRD: 21 ML/MIN
GLUCOSE SERPL-MCNC: 185 MG/DL (ref 70–104)
HCO3 BLDA-SCNC: 20.2 MMOL/L (ref 22–26)
HCT VFR BLD AUTO: 24.2 % (ref 42–52)
HGB BLD-MCNC: 8 G/DL (ref 14–17.9)
HGB BLDA-MCNC: 9 G/DL (ref 14–17.9)
LYMPHOCYTES # BLD AUTO: 0.3 X10'3 (ref 1.1–4.8)
LYMPHOCYTES NFR BLD AUTO: 1.4 % (ref 21–51)
MAGNESIUM SERPL-MCNC: 2.2 MG/DL (ref 1.5–2.4)
MCH RBC QN AUTO: 31.8 PG (ref 27–31)
MCHC RBC AUTO-ENTMCNC: 33 G/DL (ref 33–36.5)
MCV RBC AUTO: 96.5 FL (ref 78–98)
METHGB MFR BLDA: 0.2 % (ref 0.3–1.12)
MONOCYTES # BLD AUTO: 1.3 X10'3 (ref 0–0.9)
MONOCYTES NFR BLD AUTO: 5.5 % (ref 2–12)
NEUTROPHILS # BLD AUTO: 21.6 X10'3 (ref 1.8–7.7)
NEUTROPHILS NFR BLD AUTO: 93 % (ref 42–75)
O2/TOTAL GAS SETTING VFR VENT: 21 MMHG/%
OXYHGB MFR BLDA: 93.4 % (ref 94–100)
PCO2 TEMP ADJ BLDA: 32.3 MMHG (ref 35–45)
PH TEMP ADJ BLDA: 7.41 [PH] (ref 7.35–7.45)
PHOSPHATE SERPL-MCNC: 4.9 MG/DL (ref 2.3–4.5)
PLATELET # BLD AUTO: 73 X10'3 (ref 140–440)
PMV BLD AUTO: 8.5 FL (ref 7.4–10.4)
PO2 TEMP ADJ BLD: 75 MMHG (ref 83–108)
POTASSIUM SERPL-SCNC: 5.9 MMOL/L (ref 3.5–5.1)
PREALB SERPL-MCNC: 12.8 MG/DL (ref 19–36)
PROT SERPL-MCNC: 6 G/DL (ref 6.4–8.2)
RBC # BLD AUTO: 2.51 X10'6 (ref 4.7–6.1)
RESP RATE RESTING: 26 B/MIN
SAO2 % BLDA: 94.2 % (ref 95–98)
SODIUM SERPL-SCNC: 136 MMOL/L (ref 135–145)
WBC # BLD AUTO: 23.2 X10'3 (ref 4.5–11)

## 2019-12-16 RX ADMIN — HEPARIN SODIUM SCH UNIT: 5000 INJECTION, SOLUTION INTRAVENOUS; SUBCUTANEOUS at 20:37

## 2019-12-16 RX ADMIN — MIDODRINE HYDROCHLORIDE SCH MG: 5 TABLET ORAL at 23:40

## 2019-12-16 RX ADMIN — INSULIN GLARGINE SCH UNIT: 100 INJECTION, SOLUTION SUBCUTANEOUS at 20:37

## 2019-12-16 RX ADMIN — MIDODRINE HYDROCHLORIDE SCH MG: 5 TABLET ORAL at 16:22

## 2019-12-16 RX ADMIN — MIDODRINE HYDROCHLORIDE SCH MG: 5 TABLET ORAL at 00:02

## 2019-12-16 RX ADMIN — METHYLPREDNISOLONE SODIUM SUCCINATE SCH MG: 40 INJECTION, POWDER, FOR SOLUTION INTRAMUSCULAR; INTRAVENOUS at 02:16

## 2019-12-16 RX ADMIN — DOCUSATE SODIUM SCH MG: 100 CAPSULE, LIQUID FILLED ORAL at 20:00

## 2019-12-16 RX ADMIN — Medication SCH MMU: at 09:30

## 2019-12-16 RX ADMIN — MIDODRINE HYDROCHLORIDE SCH MG: 5 TABLET ORAL at 09:29

## 2019-12-16 RX ADMIN — METHYLNALTREXONE BROMIDE SCH MG: 12 INJECTION, SOLUTION SUBCUTANEOUS at 09:27

## 2019-12-16 RX ADMIN — Medication SCH MLS/HR: at 05:32

## 2019-12-16 RX ADMIN — HEPARIN SODIUM SCH UNIT: 5000 INJECTION, SOLUTION INTRAVENOUS; SUBCUTANEOUS at 08:00

## 2019-12-16 RX ADMIN — IPRATROPIUM BROMIDE AND ALBUTEROL SULFATE SCH ML: .5; 3 SOLUTION RESPIRATORY (INHALATION) at 10:45

## 2019-12-16 RX ADMIN — METOCLOPRAMIDE SCH MG: 5 INJECTION, SOLUTION INTRAMUSCULAR; INTRAVENOUS at 02:16

## 2019-12-16 RX ADMIN — DOCUSATE SODIUM SCH MG: 100 CAPSULE, LIQUID FILLED ORAL at 09:30

## 2019-12-16 RX ADMIN — IPRATROPIUM BROMIDE AND ALBUTEROL SULFATE SCH ML: .5; 3 SOLUTION RESPIRATORY (INHALATION) at 23:17

## 2019-12-16 RX ADMIN — IPRATROPIUM BROMIDE AND ALBUTEROL SULFATE SCH ML: .5; 3 SOLUTION RESPIRATORY (INHALATION) at 19:02

## 2019-12-16 RX ADMIN — FLUCONAZOLE IN SODIUM CHLORIDE SCH MLS/HR: 2 INJECTION, SOLUTION INTRAVENOUS at 09:27

## 2019-12-16 RX ADMIN — SODIUM CHLORIDE SCH MLS/HR: 9 INJECTION INTRAMUSCULAR; INTRAVENOUS; SUBCUTANEOUS at 16:24

## 2019-12-16 RX ADMIN — Medication SCH MLS/HR: at 19:44

## 2019-12-16 RX ADMIN — INSULIN HUMAN SCH UNIT: 100 INJECTION, SOLUTION PARENTERAL at 09:22

## 2019-12-16 RX ADMIN — METOCLOPRAMIDE SCH MG: 5 INJECTION, SOLUTION INTRAMUSCULAR; INTRAVENOUS at 20:23

## 2019-12-16 RX ADMIN — METOCLOPRAMIDE SCH MG: 5 INJECTION, SOLUTION INTRAMUSCULAR; INTRAVENOUS at 09:28

## 2019-12-16 RX ADMIN — METOCLOPRAMIDE SCH MG: 5 INJECTION, SOLUTION INTRAMUSCULAR; INTRAVENOUS at 14:44

## 2019-12-16 RX ADMIN — INSULIN HUMAN SCH UNIT: 100 INJECTION, SOLUTION PARENTERAL at 14:40

## 2019-12-16 RX ADMIN — Medication SCH MMU: at 20:24

## 2019-12-16 RX ADMIN — IPRATROPIUM BROMIDE AND ALBUTEROL SULFATE SCH ML: .5; 3 SOLUTION RESPIRATORY (INHALATION) at 07:50

## 2019-12-16 RX ADMIN — METHYLPREDNISOLONE SODIUM SUCCINATE SCH MG: 40 INJECTION, POWDER, FOR SOLUTION INTRAMUSCULAR; INTRAVENOUS at 20:23

## 2019-12-16 RX ADMIN — METHYLPREDNISOLONE SODIUM SUCCINATE SCH MG: 40 INJECTION, POWDER, FOR SOLUTION INTRAMUSCULAR; INTRAVENOUS at 09:28

## 2019-12-16 RX ADMIN — IPRATROPIUM BROMIDE AND ALBUTEROL SULFATE SCH ML: .5; 3 SOLUTION RESPIRATORY (INHALATION) at 14:31

## 2019-12-16 RX ADMIN — VANCOMYCIN HYDROCHLORIDE SCH MLS/HR: 1.25 INJECTION, POWDER, LYOPHILIZED, FOR SOLUTION INTRAVENOUS at 12:46

## 2019-12-16 RX ADMIN — CEFEPIME HYDROCHLORIDE SCH MLS/HR: 1 INJECTION, SOLUTION INTRAVENOUS at 09:27

## 2019-12-16 RX ADMIN — INSULIN HUMAN SCH UNIT: 100 INJECTION, SOLUTION PARENTERAL at 20:36

## 2019-12-16 RX ADMIN — IPRATROPIUM BROMIDE AND ALBUTEROL SULFATE SCH ML: .5; 3 SOLUTION RESPIRATORY (INHALATION) at 02:53

## 2019-12-16 RX ADMIN — INSULIN HUMAN SCH UNIT: 100 INJECTION, SOLUTION PARENTERAL at 03:03

## 2019-12-16 RX ADMIN — METHYLPREDNISOLONE SODIUM SUCCINATE SCH MG: 40 INJECTION, POWDER, FOR SOLUTION INTRAMUSCULAR; INTRAVENOUS at 14:44

## 2019-12-16 NOTE — NUR
Patient no longer alert and oriented x4. Patient is stating that he is at a hotel and wants 
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 
Labs and ABG pending.

-------------------------------------------------------------------------------

Addendum: 12/16/19 at 0235 by An Velasquez RN

-------------------------------------------------------------------------------

vital signs stable.

## 2019-12-16 NOTE — NUR
F/u: Pt refusing all meals/meals and care per RN today. Pending CM d/w pt regarding care and 
code status given prolonged hx non-compliance. To receive zyprexa IM today hopefully to help 
alexander per MD. Still not appropriate for DM ed at this time; ed deferred.

-------------------------------------------------------------------------------

Addendum: 12/16/19 at 1128 by Mal Greene RD

-------------------------------------------------------------------------------

Amended: Links added.

## 2019-12-16 NOTE — NUR
Patient increasingly somnolent, not able to cough up secretions, patient is course 
throughout upon auscultation. Notified NAN Comer Over NP, received orders to check Ammonia 
level, results pending.

## 2019-12-17 VITALS — SYSTOLIC BLOOD PRESSURE: 109 MMHG | DIASTOLIC BLOOD PRESSURE: 53 MMHG

## 2019-12-17 VITALS — SYSTOLIC BLOOD PRESSURE: 94 MMHG | DIASTOLIC BLOOD PRESSURE: 47 MMHG

## 2019-12-17 VITALS — DIASTOLIC BLOOD PRESSURE: 55 MMHG | SYSTOLIC BLOOD PRESSURE: 103 MMHG

## 2019-12-17 VITALS — DIASTOLIC BLOOD PRESSURE: 58 MMHG | SYSTOLIC BLOOD PRESSURE: 86 MMHG

## 2019-12-17 VITALS — DIASTOLIC BLOOD PRESSURE: 48 MMHG | SYSTOLIC BLOOD PRESSURE: 110 MMHG

## 2019-12-17 VITALS — DIASTOLIC BLOOD PRESSURE: 62 MMHG | SYSTOLIC BLOOD PRESSURE: 109 MMHG

## 2019-12-17 VITALS — SYSTOLIC BLOOD PRESSURE: 102 MMHG | DIASTOLIC BLOOD PRESSURE: 61 MMHG

## 2019-12-17 VITALS — SYSTOLIC BLOOD PRESSURE: 108 MMHG | DIASTOLIC BLOOD PRESSURE: 66 MMHG

## 2019-12-17 VITALS — SYSTOLIC BLOOD PRESSURE: 99 MMHG | DIASTOLIC BLOOD PRESSURE: 60 MMHG

## 2019-12-17 VITALS — SYSTOLIC BLOOD PRESSURE: 106 MMHG | DIASTOLIC BLOOD PRESSURE: 66 MMHG

## 2019-12-17 VITALS — SYSTOLIC BLOOD PRESSURE: 99 MMHG | DIASTOLIC BLOOD PRESSURE: 46 MMHG

## 2019-12-17 VITALS — DIASTOLIC BLOOD PRESSURE: 51 MMHG | SYSTOLIC BLOOD PRESSURE: 93 MMHG

## 2019-12-17 VITALS — SYSTOLIC BLOOD PRESSURE: 105 MMHG | DIASTOLIC BLOOD PRESSURE: 60 MMHG

## 2019-12-17 VITALS — SYSTOLIC BLOOD PRESSURE: 103 MMHG | DIASTOLIC BLOOD PRESSURE: 45 MMHG

## 2019-12-17 VITALS — DIASTOLIC BLOOD PRESSURE: 54 MMHG | SYSTOLIC BLOOD PRESSURE: 99 MMHG

## 2019-12-17 VITALS — SYSTOLIC BLOOD PRESSURE: 112 MMHG | DIASTOLIC BLOOD PRESSURE: 65 MMHG

## 2019-12-17 VITALS — DIASTOLIC BLOOD PRESSURE: 55 MMHG | SYSTOLIC BLOOD PRESSURE: 102 MMHG

## 2019-12-17 VITALS — SYSTOLIC BLOOD PRESSURE: 113 MMHG | DIASTOLIC BLOOD PRESSURE: 65 MMHG

## 2019-12-17 VITALS — DIASTOLIC BLOOD PRESSURE: 50 MMHG | SYSTOLIC BLOOD PRESSURE: 94 MMHG

## 2019-12-17 VITALS — SYSTOLIC BLOOD PRESSURE: 95 MMHG | DIASTOLIC BLOOD PRESSURE: 52 MMHG

## 2019-12-17 VITALS — DIASTOLIC BLOOD PRESSURE: 62 MMHG | SYSTOLIC BLOOD PRESSURE: 102 MMHG

## 2019-12-17 VITALS — DIASTOLIC BLOOD PRESSURE: 47 MMHG | SYSTOLIC BLOOD PRESSURE: 90 MMHG

## 2019-12-17 VITALS — DIASTOLIC BLOOD PRESSURE: 61 MMHG | SYSTOLIC BLOOD PRESSURE: 113 MMHG

## 2019-12-17 VITALS — SYSTOLIC BLOOD PRESSURE: 99 MMHG | DIASTOLIC BLOOD PRESSURE: 67 MMHG

## 2019-12-17 LAB
ALBUMIN SERPL BCP-MCNC: 3.5 G/DL (ref 3.4–5)
ALBUMIN/GLOB SERPL: 1.5 {RATIO} (ref 1.1–1.5)
ALP SERPL-CCNC: 118 IU/L (ref 46–116)
ALT SERPL W P-5'-P-CCNC: 244 U/L (ref 12–78)
ANION GAP SERPL CALCULATED.3IONS-SCNC: 12 MMOL/L (ref 8–16)
ANISOCYTOSIS BLD QL SMEAR: (no result)
APTT PPP: 35 SECONDS (ref 22–32)
AST SERPL W P-5'-P-CCNC: 211 U/L (ref 10–37)
BASE EXCESS BLDA CALC-SCNC: -0.6 MMOL/L (ref -2–3)
BASOPHILS # BLD AUTO: 0 X10'3 (ref 0–0.2)
BASOPHILS NFR BLD AUTO: 0.2 % (ref 0–1)
BILIRUB SERPL-MCNC: 5 MG/DL (ref 0.1–1)
BODY TEMPERATURE: 37
BUN SERPL-MCNC: 91 MG/DL (ref 7–18)
BUN/CREAT SERPL: 21.4 (ref 5.4–32)
BURR CELLS BLD QL SMEAR: (no result)
CALCIUM SERPL-MCNC: 7.5 MG/DL (ref 8.5–10.1)
CHLORIDE SERPL-SCNC: 101 MMOL/L (ref 99–107)
CO2 SERPL-SCNC: 22.4 MMOL/L (ref 24–32)
COHGB MFR BLDA: 0.5 % (ref 0.5–1.5)
CREAT SERPL-MCNC: 4.25 MG/DL (ref 0.6–1.1)
EOSINOPHIL # BLD AUTO: 0 X10'3 (ref 0–0.9)
EOSINOPHIL NFR BLD AUTO: 0 % (ref 0–6)
ERYTHROCYTE [DISTWIDTH] IN BLOOD BY AUTOMATED COUNT: 22.6 % (ref 11.5–14.5)
GFR SERPL CREATININE-BSD FRML MDRD: 14 ML/MIN
GLUCOSE SERPL-MCNC: 206 MG/DL (ref 70–104)
HCO3 BLDA-SCNC: 22.7 MMOL/L (ref 22–26)
HCT VFR BLD AUTO: 24.3 % (ref 42–52)
HGB BLD-MCNC: 7.9 G/DL (ref 14–17.9)
HGB BLDA-MCNC: 8.6 G/DL (ref 14–17.9)
INHALED O2 FLOW RATE: 2 L/MIN
LYMPHOCYTES # BLD AUTO: 0.3 X10'3 (ref 1.1–4.8)
LYMPHOCYTES NFR BLD AUTO: 1.4 % (ref 21–51)
MACROCYTES BLD QL SMEAR: (no result)
MAGNESIUM SERPL-MCNC: 2.4 MG/DL (ref 1.5–2.4)
MCH RBC QN AUTO: 31.4 PG (ref 27–31)
MCHC RBC AUTO-ENTMCNC: 32.5 G/DL (ref 33–36.5)
MCV RBC AUTO: 96.5 FL (ref 78–98)
METHGB MFR BLDA: 0.2 % (ref 0.3–1.12)
MONOCYTES # BLD AUTO: 0.7 X10'3 (ref 0–0.9)
MONOCYTES NFR BLD AUTO: 3.8 % (ref 2–12)
NEUTROPHILS # BLD AUTO: 17.2 X10'3 (ref 1.8–7.7)
NEUTROPHILS NFR BLD AUTO: 94.6 % (ref 42–75)
O2/TOTAL GAS SETTING VFR VENT: 28 MMHG/%
OXYHGB MFR BLDA: 97.5 % (ref 94–100)
PCO2 TEMP ADJ BLDA: 31.5 MMHG (ref 35–45)
PH TEMP ADJ BLDA: 7.47 [PH] (ref 7.35–7.45)
PHOSPHATE SERPL-MCNC: 6.7 MG/DL (ref 2.3–4.5)
PLATELET # BLD AUTO: 63 X10'3 (ref 140–440)
PLATELET BLD QL SMEAR: (no result)
PMV BLD AUTO: 9.2 FL (ref 7.4–10.4)
PO2 TEMP ADJ BLD: 117.2 MMHG (ref 83–108)
POTASSIUM SERPL-SCNC: 6.2 MMOL/L (ref 3.5–5.1)
PROT SERPL-MCNC: 5.9 G/DL (ref 6.4–8.2)
RBC # BLD AUTO: 2.52 X10'6 (ref 4.7–6.1)
RBC MORPH BLD: (no result)
SAO2 % BLDA: 98.2 % (ref 95–98)
SCHISTOCYTES BLD QL SMEAR: (no result)
SODIUM SERPL-SCNC: 135 MMOL/L (ref 135–145)
WBC # BLD AUTO: 18.2 X10'3 (ref 4.5–11)

## 2019-12-17 PROCEDURE — 5A1D70Z PERFORMANCE OF URINARY FILTRATION, INTERMITTENT, LESS THAN 6 HOURS PER DAY: ICD-10-PCS | Performed by: INTERNAL MEDICINE

## 2019-12-17 RX ADMIN — METHYLPREDNISOLONE SODIUM SUCCINATE SCH MG: 40 INJECTION, POWDER, FOR SOLUTION INTRAMUSCULAR; INTRAVENOUS at 08:23

## 2019-12-17 RX ADMIN — MIDODRINE HYDROCHLORIDE SCH MG: 5 TABLET ORAL at 17:10

## 2019-12-17 RX ADMIN — METOCLOPRAMIDE SCH MG: 5 INJECTION, SOLUTION INTRAMUSCULAR; INTRAVENOUS at 13:38

## 2019-12-17 RX ADMIN — METOCLOPRAMIDE SCH MG: 5 INJECTION, SOLUTION INTRAMUSCULAR; INTRAVENOUS at 08:24

## 2019-12-17 RX ADMIN — INSULIN GLARGINE SCH UNIT: 100 INJECTION, SOLUTION SUBCUTANEOUS at 21:00

## 2019-12-17 RX ADMIN — METHYLPREDNISOLONE SODIUM SUCCINATE SCH MG: 40 INJECTION, POWDER, FOR SOLUTION INTRAMUSCULAR; INTRAVENOUS at 02:10

## 2019-12-17 RX ADMIN — DOCUSATE SODIUM SCH MG: 100 CAPSULE, LIQUID FILLED ORAL at 20:12

## 2019-12-17 RX ADMIN — METHYLPREDNISOLONE SODIUM SUCCINATE SCH MG: 40 INJECTION, POWDER, FOR SOLUTION INTRAMUSCULAR; INTRAVENOUS at 13:38

## 2019-12-17 RX ADMIN — Medication SCH MMU: at 08:23

## 2019-12-17 RX ADMIN — IPRATROPIUM BROMIDE AND ALBUTEROL SULFATE SCH ML: .5; 3 SOLUTION RESPIRATORY (INHALATION) at 07:26

## 2019-12-17 RX ADMIN — HEPARIN SODIUM SCH UNIT: 5000 INJECTION, SOLUTION INTRAVENOUS; SUBCUTANEOUS at 08:00

## 2019-12-17 RX ADMIN — IPRATROPIUM BROMIDE AND ALBUTEROL SULFATE SCH ML: .5; 3 SOLUTION RESPIRATORY (INHALATION) at 15:26

## 2019-12-17 RX ADMIN — METHYLPREDNISOLONE SODIUM SUCCINATE SCH MG: 40 INJECTION, POWDER, FOR SOLUTION INTRAMUSCULAR; INTRAVENOUS at 20:12

## 2019-12-17 RX ADMIN — METOCLOPRAMIDE SCH MG: 5 INJECTION, SOLUTION INTRAMUSCULAR; INTRAVENOUS at 02:10

## 2019-12-17 RX ADMIN — MIDODRINE HYDROCHLORIDE SCH MG: 5 TABLET ORAL at 08:23

## 2019-12-17 RX ADMIN — METOCLOPRAMIDE SCH MG: 5 INJECTION, SOLUTION INTRAMUSCULAR; INTRAVENOUS at 20:12

## 2019-12-17 RX ADMIN — HEPARIN SODIUM SCH UNIT: 5000 INJECTION, SOLUTION INTRAVENOUS; SUBCUTANEOUS at 20:00

## 2019-12-17 RX ADMIN — IPRATROPIUM BROMIDE AND ALBUTEROL SULFATE SCH ML: .5; 3 SOLUTION RESPIRATORY (INHALATION) at 03:08

## 2019-12-17 RX ADMIN — IPRATROPIUM BROMIDE AND ALBUTEROL SULFATE SCH ML: .5; 3 SOLUTION RESPIRATORY (INHALATION) at 19:48

## 2019-12-17 RX ADMIN — IPRATROPIUM BROMIDE AND ALBUTEROL SULFATE SCH ML: .5; 3 SOLUTION RESPIRATORY (INHALATION) at 23:23

## 2019-12-17 RX ADMIN — Medication SCH MMU: at 20:12

## 2019-12-17 RX ADMIN — IPRATROPIUM BROMIDE AND ALBUTEROL SULFATE SCH ML: .5; 3 SOLUTION RESPIRATORY (INHALATION) at 10:56

## 2019-12-17 RX ADMIN — DOCUSATE SODIUM SCH MG: 100 CAPSULE, LIQUID FILLED ORAL at 08:25

## 2019-12-17 RX ADMIN — INSULIN HUMAN SCH UNIT: 100 INJECTION, SOLUTION PARENTERAL at 02:17

## 2019-12-17 NOTE — NUR
Problems reprioritized. Patient report given, questions answered & plan of care reviewed 
with Julissa GONZALEZ.

## 2019-12-17 NOTE — NUR
Patient in room ICU 2039. I have received report from Mykel GONZALEZ and had the opportunity to 
ask questions and assume patient care. Patient resting in bed, offers no complaints. Vitals 
WNL at this time, BP stable with no vasoactive medications infusing at this time. Will 
continue to monitor patient closely.

## 2019-12-17 NOTE — NUR
Problems reprioritized. Patient report given, questions answered & plan of care reviewed 
with Mykel GONZALEZ.

## 2019-12-17 NOTE — NUR
Patient in room ICU 2039. I have received report from  An GONZALEZ  and had the opportunity to 
ask questions and assume patient care.

## 2019-12-17 NOTE — NUR
Patient continues to ask for more water, educated patient on fluid overloading, including 
difficulty breathing patient stated "its my body". Asked patient if he stops breathing does 
he want to be put on a ventilator, patient shook head no and verbally stated "no". Asked 
patient if his heart stops does he want us do chest compressions and medications, again 
patient shook head no and verbally stated "no". Asked patient if he understood what Full 
code means, he stated "yes", when asked if he wanted to be a full code shook head no and 
verbally stated "No". At this time patient is alert and orientated to self, time, place and 
situation. Above conversation was informed to NAN Zapata NP.  consult 
regarding code status is ordered.

## 2019-12-18 VITALS — DIASTOLIC BLOOD PRESSURE: 74 MMHG | SYSTOLIC BLOOD PRESSURE: 107 MMHG

## 2019-12-18 VITALS — DIASTOLIC BLOOD PRESSURE: 51 MMHG | SYSTOLIC BLOOD PRESSURE: 89 MMHG

## 2019-12-18 VITALS — SYSTOLIC BLOOD PRESSURE: 112 MMHG | DIASTOLIC BLOOD PRESSURE: 58 MMHG

## 2019-12-18 VITALS — DIASTOLIC BLOOD PRESSURE: 62 MMHG | SYSTOLIC BLOOD PRESSURE: 108 MMHG

## 2019-12-18 VITALS — DIASTOLIC BLOOD PRESSURE: 56 MMHG | SYSTOLIC BLOOD PRESSURE: 99 MMHG

## 2019-12-18 VITALS — SYSTOLIC BLOOD PRESSURE: 115 MMHG | DIASTOLIC BLOOD PRESSURE: 68 MMHG

## 2019-12-18 VITALS — DIASTOLIC BLOOD PRESSURE: 54 MMHG | SYSTOLIC BLOOD PRESSURE: 91 MMHG

## 2019-12-18 VITALS — DIASTOLIC BLOOD PRESSURE: 59 MMHG | SYSTOLIC BLOOD PRESSURE: 123 MMHG

## 2019-12-18 VITALS — DIASTOLIC BLOOD PRESSURE: 90 MMHG | SYSTOLIC BLOOD PRESSURE: 154 MMHG

## 2019-12-18 VITALS — DIASTOLIC BLOOD PRESSURE: 83 MMHG | SYSTOLIC BLOOD PRESSURE: 112 MMHG

## 2019-12-18 VITALS — SYSTOLIC BLOOD PRESSURE: 95 MMHG | DIASTOLIC BLOOD PRESSURE: 49 MMHG

## 2019-12-18 VITALS — SYSTOLIC BLOOD PRESSURE: 111 MMHG | DIASTOLIC BLOOD PRESSURE: 62 MMHG

## 2019-12-18 VITALS — DIASTOLIC BLOOD PRESSURE: 70 MMHG | SYSTOLIC BLOOD PRESSURE: 110 MMHG

## 2019-12-18 VITALS — DIASTOLIC BLOOD PRESSURE: 58 MMHG | SYSTOLIC BLOOD PRESSURE: 100 MMHG

## 2019-12-18 VITALS — SYSTOLIC BLOOD PRESSURE: 97 MMHG | DIASTOLIC BLOOD PRESSURE: 62 MMHG

## 2019-12-18 VITALS — SYSTOLIC BLOOD PRESSURE: 107 MMHG | DIASTOLIC BLOOD PRESSURE: 56 MMHG

## 2019-12-18 VITALS — SYSTOLIC BLOOD PRESSURE: 116 MMHG | DIASTOLIC BLOOD PRESSURE: 72 MMHG

## 2019-12-18 LAB
ALBUMIN SERPL BCP-MCNC: 3.5 G/DL (ref 3.4–5)
ALBUMIN/GLOB SERPL: 1.5 {RATIO} (ref 1.1–1.5)
ALP SERPL-CCNC: 113 IU/L (ref 46–116)
ALT SERPL W P-5'-P-CCNC: 251 U/L (ref 12–78)
ANION GAP SERPL CALCULATED.3IONS-SCNC: 18 MMOL/L (ref 8–16)
ANISOCYTOSIS BLD QL SMEAR: (no result)
APTT PPP: 35 SECONDS (ref 22–32)
AST SERPL W P-5'-P-CCNC: 210 U/L (ref 10–37)
BASOPHILS # BLD AUTO: 0.1 X10'3 (ref 0–0.2)
BASOPHILS NFR BLD AUTO: 0.3 % (ref 0–1)
BILIRUB SERPL-MCNC: 6.6 MG/DL (ref 0.1–1)
BUN SERPL-MCNC: 67 MG/DL (ref 7–18)
BUN/CREAT SERPL: 19.3 (ref 5.4–32)
BURR CELLS BLD QL SMEAR: (no result)
CALCIUM SERPL-MCNC: 7.4 MG/DL (ref 8.5–10.1)
CHLORIDE SERPL-SCNC: 99 MMOL/L (ref 99–107)
CO2 SERPL-SCNC: 19.5 MMOL/L (ref 24–32)
CREAT SERPL-MCNC: 3.47 MG/DL (ref 0.6–1.1)
EOSINOPHIL # BLD AUTO: 0 X10'3 (ref 0–0.9)
EOSINOPHIL NFR BLD AUTO: 0 % (ref 0–6)
ERYTHROCYTE [DISTWIDTH] IN BLOOD BY AUTOMATED COUNT: 24.2 % (ref 11.5–14.5)
GFR SERPL CREATININE-BSD FRML MDRD: 17 ML/MIN
GLUCOSE SERPL-MCNC: 210 MG/DL (ref 70–104)
HCT VFR BLD AUTO: 23.5 % (ref 42–52)
HGB BLD-MCNC: 7.7 G/DL (ref 14–17.9)
LYMPHOCYTES # BLD AUTO: 0.2 X10'3 (ref 1.1–4.8)
LYMPHOCYTES NFR BLD AUTO: 1.2 % (ref 21–51)
MACROCYTES BLD QL SMEAR: (no result)
MAGNESIUM SERPL-MCNC: 2.3 MG/DL (ref 1.5–2.4)
MCH RBC QN AUTO: 31.6 PG (ref 27–31)
MCHC RBC AUTO-ENTMCNC: 32.9 G/DL (ref 33–36.5)
MCV RBC AUTO: 96.3 FL (ref 78–98)
MONOCYTES # BLD AUTO: 0.5 X10'3 (ref 0–0.9)
MONOCYTES NFR BLD AUTO: 3.4 % (ref 2–12)
NEUTROPHILS # BLD AUTO: 14.7 X10'3 (ref 1.8–7.7)
NEUTROPHILS NFR BLD AUTO: 95.1 % (ref 42–75)
PHOSPHATE SERPL-MCNC: 5.9 MG/DL (ref 2.3–4.5)
PLATELET # BLD AUTO: 56 X10'3 (ref 140–440)
PLATELET BLD QL SMEAR: (no result)
PMV BLD AUTO: 9.2 FL (ref 7.4–10.4)
POLYCHROMASIA BLD QL SMEAR: (no result)
POTASSIUM SERPL-SCNC: 5.3 MMOL/L (ref 3.5–5.1)
PROT SERPL-MCNC: 5.9 G/DL (ref 6.4–8.2)
RBC # BLD AUTO: 2.44 X10'6 (ref 4.7–6.1)
RBC MORPH BLD: (no result)
SCHISTOCYTES BLD QL SMEAR: (no result)
SODIUM SERPL-SCNC: 136 MMOL/L (ref 135–145)
WBC # BLD AUTO: 15.5 X10'3 (ref 4.5–11)

## 2019-12-18 RX ADMIN — METHYLPREDNISOLONE SODIUM SUCCINATE SCH MG: 40 INJECTION, POWDER, FOR SOLUTION INTRAMUSCULAR; INTRAVENOUS at 02:38

## 2019-12-18 RX ADMIN — METHYLPREDNISOLONE SODIUM SUCCINATE SCH MG: 40 INJECTION, POWDER, FOR SOLUTION INTRAMUSCULAR; INTRAVENOUS at 14:21

## 2019-12-18 RX ADMIN — MIDODRINE HYDROCHLORIDE SCH MG: 5 TABLET ORAL at 00:27

## 2019-12-18 RX ADMIN — METHYLPREDNISOLONE SODIUM SUCCINATE SCH MG: 40 INJECTION, POWDER, FOR SOLUTION INTRAMUSCULAR; INTRAVENOUS at 09:47

## 2019-12-18 RX ADMIN — METOCLOPRAMIDE SCH MG: 5 INJECTION, SOLUTION INTRAMUSCULAR; INTRAVENOUS at 14:21

## 2019-12-18 RX ADMIN — INSULIN LISPRO SCH UNITS: 100 INJECTION, SOLUTION INTRAVENOUS; SUBCUTANEOUS at 09:02

## 2019-12-18 RX ADMIN — MIDODRINE HYDROCHLORIDE SCH MG: 5 TABLET ORAL at 09:50

## 2019-12-18 RX ADMIN — METOCLOPRAMIDE SCH MG: 5 INJECTION, SOLUTION INTRAMUSCULAR; INTRAVENOUS at 02:37

## 2019-12-18 RX ADMIN — SODIUM CHLORIDE SCH MLS/HR: 9 INJECTION INTRAMUSCULAR; INTRAVENOUS; SUBCUTANEOUS at 16:15

## 2019-12-18 RX ADMIN — MIDODRINE HYDROCHLORIDE SCH MG: 5 TABLET ORAL at 15:04

## 2019-12-18 RX ADMIN — IPRATROPIUM BROMIDE AND ALBUTEROL SULFATE SCH ML: .5; 3 SOLUTION RESPIRATORY (INHALATION) at 11:22

## 2019-12-18 RX ADMIN — METOCLOPRAMIDE SCH MG: 5 INJECTION, SOLUTION INTRAMUSCULAR; INTRAVENOUS at 09:46

## 2019-12-18 RX ADMIN — HEPARIN SODIUM SCH UNIT: 5000 INJECTION, SOLUTION INTRAVENOUS; SUBCUTANEOUS at 08:00

## 2019-12-18 RX ADMIN — IPRATROPIUM BROMIDE AND ALBUTEROL SULFATE SCH ML: .5; 3 SOLUTION RESPIRATORY (INHALATION) at 15:00

## 2019-12-18 RX ADMIN — IPRATROPIUM BROMIDE AND ALBUTEROL SULFATE SCH ML: .5; 3 SOLUTION RESPIRATORY (INHALATION) at 08:21

## 2019-12-18 RX ADMIN — IPRATROPIUM BROMIDE AND ALBUTEROL SULFATE SCH ML: .5; 3 SOLUTION RESPIRATORY (INHALATION) at 03:00

## 2019-12-18 RX ADMIN — INSULIN LISPRO SCH UNITS: 100 INJECTION, SOLUTION INTRAVENOUS; SUBCUTANEOUS at 14:21

## 2019-12-18 RX ADMIN — Medication SCH MMU: at 09:49

## 2019-12-18 RX ADMIN — METHYLNALTREXONE BROMIDE SCH MG: 12 INJECTION, SOLUTION SUBCUTANEOUS at 08:00

## 2019-12-18 RX ADMIN — DOCUSATE SODIUM SCH MG: 100 CAPSULE, LIQUID FILLED ORAL at 09:49

## 2019-12-18 NOTE — NUR
Dr. Zawada visited pt at bedside, RN showed provider pt's bilateral feet (dusk color due to 
being on levo drip for prolonged period of time). No order received.

## 2019-12-18 NOTE — NUR
PRESSURE ULCER EDUCATION:



DEFINITION:  A pressure ulcer is an area of skin that breaks down when you stay in one 
position too long. The constant pressure against the skin reduces the blood flow to that 
area and the affected tissue dies.



CAUSES:

"Being bedridden or in a wheelchair

"Fragile skin

"Having a chronic condition, such as diabetes or vascular disease

"Inability to move certain parts of your body without assistance

"Older age

"Incontinence of urine or stool



SYMPTOMS:

"A reddened area that DOES NOT turn white when pressed on - this can be the beginning of a 
pressure ulcer

"A blister, deep sore or a crater - these can be advanced pressure ulcers



FIRST AID:

"Relieve the pressure on this area

"Keep the area clean and dry

"Call your primary doctor if you see any of the above symptoms

"DO NOT massage the area

"DO NOT use a donut shaped or ring shaped pillow- these actually interfere with the blood 
flow and cause complications



PREVENTION:

"Check for pressure ulcers everyday

"Change position at least every two hours to relieve pressure

"Use items that help relieve pressure- pillows, sheepskin, foam padding, and powders.

"Keep skin clean and dry

"Eat healthy well balanced meals

"Exercise daily



IF YOU SEE ANY OF THESE SYMPTOMS WHILE IN THE HOSPITAL - TELL YOUR NURSE IMMEDIATELY.



IF YOU SEE ANY OF THESE SYMPTOMS WHILE AT HOME OR HAVE ANY QUESTIONS OR CONCERNS  ABOUT 
PRESSURE ULCERS - CALL YOUR PRIMARY DOCTOR IMMEDIATELY.


-------------------------------------------------------------------------------

Addendum: 12/18/19 at 1546 by Jennifer Cox RN

-------------------------------------------------------------------------------

Amended: Links added.

## 2019-12-18 NOTE — NUR
Valuables and belongings are with patient in bed. Black wallet, glasses, dentures, watch, 
keys, clothes and a pair of boots.

## 2019-12-18 NOTE — NUR
Problems reprioritized. Patient report given, questions answered & plan of care reviewed 
with Kaci GONZALEZ.

## 2019-12-18 NOTE — NUR
Patient in room ICU 2039. I have received report from LISA Camarena   and had the 
opportunity to ask questions and assume patient care.

## 2019-12-29 NOTE — NUR
Late entry for dialysis CRRT for 12/12/19 added 12/29/19

-------------------------------------------------------------------------------

Addendum: 12/29/19 at 1352 by Shena Epstein RN

-------------------------------------------------------------------------------

Amended: Links added.

## 2019-12-29 NOTE — NUR
Duplicate charges 12/17 for dialysis. Please disregard second entry.

-------------------------------------------------------------------------------

Addendum: 12/29/19 at 1349 by Shena Epstein RN

-------------------------------------------------------------------------------

Amended: Links added.
